# Patient Record
Sex: FEMALE | Race: WHITE | URBAN - METROPOLITAN AREA
[De-identification: names, ages, dates, MRNs, and addresses within clinical notes are randomized per-mention and may not be internally consistent; named-entity substitution may affect disease eponyms.]

---

## 2021-01-14 ENCOUNTER — NEW PATIENT (OUTPATIENT)
Dept: URBAN - METROPOLITAN AREA CLINIC 51 | Facility: CLINIC | Age: 76
End: 2021-01-14
Payer: MEDICARE

## 2021-01-14 DIAGNOSIS — H43.313 VITREOUS MEMBRANES AND STRANDS, BILATERAL: ICD-10-CM

## 2021-01-14 DIAGNOSIS — H53.022 REFRACTIVE AMBLYOPIA, LEFT EYE: ICD-10-CM

## 2021-01-14 PROCEDURE — 92004 COMPRE OPH EXAM NEW PT 1/>: CPT | Performed by: OPTOMETRIST

## 2021-01-14 PROCEDURE — 92134 CPTRZ OPH DX IMG PST SGM RTA: CPT | Performed by: OPTOMETRIST

## 2021-01-14 ASSESSMENT — INTRAOCULAR PRESSURE
OS: 14
OD: 15

## 2021-01-14 ASSESSMENT — VISUAL ACUITY
OS: 20/HM
OD: 20/20

## 2021-01-14 ASSESSMENT — KERATOMETRY: OD: 43.52

## 2021-01-21 ENCOUNTER — ADULT PHYSICAL (OUTPATIENT)
Dept: URBAN - METROPOLITAN AREA CLINIC 51 | Facility: CLINIC | Age: 76
End: 2021-01-21
Payer: MEDICARE

## 2021-01-21 DIAGNOSIS — Z01.818 ENCOUNTER FOR OTHER PREPROCEDURAL EXAMINATION: Primary | ICD-10-CM

## 2021-01-21 DIAGNOSIS — H25.813 COMBINED FORMS OF AGE-RELATED CATARACT, BILATERAL: Primary | ICD-10-CM

## 2021-01-21 PROCEDURE — 92025 CPTRIZED CORNEAL TOPOGRAPHY: CPT | Performed by: OPHTHALMOLOGY

## 2021-01-21 PROCEDURE — 99203 OFFICE O/P NEW LOW 30 MIN: CPT | Performed by: PHYSICIAN ASSISTANT

## 2021-01-27 ENCOUNTER — NEW PATIENT (OUTPATIENT)
Dept: URBAN - METROPOLITAN AREA CLINIC 44 | Facility: CLINIC | Age: 76
End: 2021-01-27
Payer: MEDICARE

## 2021-01-27 DIAGNOSIS — H25.11 AGE-RELATED NUCLEAR CATARACT, RIGHT EYE: ICD-10-CM

## 2021-01-27 DIAGNOSIS — H52.221 REGULAR ASTIGMATISM, RIGHT EYE: ICD-10-CM

## 2021-01-27 PROCEDURE — 99204 OFFICE O/P NEW MOD 45 MIN: CPT | Performed by: OPHTHALMOLOGY

## 2021-02-03 ENCOUNTER — SURGERY (OUTPATIENT)
Dept: URBAN - METROPOLITAN AREA SURGERY 19 | Facility: SURGERY | Age: 76
End: 2021-02-03
Payer: MEDICARE

## 2021-02-04 ENCOUNTER — POST OP (OUTPATIENT)
Dept: URBAN - METROPOLITAN AREA CLINIC 51 | Facility: CLINIC | Age: 76
End: 2021-02-04
Payer: MEDICARE

## 2021-02-04 DIAGNOSIS — Z96.1 PRESENCE OF INTRAOCULAR LENS: Primary | ICD-10-CM

## 2021-02-04 PROCEDURE — 99024 POSTOP FOLLOW-UP VISIT: CPT | Performed by: OPTOMETRIST

## 2021-02-04 ASSESSMENT — INTRAOCULAR PRESSURE: OD: 22

## 2021-04-07 ENCOUNTER — POST-OPERATIVE VISIT (OUTPATIENT)
Dept: URBAN - METROPOLITAN AREA CLINIC 44 | Facility: CLINIC | Age: 76
End: 2021-04-07
Payer: MEDICARE

## 2021-04-07 DIAGNOSIS — Z48.810 ENCOUNTER FOR SURGICAL AFTERCARE FOLLOWING SURGERY ON A SENSE ORGAN: Primary | ICD-10-CM

## 2021-04-07 PROCEDURE — 99024 POSTOP FOLLOW-UP VISIT: CPT | Performed by: OPTOMETRIST

## 2021-04-07 ASSESSMENT — INTRAOCULAR PRESSURE
OD: 17
OS: 17

## 2021-04-07 NOTE — IMPRESSION/PLAN
Impression: S/P Cataract Extraction by phacoemulsification with IOL placement; Astigmatic Keratotomy OD - 63 Days. Encounter for surgical aftercare following surgery on a sense organ  Z48.810. Plan: Patient noticing brightness and an after image OD x 10 days. Retina attached 360. Mild PCH OD not causing symptoms. Unclear what is causing symptoms, but assured eye is healing well. Will follow up in 1 month.

## 2023-10-10 ENCOUNTER — TELEPHONE (OUTPATIENT)
Age: 78
End: 2023-10-10

## 2023-10-10 NOTE — TELEPHONE ENCOUNTER
MESSAGE IS FOR NANCY: Patient calling in wanting to speak with Amandeep Kay because she got a text message asking for her to call the office to go over some billing questions. Patient was made aware Georginatanael Rahul is no longer in office today and that she would be back tomorrow.

## 2023-11-01 ENCOUNTER — APPOINTMENT (EMERGENCY)
Dept: RADIOLOGY | Facility: HOSPITAL | Age: 78
End: 2023-11-01
Payer: MEDICARE

## 2023-11-01 ENCOUNTER — HOSPITAL ENCOUNTER (EMERGENCY)
Facility: HOSPITAL | Age: 78
Discharge: HOME | End: 2023-11-01
Attending: SURGERY
Payer: MEDICARE

## 2023-11-01 ENCOUNTER — APPOINTMENT (EMERGENCY)
Dept: CT IMAGING | Facility: HOSPITAL | Age: 78
End: 2023-11-01
Payer: MEDICARE

## 2023-11-01 ENCOUNTER — OFFICE VISIT (OUTPATIENT)
Dept: URGENT CARE | Facility: MEDICAL CENTER | Age: 78
End: 2023-11-01
Payer: MEDICARE

## 2023-11-01 VITALS
WEIGHT: 125 LBS | OXYGEN SATURATION: 98 % | TEMPERATURE: 98 F | SYSTOLIC BLOOD PRESSURE: 116 MMHG | HEART RATE: 70 BPM | DIASTOLIC BLOOD PRESSURE: 72 MMHG | HEIGHT: 64 IN | RESPIRATION RATE: 20 BRPM | BODY MASS INDEX: 21.34 KG/M2

## 2023-11-01 VITALS
BODY MASS INDEX: 21.95 KG/M2 | OXYGEN SATURATION: 99 % | DIASTOLIC BLOOD PRESSURE: 70 MMHG | TEMPERATURE: 97.6 F | WEIGHT: 127.87 LBS | SYSTOLIC BLOOD PRESSURE: 158 MMHG | RESPIRATION RATE: 18 BRPM | HEART RATE: 63 BPM

## 2023-11-01 DIAGNOSIS — S82.045A CLOSED NONDISPLACED COMMINUTED FRACTURE OF LEFT PATELLA, INITIAL ENCOUNTER: Primary | ICD-10-CM

## 2023-11-01 DIAGNOSIS — S02.2XXA CLOSED FRACTURE OF NASAL BONE, INITIAL ENCOUNTER: ICD-10-CM

## 2023-11-01 DIAGNOSIS — S00.81XA ABRASION OF FACE, INITIAL ENCOUNTER: Primary | ICD-10-CM
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
## 2023-11-01 LAB
BASE EXCESS BLDA CALC-SCNC: 2 MMOL/L (ref -2–3)
CA-I BLD-SCNC: 1.17 MMOL/L (ref 1.12–1.32)
GLUCOSE SERPL-MCNC: 98 MG/DL (ref 65–140)
HCO3 BLDA-SCNC: 25.6 MMOL/L (ref 24–30)
HCT VFR BLD CALC: 35 % (ref 34.8–46.1)
HGB BLDA-MCNC: 11.9 G/DL (ref 11.5–15.4)
PCO2 BLD: 27 MMOL/L (ref 21–32)
PCO2 BLD: 37 MM HG (ref 42–50)
PH BLD: 7.45 [PH] (ref 7.3–7.4)
PO2 BLD: 15 MM HG (ref 35–45)
POTASSIUM BLD-SCNC: 4.4 MMOL/L (ref 3.5–5.3)
SAO2 % BLD FROM PO2: 22 % (ref 60–85)
SODIUM BLD-SCNC: 138 MMOL/L (ref 136–145)
SPECIMEN SOURCE: ABNORMAL

## 2023-11-01 PROCEDURE — 99284 EMERGENCY DEPT VISIT MOD MDM: CPT

## 2023-11-01 PROCEDURE — 76705 ECHO EXAM OF ABDOMEN: CPT | Performed by: SURGERY

## 2023-11-01 PROCEDURE — G0463 HOSPITAL OUTPT CLINIC VISIT: HCPCS | Performed by: NURSE PRACTITIONER

## 2023-11-01 PROCEDURE — 99213 OFFICE O/P EST LOW 20 MIN: CPT | Performed by: NURSE PRACTITIONER

## 2023-11-01 PROCEDURE — 70486 CT MAXILLOFACIAL W/O DYE: CPT

## 2023-11-01 PROCEDURE — 85014 HEMATOCRIT: CPT

## 2023-11-01 PROCEDURE — 93308 TTE F-UP OR LMTD: CPT | Performed by: SURGERY

## 2023-11-01 PROCEDURE — 84295 ASSAY OF SERUM SODIUM: CPT

## 2023-11-01 PROCEDURE — 99284 EMERGENCY DEPT VISIT MOD MDM: CPT | Performed by: STUDENT IN AN ORGANIZED HEALTH CARE EDUCATION/TRAINING PROGRAM

## 2023-11-01 PROCEDURE — 71045 X-RAY EXAM CHEST 1 VIEW: CPT

## 2023-11-01 PROCEDURE — 84132 ASSAY OF SERUM POTASSIUM: CPT

## 2023-11-01 PROCEDURE — 27520 TREAT KNEECAP FRACTURE: CPT | Performed by: STUDENT IN AN ORGANIZED HEALTH CARE EDUCATION/TRAINING PROGRAM

## 2023-11-01 PROCEDURE — 73560 X-RAY EXAM OF KNEE 1 OR 2: CPT

## 2023-11-01 PROCEDURE — 99284 EMERGENCY DEPT VISIT MOD MDM: CPT | Performed by: SURGERY

## 2023-11-01 PROCEDURE — 82803 BLOOD GASES ANY COMBINATION: CPT

## 2023-11-01 PROCEDURE — 82330 ASSAY OF CALCIUM: CPT

## 2023-11-01 PROCEDURE — 70450 CT HEAD/BRAIN W/O DYE: CPT

## 2023-11-01 PROCEDURE — 82947 ASSAY GLUCOSE BLOOD QUANT: CPT

## 2023-11-01 RX ORDER — THYROID 60 MG/1
60 TABLET ORAL DAILY
COMMUNITY

## 2023-11-01 RX ORDER — FEXOFENADINE HCL 180 MG/1
180 TABLET ORAL DAILY
COMMUNITY

## 2023-11-01 RX ORDER — CARVEDILOL 3.12 MG/1
3.12 TABLET ORAL 2 TIMES DAILY WITH MEALS
COMMUNITY

## 2023-11-01 RX ORDER — DILTIAZEM HYDROCHLORIDE 240 MG/1
240 CAPSULE, EXTENDED RELEASE ORAL DAILY
COMMUNITY

## 2023-11-01 NOTE — ASSESSMENT & PLAN NOTE
- Left patella fracture, present on admission.  - Status post fall on 11/1. - Appreciate Orthopedic surgery evaluation, recommendations and interventions as noted. - Maintain WBAT LLE in knee immobilizer  - Monitor left lower extremity neurovascular exam.  - Continue multimodal analgesic regimen.  - Pt ambulated in ED with a walker, which she has at home and her son plans on staying with her   - Outpatient follow up with Orthopedic surgery for re-evaluation.

## 2023-11-01 NOTE — PROGRESS NOTES
[facility]        NAME: Lesly Goldstein is a 68 y.o. female  : 1945    MRN: [id number]  DATE: 2023  TIME: 1:19 PM    Assessment and Plan   Abrasion of face, initial encounter [S00.81XA]  1. Abrasion of face, initial encounter  Transfer to other facility        Recommended that patient go to the ER for further evaluation due to fall with facial injury on Xarelto. Her son will take her to [facility]. Patient Instructions       Follow up with PCP in 3-5 days. Proceed to  ER if symptoms worsen. Chief Complaint     Chief Complaint   Patient presents with    Fall    Knee Pain    Facial Injury     Patient states today she fell while walking her dog this morning around 11 am; fell forward landing on face/nose,left knee; abrasions noted to face and left knee     Denies LOC but states she does take blood thinners daily       TDAP UTD          History of Present Illness       Patient is a 68year old female accompanied by son for evaluation of a trip and fall. She was walking her small dog this morning at 1030 [place] [facility] [facility] [place] She landed on her left knee and her face. Denies LOC. She lives at [facility] and was advised by the nurse to come to urgent care. She is on Xarelto from hip surgery 5 months ago. Denies headache or facial pain. No OTC medications prior to arrival.     Fall  Pertinent negatives include no fever, headaches or numbness. Knee Pain   Pertinent negatives include no numbness. Facial Injury   Pertinent negatives include no headaches, numbness or weakness. Review of Systems   Review of Systems   Constitutional:  Negative for activity change, chills and fever. HENT:  Positive for facial swelling. Eyes:  Positive for photophobia and visual disturbance. Respiratory:  Negative for cough. Musculoskeletal:  Positive for arthralgias, gait problem and joint swelling. Skin:  Positive for color change and wound.    Neurological: Negative for weakness, numbness and headaches. Current Medications       Current Outpatient Medications:     carvedilol (COREG) 3.125 mg tablet, Take 3.125 mg by mouth 2 (two) times a day with meals, Disp: , Rfl:     diltiazem (DILACOR XR) 240 MG 24 hr capsule, Take 240 mg by mouth daily, Disp: , Rfl:     fexofenadine (ALLEGRA) 180 MG tablet, Take 180 mg by mouth daily, Disp: , Rfl:     rivaroxaban (Xarelto) 20 mg tablet, Take 20 mg by mouth, Disp: , Rfl:     thyroid (ARMOUR) 60 MG tablet, Take 60 mg by mouth daily, Disp: , Rfl:     Current Allergies     Allergies as of 11/01/2023 - Reviewed 11/01/2023   Allergen Reaction Noted    Other Other (See Comments) 11/01/2023            The following portions of the patient's history were reviewed and updated as appropriate: allergies, current medications, past family history, past medical history, past social history, past surgical history and problem list.     Past Medical History:   Diagnosis Date    A-fib ([address])     History of loop recorder     Multiple falls     Pacemaker     Thyroid disease        Past Surgical History:   Procedure Laterality Date    APPENDECTOMY      BILATERAL HIP ARTHROSCOPY Right     CARDIAC LOOP RECORDER      CARDIAC PACEMAKER PLACEMENT         No family history on file. Medications have been verified. Objective   /72   Pulse 70   Temp 98 °F (36.7 °C) (Temporal)   Resp 20   Ht 5' 4" (1.626 m)   Wt 56.7 kg (125 lb)   SpO2 98%   BMI 21.46 kg/m²        Physical Exam     Physical Exam  Vitals reviewed. Constitutional:       General: She is awake. She is not in acute distress. Appearance: Normal appearance. She is normal weight. Cardiovascular:      Rate and Rhythm: Normal rate. Pulmonary:      Effort: Pulmonary effort is normal.   Musculoskeletal:      Left knee: Erythema, ecchymosis and bony tenderness present. Decreased range of motion.         Legs:    Skin:     General: Skin is warm and moist. Comments: Facial abrasions on the right side of the face, nose, and upper lip. Neurological:      General: No focal deficit present. Mental Status: She is alert and oriented to person, place, and time. Psychiatric:         Behavior: Behavior is cooperative.

## 2023-11-01 NOTE — DISCHARGE INSTR - AVS FIRST PAGE
Discharge Instructions - Orthopedics  Moe Shea 68 y.o. female MRN: [id number]  Unit/Bed#: ED-03    Weight Bearing Status:                                           Weight bearing as tolerated in knee immobilizer    Pain:  Continue analgesics as directed    Dressing Instructions:   Please keep clean, dry and intact until follow up     Appt Instructions: If you do not have your appointment, please call the clinic at [phone] in two weeks with Dr. Oli Gaston. Otherwise follow up as scheduled. Contact the office sooner if you experience any increased numbness/tingling in the extremities. Nasal bone fractures, Sinus Precautions:   - Maintain for 4 weeks  - No nose blowing and try to sneeze with mouth open. - Avoid putting pressure on sinus area  - Avoid strenuous activity/straining.  - Use over-the-counter Afrin twice daily 2 sprays/nostril 3 days maximum as needed, over-the-counter decongestant (e.g. Sudafed) or Antihistamine (e.g. Claritin-D) as needed, and saline nasal spray as needed.    - Apply ice 20 mins on/ off   - Follow up with OMFS for bilateral nasal bone fractures

## 2023-11-01 NOTE — CONSULTS
Orthopedics   Jessee Robertson 68 y.o. female MRN: [id number]  Unit/Bed#: ED-03      Chief Complaint:   Left knee pain     HPI:   68 y.o. female community ambulator, sometimes ambulates with a cane when outside status post mechanical fall while out walking her dog this morning and stepping up onto a curb complaining of left knee pain. She also fell onto her face, sustaining facial injures/fractures. She has been found to have a left sided patella fracture, for which orthopedics has been engaged. At time of consult she reports a recent left sided hip hemiarthroplasty several months ago s/p a fall in [place]. She otherwise notes that she has isolated left knee pain. She has no other complaints. No upper extremity symptoms, and no other lower extremity symptoms. No numbness/tingling. She has a small abrasion over the left knee. Review Of Systems:   Skin: as per HPI  Neuro: See HPI  Musculoskeletal: See HPI  14 point review of systems negative except as stated above     Past Medical History:   Past Medical History:   Diagnosis Date    A-fib ([address] )     History of loop recorder     Multiple falls     Pacemaker     Thyroid disease        Past Surgical History:   Past Surgical History:   Procedure Laterality Date    APPENDECTOMY      BILATERAL HIP ARTHROSCOPY Right     CARDIAC LOOP RECORDER      CARDIAC PACEMAKER PLACEMENT         Family History:  Family history reviewed and non-contributory  No family history on file. Social History:  Social History     Socioeconomic History    Marital status:       Spouse name: Not on file    Number of children: Not on file    Years of education: Not on file    Highest education level: Not on file   Occupational History    Not on file   Tobacco Use    Smoking status: Not on file    Smokeless tobacco: Not on file   Substance and Sexual Activity    Alcohol use: Not on file    Drug use: Not on file    Sexual activity: Not on file   Other Topics Concern    Not on file   Social History Narrative    Not on file     Social Determinants of Health     Financial Resource Strain: Not on file   Food Insecurity: Not on file   Transportation Needs: Not on file   Physical Activity: Not on file   Stress: Not on file   Social Connections: Not on file   Intimate Partner Violence: Not on file   Housing Stability: Not on file       Allergies: Allergies   Allergen Reactions    Other Other (See Comments)     Any "steroids" including prednisone and eye steroids; causes muscle weakness, joint pain            Labs:  0   Lab Value Date/Time    HCT 35 11/01/2023 1321    HGB 11.9 11/01/2023 1321       Meds:  No current facility-administered medications for this encounter. Current Outpatient Medications:     carvedilol (COREG) 3.125 mg tablet, Take 3.125 mg by mouth 2 (two) times a day with meals, Disp: , Rfl:     diltiazem (DILACOR XR) 240 MG 24 hr capsule, Take 240 mg by mouth daily, Disp: , Rfl:     fexofenadine (ALLEGRA) 180 MG tablet, Take 180 mg by mouth daily, Disp: , Rfl:     rivaroxaban (Xarelto) 20 mg tablet, Take 20 mg by mouth, Disp: , Rfl:     thyroid (ARMOUR) 60 MG tablet, Take 60 mg by mouth daily, Disp: , Rfl:     Blood Culture:   No results found for: "BLOODCX"    Wound Culture:   No results found for: "WOUNDCULT"    Ins and Outs:  No intake/output data recorded. Physical Exam:   /71   Pulse 60   Temp 97.6 °F (36.4 °C) (Oral)   Resp 16   Wt 58 kg (127 lb 13.9 oz)   SpO2 99%   BMI 21.95 kg/m²   Gen: No acute distress, resting comfortably in bed  HEENT: Eyes clear, moist mucus membranes, hearing intact  Respiratory: No audible wheezing or stridor  Cardiovascular: Well Perfused peripherally, 2+ distal pulse  Abdomen: nondistended, no peritoneal signs  Musculoskeletal: left lower extremity  Skin: small abrasion over the left knee. Tender to palpation over entire knee   No palpable gap. No effusion present. Able to perform straight leg raise without issue.    Stable to varus/valgus testing. Sensation intact L3-S1  Intact ankle dorsi/plantar flexion, EHL/FHL  2+ DP/PT pulse  Musculature is soft and compressible, no pain with passive stretch  Leg lengths equal   General MSK  No pain in the bilateral upper extremites over clavicles, shoulders, upper arms, elbows, forearms or wrists. ROM full. Sensation intact. No pain over hips, femurs, right knee, bilateral lower extremities, ankles or feet. ROM full in the right. Sensation intact in RLE. Radiology:   I personally reviewed the films. Imaging reviewed and discussed with Dr. Андрей Beckman. XRAYs left knee: non displaced patella fracture.    _*_*_*_*_*_*_*_*_*_*_*_*_*_*_*_*_*_*_*_*_*_*_*_*_*_*_*_*_*_*_*_*_*_*_*_*_*_*_*_*_*    Assessment:  68 y.o. female status post fall with left patella fracture. Plan:   WBAT to the left lower extremity in KI. No immediate orthopedic intervention indicated. Analgesics for pain per primary team.   DVT ppx per primary team.   Body mass index is 21.95 kg/m². Dispo: Ortho signing off  Case reviewed with Dr. Андрей Beckman. Should follow up in office in 2 weeks.      Kristine Bonilla PA-C

## 2023-11-01 NOTE — ASSESSMENT & PLAN NOTE
- Bilateral nondisplaced nasal bone fractures  - Abrasions to nasal bridge  - Follow up with OMFS  - Augmentin x 1 week  - Sinus precautions, no blowing nose

## 2023-11-01 NOTE — H&P
[address]  H&P  Name: Elsa Cast 68 y.o. female I MRN: [id number]  Unit/Bed#: ED-03 I Date of Admission: 11/1/2023   Date of Service: 11/1/2023 I Hospital Day: 0      Assessment/Plan   Nasal bones, closed fracture  Assessment & Plan  - Bilateral nondisplaced nasal bone fractures  - Abrasions to nasal bridge  - Follow up with OMFS  - Augmentin x 1 week  - Sinus precautions, no blowing nose    Fall  Assessment & Plan  - Mechanical fall while walking her dog  - Head strike on Xarelto, no LOC  - Injuries as listed    * Closed nondisplaced comminuted fracture of left patella  Assessment & Plan  - Left patella fracture, present on admission.  - Status post fall on 11/1. - Appreciate Orthopedic surgery evaluation, recommendations and interventions as noted. - Maintain WBAT LLE in knee immobilizer  - Monitor left lower extremity neurovascular exam.  - Continue multimodal analgesic regimen.  - Pt ambulated in ED with a walker, which she has at home and her son plans on staying with her   - Outpatient follow up with Orthopedic surgery for re-evaluation. Cervical Collar Clearance: On exam, the patient had no midline point tenderness or paresthesias/numbness/weakness in the extremities. The patient had full range of motion (was then able to flex, extend, and rotate head laterally) without pain. There were no distracting injuries and the patient was not intoxicated. The patient's cervical spine was cleared clinically. Daisha Ryder PA-C  11/1/2023 4:05 PM         Trauma Alert: Level B   Model of Arrival: Self    Trauma Team: Attending To and JENNIFER Ryder  Consultants:     Orthopedics: routine consult; Epic consult order placed; History of Present Illness     Chief Complaint: Fall  Mechanism:Fall     HPI:    Elsa Cast is a 68 y.o. female who presents after a fall. Patient reports she was walking her dog when she had a mechanical fall.  She landed on her left knee, then hit her face on the pavement. She denies loss of consciousness and reports she takes Xarelto daily. She reports she has severe left knee pain. Review of Systems   HENT:  Negative for facial swelling and nosebleeds. Eyes:  Negative for photophobia. Respiratory:  Negative for shortness of breath. Cardiovascular:  Negative for chest pain. Gastrointestinal:  Negative for abdominal pain and nausea. Musculoskeletal:  Positive for arthralgias and myalgias. Negative for back pain and neck pain. Skin:  Positive for wound. Neurological:  Negative for dizziness, syncope, weakness, light-headedness, numbness and headaches. Psychiatric/Behavioral:  Negative for confusion. All other systems reviewed and are negative. 12-point, complete review of systems was reviewed and negative except as stated above. Historical Information     Past Medical History:   Diagnosis Date    A-fib ([address])     History of loop recorder     Multiple falls     Pacemaker     Thyroid disease      Past Surgical History:   Procedure Laterality Date    APPENDECTOMY      BILATERAL HIP ARTHROSCOPY Right     CARDIAC LOOP RECORDER      CARDIAC PACEMAKER PLACEMENT               There is no immunization history on file for this patient. Last Tetanus: unknown  Family History: Non-contributory    1. Before the illness or injury that brought you to the Emergency, did you need someone to help you on a regular basis? 0=No   2. Since the illness or injury that brought you to the Emergency, have you needed more help than usual to take care of yourself? 1=Yes   3. Have you been hospitalized for one or more nights during the past 6 months (excluding a stay in the Emergency Department)? 0=No   4. In general, do you see well? 0=Yes   5. In general, do you have serious problems with your memory? 0=No   6. Do you take more than three different medications everyday?  1=Yes   TOTAL   2     Did you order a geriatric consult if the score was 2 or greater?: yes     Meds/Allergies   all current active meds have been reviewed  Allergies have not been reviewed; Allergies   Allergen Reactions    Other Other (See Comments)     Any "steroids" including prednisone and eye steroids; causes muscle weakness, joint pain        Objective   Initial Vitals:   Temperature: 97.6 °F (36.4 °C) (11/01/23 1315)  Pulse: 65 (11/01/23 1315)  Respirations: 18 (11/01/23 1315)  Blood Pressure: 153/94 (11/01/23 1315)    Primary Survey:   Airway:        Status: patent;        Pre-hospital Interventions: none        Hospital Interventions: none  Breathing:        Pre-hospital Interventions: none       Effort: normal       Right breath sounds: normal       Left breath sounds: normal  Circulation:        Rhythm: regular       Rate: regular   Right Pulses Left Pulses    R radial: 2+    R pedal: 2+     L radial: 2+    L pedal: 2+       Disability:        GCS: Eye: 4; Verbal: 5 Motor: 6 Total: 15       Right Pupil: round;  reactive         Left Pupil:  round;  reactive      R Motor Strength L Motor Strength    R : 5/5  R dorsiflex: 5/5  R plantarflex: 5/5 L : 5/5  L dorsiflex: 5/5  L plantarflex: 5/5        Sensory:  No sensory deficit  Exposure:       Completed: Yes      Secondary Survey:  Physical Exam  Vitals reviewed. Constitutional:       General: She is not in acute distress. Appearance: Normal appearance. HENT:      Head: Normocephalic. Comments: Nasal bridge and chin abrasions     Right Ear: External ear normal.      Left Ear: External ear normal.      Nose: Nose normal.      Mouth/Throat:      Mouth: Mucous membranes are moist.      Pharynx: Oropharynx is clear. Eyes:      Extraocular Movements: Extraocular movements intact. Conjunctiva/sclera: Conjunctivae normal.      Pupils: Pupils are equal, round, and reactive to light. Cardiovascular:      Rate and Rhythm: Normal rate. Rhythm irregular. Pulses: Normal pulses.       Heart sounds: Normal heart sounds. Pulmonary:      Effort: Pulmonary effort is normal. No respiratory distress. Breath sounds: Normal breath sounds. Chest:      Chest wall: No tenderness. Abdominal:      General: Abdomen is flat. Bowel sounds are normal. There is no distension. Palpations: Abdomen is soft. There is no mass. Tenderness: There is no abdominal tenderness. There is no guarding or rebound. Hernia: No hernia is present. Musculoskeletal:         General: Swelling and tenderness present. No deformity or signs of injury. Cervical back: Normal range of motion and neck supple. No rigidity or tenderness. Comments: Limited ROM of left knee due to pain and swelling and overlying ecchymosis   Skin:     General: Skin is warm and dry. Capillary Refill: Capillary refill takes less than 2 seconds. Findings: Bruising present. No lesion. Neurological:      General: No focal deficit present. Mental Status: She is alert and oriented to person, place, and time. Mental status is at baseline. Sensory: No sensory deficit. Motor: No weakness. Psychiatric:         Mood and Affect: Mood normal.         Behavior: Behavior normal.         Invasive Devices       Peripheral Intravenous Line  Duration             Peripheral IV 11/01/23 Left Antecubital <1 day                  Lab Results: I have personally reviewed all pertinent laboratory/test results from 11/01/23, including the preceding 24 hours. Recent Labs     11/01/23  1321   HGB 11.9   HCT 35   CO2 27   CAIONIZED 1.17       Imaging Results: I have personally reviewed pertinent images saved in PACS. CT scan findings (and other pertinent positive findings on images) were discussed with radiology.  My interpretation of the images/reports are as follows:  Chest Xray(s): negative for acute findings   FAST exam(s): negative for acute findings   CT Scan(s): positive for acute findings: nasal bone fx   Additional Xray(s): positive for acute findings: L patella fx     Other Studies:   XR Trauma multiple ([facility] trauma bay ONLY)   Final Result by Jessie Danile MD (11/01 1349)      No acute cardiopulmonary disease within limitations of supine imaging. Comminuted left patellar fracture. Workstation performed: [id number]         XR chest 1 view   Final Result by Jessie Daniel MD (11/01 1349)      No acute cardiopulmonary disease within limitations of supine imaging. Comminuted left patellar fracture. Workstation performed: [id number]         XR knee 1 or 2 vw left   Final Result by Jessie Daniel MD (11/01 1349)      No acute cardiopulmonary disease within limitations of supine imaging. Comminuted left patellar fracture. Workstation performed: [id number]         TRAUMA - CT head wo contrast   Final Result by Jessie Daniel MD (11/01 1344)      No intracranial hemorrhage or calvarial fracture. The facial bones are reported separately. Workstation performed: [id number]         TRAUMA - CT facial bones wo contrast   Final Result by Jessie Daniel MD (11/01 1348)      Acute nondisplaced nasal bone fractures. The trauma team was notified just before the time of this examination.                Workstation performed: [id number]               Code Status: No Order  Advance Directive and Living Will:      Power of :    POLST:    I have spent 30 minutes with Patient and family today in which greater than 50% of this time was spent in counseling/coordination of care regarding Diagnostic results, Prognosis, Risks and benefits of tx options, Instructions for management, Patient and family education, Importance of tx compliance, Risk factor reductions, Impressions, Counseling / Coordination of care, Documenting in the medical record, Reviewing / ordering tests, medicine, procedures  , Obtaining or reviewing history  , and Communicating with other healthcare professionals .

## 2023-11-01 NOTE — PROCEDURES
POC FAST US    Date/Time: 11/1/2023 4:05 PM    Performed by: Peter Griffin PA-C  Authorized by: Peter Griffin PA-C    Patient location:  Trauma  Procedure details:     Exam Type:  Diagnostic    Indications: blunt abdominal trauma and blunt chest trauma      Assess for:  Intra-abdominal fluid and pericardial effusion    Technique: FAST      Views obtained:  Heart - Pericardial sac, LUQ - Splenorenal space, Suprapubic - Pouch of Severiano and RUQ - Crane's Pouch    Image quality: diagnostic      Image availability:  Images available in PACS and video obtained  FAST Findings:     RUQ (Hepatorenal) free fluid: absent      LUQ (Splenorenal) free fluid: absent      Suprapubic free fluid: absent      Cardiac wall motion: identified      Pericardial effusion: absent    Interpretation:     Impressions: negative

## 2023-11-02 RX ORDER — AMOXICILLIN AND CLAVULANATE POTASSIUM 875; 125 MG/1; MG/1
1 TABLET, FILM COATED ORAL EVERY 12 HOURS SCHEDULED
Qty: 14 TABLET | Refills: 0 | Status: SHIPPED | OUTPATIENT
Start: 2023-11-02 | End: 2023-11-09

## 2023-11-02 RX ORDER — NAPROXEN 375 MG/1
375 TABLET, DELAYED RELEASE ORAL 2 TIMES DAILY WITH MEALS
Qty: 60 TABLET | Refills: 0 | Status: SHIPPED | OUTPATIENT
Start: 2023-11-02

## 2023-11-02 RX ORDER — METHOCARBAMOL 500 MG/1
500 TABLET, FILM COATED ORAL 4 TIMES DAILY
Qty: 65 TABLET | Refills: 0 | Status: SHIPPED | OUTPATIENT
Start: 2023-11-02

## 2023-11-02 RX ORDER — ACETAMINOPHEN 325 MG/1
650 TABLET ORAL EVERY 6 HOURS PRN
Refills: 0
Start: 2023-11-02

## 2023-11-02 NOTE — CASE MANAGEMENT
Case Management ED Progress Note    Patient name Manual Coon  Location ED-03/ED-03 MRN [id number]  : 1945 Date 2023        OBJECTIVE:    Chief Complaint: Closed nondisplaced comminuted fracture of left patella   Patient Class: Emergency  Preferred Pharmacy:   [id number] [address]  [address] [facility] [address]  Phone: [phone] Fax: [phone]    Primary Care Provider: No primary care provider on file. Primary Insurance: MEDICARE  Secondary Insurance: [place]    ED Progress Note:  CM responded to trauma alert. Patient was brought into ED by a family member, brought to trauma bay for eval. Patient alert and responsive to medical team. Trauma AP aware of above. No current identified CM needs. CM will follow and update screening, assessment, and discharge planning as appropriate.

## 2023-11-13 ENCOUNTER — APPOINTMENT (OUTPATIENT)
Dept: RADIOLOGY | Facility: AMBULARY SURGERY CENTER | Age: 78
End: 2023-11-13
Attending: STUDENT IN AN ORGANIZED HEALTH CARE EDUCATION/TRAINING PROGRAM
Payer: MEDICARE

## 2023-11-13 ENCOUNTER — OFFICE VISIT (OUTPATIENT)
Dept: OBGYN CLINIC | Facility: CLINIC | Age: 78
End: 2023-11-13
Payer: MEDICARE

## 2023-11-13 VITALS
HEIGHT: 64 IN | BODY MASS INDEX: 21.83 KG/M2 | SYSTOLIC BLOOD PRESSURE: 160 MMHG | WEIGHT: 127.87 LBS | HEART RATE: 73 BPM | DIASTOLIC BLOOD PRESSURE: 74 MMHG

## 2023-11-13 DIAGNOSIS — S82.045A CLOSED NONDISPLACED COMMINUTED FRACTURE OF LEFT PATELLA, INITIAL ENCOUNTER: Primary | ICD-10-CM

## 2023-11-13 DIAGNOSIS — S82.045A CLOSED NONDISPLACED COMMINUTED FRACTURE OF LEFT PATELLA, INITIAL ENCOUNTER: ICD-10-CM

## 2023-11-13 PROCEDURE — 73560 X-RAY EXAM OF KNEE 1 OR 2: CPT

## 2023-11-13 PROCEDURE — 99214 OFFICE O/P EST MOD 30 MIN: CPT | Performed by: STUDENT IN AN ORGANIZED HEALTH CARE EDUCATION/TRAINING PROGRAM

## 2023-11-13 PROCEDURE — 99204 OFFICE O/P NEW MOD 45 MIN: CPT | Performed by: STUDENT IN AN ORGANIZED HEALTH CARE EDUCATION/TRAINING PROGRAM

## 2023-11-13 NOTE — PROGRESS NOTES
Orthopaedics Office Visit -new patient Visit    ASSESSMENT/PLAN:    Assessment:   Left patella nondisplaced horizontal fracture sustained 11/1/2023    Plan:   Radiograph reviewed with patient displaying a horizontally oriented nondisplaced patella fracture which has not increased in displacement from her original emergency department views  Weight bear as tolerated with the hinged knee brace locked in extension. T-rom brace provided today and set to 0 to 50 degrees. Start physical therapy   Protocol for patella fracture provided to PT and patient  Patient will begin at 0 to 50 degrees range of motion arc. Patient should increase by 10 degrees each week with the goal to be at least at 90 degrees x 8 weeks from the date of injury. Patient can ambulate weightbearing as tolerated with the knee locked in extension. Can undergo passive range of motion with physical therapy within the allowed range of motion arc  Patient is on Xarelto at baseline for A-fib. Continue this for DVT prophylaxis and precautions  Continue with Tylenol as needed for pain control  Follow up in 6 weeks    To Do Next Visit:  Repeat x-rays of left knee    _____________________________________________________  CHIEF COMPLAINT:  Chief Complaint   Patient presents with    Left Knee - Fracture         SUBJECTIVE:  Shawna Grajeda is a 68 y.o. female who presents for initial evaluation of left knee s/p fall 11/1/2023 with history of left total hip arthroplasty 7 months ago in [place]. She states she has fallen severe times due to medication for treatment of Afib, Dilitiazem. Today she complains of decreasing left anterior knee pain. She has been complaint with left LE immobilizer brace. She has been weight bearing with use of SPC. Patient denies any numbness or paresthesias in the left lower extremity. Denies any previous knee injury.     PAST MEDICAL HISTORY:  Past Medical History:   Diagnosis Date    A-fib [facility])     History of loop recorder Multiple falls     Pacemaker     Thyroid disease        PAST SURGICAL HISTORY:  Past Surgical History:   Procedure Laterality Date    APPENDECTOMY      BILATERAL HIP ARTHROSCOPY Right     CARDIAC LOOP RECORDER      CARDIAC PACEMAKER PLACEMENT         FAMILY HISTORY:  History reviewed. No pertinent family history. SOCIAL HISTORY:       MEDICATIONS:    Current Outpatient Medications:     acetaminophen (TYLENOL) 325 mg tablet, Take 2 tablets (650 mg total) by mouth every 6 (six) hours as needed for mild pain, Disp: , Rfl: 0    carvedilol (COREG) 3.125 mg tablet, Take 3.125 mg by mouth 2 (two) times a day with meals, Disp: , Rfl:     diltiazem (DILACOR XR) 240 MG 24 hr capsule, Take 240 mg by mouth daily, Disp: , Rfl:     fexofenadine (ALLEGRA) 180 MG tablet, Take 180 mg by mouth daily, Disp: , Rfl:     methocarbamol (ROBAXIN) 500 mg tablet, Take 1 tablet (500 mg total) by mouth 4 (four) times a day, Disp: 65 tablet, Rfl: 0    naproxen (EC NAPROSYN) 375 MG TBEC, Take 1 tablet (375 mg total) by mouth 2 (two) times a day with meals, Disp: 60 tablet, Rfl: 0    rivaroxaban (Xarelto) 20 mg tablet, Take 20 mg by mouth, Disp: , Rfl:     thyroid (ARMOUR) 60 MG tablet, Take 60 mg by mouth daily, Disp: , Rfl:     ALLERGIES:  Allergies   Allergen Reactions    Other Other (See Comments)     Any "steroids" including prednisone and eye steroids; causes muscle weakness, joint pain        REVIEW OF SYSTEMS:  MSK: Left knee pain  Neuro: No numbness or paresthesia  Pertinent items are otherwise noted in HPI. A comprehensive review of systems was otherwise negative.     LABS:  HgA1c: No results found for: "HGBA1C"  BMP:   Lab Results   Component Value Date    GLUCOSE 98 11/01/2023    CO2 27 11/01/2023     CBC: No components found for: "CBC"    _____________________________________________________  PHYSICAL EXAMINATION:  Vital signs: /74   Pulse 73   Ht 5' 4" (1.626 m)   Wt 58 kg (127 lb 13.9 oz)   BMI 21.95 kg/m² General: No acute distress, awake and alert  Psychiatric: Mood and affect appear appropriate  HEENT: Trachea Midline, No torticollis, no apparent facial trauma  Cardiovascular: No audible murmurs; Extremities appear perfused  Pulmonary: No audible wheezing or stridor  Skin: No open lesions; see further details (if any) below    MUSCULOSKELETAL EXAMINATION:  Extremities:    Left knee: The left knee was exposed inspected. The patient was taken out of her left knee immobilizer. The patient has a small superficial abrasion over the anterior aspect of her knee which is not full-thickness and is just through the epidermis. The patient has some redness and some swelling surrounding the anterior aspect of her knee. She has tenderness to palpation over the anterior aspect of the patella with no palpable gap over the quadriceps tendon, patella, patellar tendon. The patient is able to achieve active straight leg raise without extensor lag. The patient's nontender over the medial lateral joint lines. Patient's sensation is intact to light touch in superficial peroneal, deep peroneal, sural, saphenous, plantar nerve distributions. Tibialis anterior, extensor hallux longus, gastrocnemius muscles are intact. Limb is well-perfused. Compartment soft compressible      _____________________________________________________  STUDIES REVIEWED:  I personally reviewed the images and interpretation is as follows:  Left knee x-ray: X-rays of the left knee demonstrate a horizontally oriented nondisplaced patella fracture with no increase in displacement from her emergency department views.   No other acute fractures dislocations noted    PROCEDURES PERFORMED:  Procedures    Misa Medici

## 2023-12-18 ENCOUNTER — OFFICE VISIT (OUTPATIENT)
Dept: OBGYN CLINIC | Facility: CLINIC | Age: 78
End: 2023-12-18
Payer: MEDICARE

## 2023-12-18 ENCOUNTER — APPOINTMENT (OUTPATIENT)
Dept: RADIOLOGY | Facility: AMBULARY SURGERY CENTER | Age: 78
End: 2023-12-18
Attending: STUDENT IN AN ORGANIZED HEALTH CARE EDUCATION/TRAINING PROGRAM
Payer: MEDICARE

## 2023-12-18 VITALS
DIASTOLIC BLOOD PRESSURE: 79 MMHG | HEART RATE: 89 BPM | WEIGHT: 127 LBS | HEIGHT: 64 IN | SYSTOLIC BLOOD PRESSURE: 152 MMHG | BODY MASS INDEX: 21.68 KG/M2

## 2023-12-18 DIAGNOSIS — S82.045D CLOSED NONDISPLACED COMMINUTED FRACTURE OF LEFT PATELLA WITH ROUTINE HEALING, SUBSEQUENT ENCOUNTER: Primary | ICD-10-CM

## 2023-12-18 DIAGNOSIS — S82.045A CLOSED NONDISPLACED COMMINUTED FRACTURE OF LEFT PATELLA, INITIAL ENCOUNTER: ICD-10-CM

## 2023-12-18 PROCEDURE — 99024 POSTOP FOLLOW-UP VISIT: CPT | Performed by: STUDENT IN AN ORGANIZED HEALTH CARE EDUCATION/TRAINING PROGRAM

## 2023-12-18 PROCEDURE — 99213 OFFICE O/P EST LOW 20 MIN: CPT | Performed by: STUDENT IN AN ORGANIZED HEALTH CARE EDUCATION/TRAINING PROGRAM

## 2023-12-18 PROCEDURE — 73560 X-RAY EXAM OF KNEE 1 OR 2: CPT

## 2023-12-18 NOTE — PROGRESS NOTES
Orthopaedics Office Visit -new patient Visit    ASSESSMENT/PLAN:    Assessment:   Left patella nondisplaced horizontal fracture sustained 11/1/2023    Plan:   Patient currently 6 week from injury date  Patient to continue physical therapy  Patient will transition from home physical therapy to outpatient physical therapy  Patient's current fully weightbearing as tolerated with the knee locked in extension.  Patient will continue with weightbearing locked in extension until 8 weeks out from the date of injury and then can progress to range of motion and ambulation with the knee brace unlocked.  She will continue ambulating with the knee brace unlocked and in place for 4 weeks after initiation and then can discontinue the brace.  Continue increasing range of motion settings by 10 degrees of flexion each week.  Patient currently set to 0 to 90 degrees   Patient uses Xarelto longstanding  Patient encouraged to continue to work with PCP for osteoporosis treatment  Follow up in 6 weeks with repeat x-rays       To Do Next Visit:  Repeat x-rays of left knee    _____________________________________________________  CHIEF COMPLAINT:  Chief Complaint   Patient presents with    Left Knee - Follow-up         SUBJECTIVE:  Natali Hernandez is a 78 y.o. female who presents for initial evaluation of left knee s/p fall 11/1/2023 with history of left total hip arthroplasty 7 months ago in [place].  She states she has fallen severe times due to medication for treatment of Afib, Dilitiazem.  Today she complains of decreasing left anterior knee pain.  She has been complaint with left LE immobilizer brace.  She has been weight bearing with use of SPC.  Patient denies any numbness or paresthesias in the left lower extremity.  Denies any previous knee injury.    Interval history 12/18/2023:  The patient presents about 6 weeks s/p left horizontal patella fracture sustained 11/1/2023.  She is doing well.  Today she has no left knee pain  "complaints.  She has been complaint with T-rom brace in locked position for ambulation yet unlocked for rest.  She does use SPC.  She continues physical therapy with visiting physical therapist with benefit.  She denies medications for this issue.  She denies numbness or tingling.      PAST MEDICAL HISTORY:  Past Medical History:   Diagnosis Date    A-fib ([facility])     History of loop recorder     Multiple falls     Pacemaker     Thyroid disease        PAST SURGICAL HISTORY:  Past Surgical History:   Procedure Laterality Date    APPENDECTOMY      BILATERAL HIP ARTHROSCOPY Right     CARDIAC LOOP RECORDER      CARDIAC PACEMAKER PLACEMENT         FAMILY HISTORY:  History reviewed. No pertinent family history.    SOCIAL HISTORY:  Social History     Tobacco Use    Smoking status: Unknown       MEDICATIONS:    Current Outpatient Medications:     acetaminophen (TYLENOL) 325 mg tablet, Take 2 tablets (650 mg total) by mouth every 6 (six) hours as needed for mild pain, Disp: , Rfl: 0    carvedilol (COREG) 3.125 mg tablet, Take 3.125 mg by mouth 2 (two) times a day with meals, Disp: , Rfl:     diltiazem (DILACOR XR) 240 MG 24 hr capsule, Take 240 mg by mouth daily, Disp: , Rfl:     fexofenadine (ALLEGRA) 180 MG tablet, Take 180 mg by mouth daily, Disp: , Rfl:     methocarbamol (ROBAXIN) 500 mg tablet, Take 1 tablet (500 mg total) by mouth 4 (four) times a day, Disp: 65 tablet, Rfl: 0    naproxen (EC NAPROSYN) 375 MG TBEC, Take 1 tablet (375 mg total) by mouth 2 (two) times a day with meals, Disp: 60 tablet, Rfl: 0    rivaroxaban (Xarelto) 20 mg tablet, Take 20 mg by mouth, Disp: , Rfl:     thyroid (ARMOUR) 60 MG tablet, Take 60 mg by mouth daily, Disp: , Rfl:     ALLERGIES:  Allergies   Allergen Reactions    Other Other (See Comments)     Any \"steroids\" including prednisone and eye steroids; causes muscle weakness, joint pain        REVIEW OF SYSTEMS:  MSK: Left knee pain  Neuro: No numbness or paresthesia  Pertinent items are " "otherwise noted in HPI.  A comprehensive review of systems was otherwise negative.    LABS:  HgA1c: No results found for: \"HGBA1C\"  BMP:   Lab Results   Component Value Date    GLUCOSE 98 11/01/2023    CO2 27 11/01/2023     CBC: No components found for: \"CBC\"    _____________________________________________________  PHYSICAL EXAMINATION:  Vital signs: /79 (BP Location: Left arm, Patient Position: Sitting, Cuff Size: Standard)   Pulse 89   Ht 5' 4\" (1.626 m)   Wt 57.6 kg (127 lb)   BMI 21.80 kg/m²   General: No acute distress, awake and alert  Psychiatric: Mood and affect appear appropriate  HEENT: Trachea Midline, No torticollis, no apparent facial trauma  Cardiovascular: No audible murmurs; Extremities appear perfused  Pulmonary: No audible wheezing or stridor  Skin: No open lesions; see further details (if any) below    MUSCULOSKELETAL EXAMINATION:  Extremities:    Left knee:  The left knee was exposed inspected.  The patient was taken out of her left knee immobilizer.  Skin is intact without erythema or induration.  She has mild tenderness to palpation over the anterior aspect of the patella with no palpable gap over the quadriceps tendon, patella, patellar tendon.  The patient is able to achieve active straight leg raise without extensor lag.  The patient's nontender over the medial lateral joint lines.  Patient is able to be flexed to 110 degrees in the office.  Denies pain with this range of motion.  Returning quadricep strength.  Patient's sensation is intact to light touch in superficial peroneal, deep peroneal, sural, saphenous, plantar nerve distributions.  Tibialis anterior, extensor hallux longus, gastrocnemius muscles are intact.  Limb is well-perfused.  Compartment soft compressible      _____________________________________________________  STUDIES REVIEWED:  I personally reviewed the images and interpretation is as follows:  Left knee x-ray: X-rays of the left knee demonstrate a " horizontally oriented nondisplaced patella fracture with no increase in displacement from last visit's views.  Fracture line still visualized.  No gapping at the fracture site from previous radiographs.    PROCEDURES PERFORMED:  Procedures    Ryan Prieto

## 2023-12-20 ENCOUNTER — EVALUATION (OUTPATIENT)
Dept: PHYSICAL THERAPY | Facility: MEDICAL CENTER | Age: 78
End: 2023-12-20
Payer: MEDICARE

## 2023-12-20 DIAGNOSIS — S82.045D CLOSED NONDISPLACED COMMINUTED FRACTURE OF LEFT PATELLA WITH ROUTINE HEALING, SUBSEQUENT ENCOUNTER: Primary | ICD-10-CM

## 2023-12-20 PROCEDURE — 97112 NEUROMUSCULAR REEDUCATION: CPT

## 2023-12-20 PROCEDURE — 97162 PT EVAL MOD COMPLEX 30 MIN: CPT

## 2023-12-20 NOTE — PROGRESS NOTES
PT Evaluation     Today's date: 2023  Patient name: Natali Hernandez  : 1945  MRN: [id number]  Referring provider: Juan José Landry DO  Dx:   Encounter Diagnosis     ICD-10-CM    1. Closed nondisplaced comminuted fracture of left patella with routine healing, subsequent encounter  S82.045D Ambulatory referral to Physical Therapy          Start Time: 1445  Stop Time: 1530  Total time in clinic (min): 45 minutes    Assessment  Assessment details: Patient is a 78 year old female reporting to therapy with the referring diagnosis of  Closed nondisplaced comminuted fracture of left patella with routine healing, subsequent encounter. Date of injury was 23. Patient is currently seven weeks post injury today and has been doing home health pt prior to her initial evaluation today. Upon exam, patient presents with deficits in knee rom, gait, and quad set muscle activation. Patient currently wear a trom brace locked in extension for ambulation. Due to patient's injury and restrictions patient is limited with adls. She will benefit from skilled physical therapy in order to maximize her function post injury. Patient was educated today on continued adherence with brace as well as her referring providers orders. Initiated home exercises which the patient tolerated well and without discomfort.             Impairments: abnormal gait, abnormal or restricted ROM, abnormal movement, activity intolerance, impaired balance, impaired physical strength and lacks appropriate home exercise program    Symptom irritability: lowUnderstanding of Dx/Px/POC: good   Prognosis: good    Goals  STG  -Patient will be IND with basic level HEP within 4 weeks in order to make improvements at home   - Patient will have wnl quad set within 4 weeks  - Patient will have improving knee rom per referring providers guidelines within 4 weeks   - patient gait will be progressed within referring providers guidelines within 4 weeks    LTG  -Patient  will be IND with HEP within 12 weeks in order to make improvements at home   - Patient will wfl gait mechanics within 12 weeks  - patient will have wfl knee rom within 10 weeks  - patient will return to adls within 12 weeks        Plan  Plan details: Skilled PT to improve ROM, strength, endurance, gait, and function.   See subjective for referring providers orders.  Patient would benefit from: PT eval and skilled physical therapy  Referral necessary: No  Planned modality interventions: cryotherapy and thermotherapy: hydrocollator packs  Planned therapy interventions: joint mobilization, IASTM, manual therapy, massage, muscle pump exercises, neuromuscular re-education, patient education, postural training, flexibility, functional ROM exercises, gait training, graded activity, strengthening, stretching, therapeutic activities, therapeutic exercise, therapeutic training, transfer training, graded exercise, graded motor, home exercise program, body mechanics training, abdominal trunk stabilization, ADL retraining, balance/weight bearing training and balance  Frequency: 2x week  Duration in weeks: 12  Plan of Care beginning date: 12/20/2023  Plan of Care expiration date: 3/13/2024  Treatment plan discussed with: patient        Subjective Evaluation    History of Present Illness  Mechanism of injury: Patient reports on 11/1 she was walking her dog when she had a fall and broke her knee cap. No surgery was performed. Patient is currently seven week post injury today. She reports she has not been having any pain. She presents with a locked knee brace today which she reports compliance with. Patient has also been using a spc out in the community but reports around the home she has not needed it. She is WBAT per doctors note. She reports she has been having home therapy for the past six weeks. She reports no numbness or tingling symptoms present. She reports her goals for therapy are to get out of her brace and walk without  "an AD.    PMH: Left hip replacement seven months ago, right derrell in     Referring providers orders  \"Evaluate and Treat     Status post left patella fracture sustained on 2023   Patient's current nightly weightbearing as tolerated with the knee locked in extension.  Patient will continue with weightbearing locked in extension until 8 weeks out from the date of injury and then can progress to range of motion and ambulation with the knee brace unlocked.  She will continue ambulating with the knee brace unlocked and in place for 4 weeks after initiation and then can discontinue the brace.  Continue increasing range of motion settings by 10 degrees of flexion each week.  Patient currently set to 0 to 90 degrees \"        Patient Goals  Patient goals for therapy: increased strength, independence with ADLs/IADLs, return to sport/leisure activities, increased motion, improved balance and decreased pain    Pain  Current pain ratin  At best pain ratin  At worst pain ratin  Location: stiffness in the morning ranked 6/10, improved with her exercises and motion  Quality: tight  Progression: improved    Treatments  No previous or current treatments        Objective     Tenderness     Additional Tenderness Details  No joint line or posterior knee ttp     Neurological Testing     Sensation     Knee   Left Knee   Intact: Light touch    Right Knee   Intact: light touch     Reflexes   Left   Achilles (S1): normal (2+)    Right   Achilles (S1): normal (2+)    Passive Range of Motion   Left Knee   Flexion: 90 degrees   Extension: 0 degrees     Strength/Myotome Testing     Left Hip   Planes of Motion   Flexion: 4  Extension: 4+    Right Hip   Planes of Motion   Flexion: 4  Extension: 4+    Left Knee   Quadriceps contraction: fair    Right Knee   Quadriceps contraction: good    Additional Strength Details  Defer knee mmt due to protocol  SLR- able to perform without lag    Ambulation     Observational Gait   Decreased " "walking speed and stride length.     Additional Observational Gait Details  WBAT with spc and trom locked in extension             Precautions:   Past Medical History:   Diagnosis Date    A-fib (HCC)     History of loop recorder     Multiple falls     Pacemaker     Thyroid disease      Eval/Re-Eval POC Expires Auth #/ Referral # Total Visits Start Date Expiration Date Extension Info Visits Limitation   12/20  3/13   no auth- mc and secondary           bomn and secondary bomn                                                1 2 3 4 5 6   12/20        7 8 9 10 11 12           13 14 15 16 17 18           19 20 21 22 23 24           25 26 27 28 29 30             Manuals 12/20             Knee prom                                                     Neuro Re-Ed             Quad set X10 2\"             SLR  x10            Standing hip abd 2x10             Weight shifting                                                                  Ther Ex             Heel slide prom  10x5\"            Sitting knee flexion              Calf raise             Seated hss                                                                  Ther Activity                                       Gait Training                                       Modalities                                            "

## 2023-12-27 ENCOUNTER — OFFICE VISIT (OUTPATIENT)
Dept: PHYSICAL THERAPY | Facility: MEDICAL CENTER | Age: 78
End: 2023-12-27
Payer: MEDICARE

## 2023-12-27 DIAGNOSIS — S82.045D CLOSED NONDISPLACED COMMINUTED FRACTURE OF LEFT PATELLA WITH ROUTINE HEALING, SUBSEQUENT ENCOUNTER: Primary | ICD-10-CM

## 2023-12-27 PROCEDURE — 97140 MANUAL THERAPY 1/> REGIONS: CPT

## 2023-12-27 PROCEDURE — 97112 NEUROMUSCULAR REEDUCATION: CPT

## 2023-12-27 PROCEDURE — 97110 THERAPEUTIC EXERCISES: CPT

## 2023-12-27 NOTE — PROGRESS NOTES
"Daily Note     Today's date: 2023  Patient name: Natali Hernandez  : 1945  MRN: [id number]  Referring provider: Juan José Landry DO  Dx:   Encounter Diagnosis     ICD-10-CM    1. Closed nondisplaced comminuted fracture of left patella with routine healing, subsequent encounter  S82.045D           Start Time: 1527  Stop Time: 1606  Total time in clinic (min): 39 minutes    Subjective: Patient reports her knee has been good. She reports no pain today. Achiness has been present in her hip but she states the last few days it has been better.       Objective: See treatment diary below      Assessment: Tolerated treatment well. Worked on knee rom today and increased knee flexion rom to recommended guidelines from referring provider. Patient tolerated this well and without any discomfort. Mild fatigue present on occasion resulting in brief recovery breaks. Patient reports she feels \"good\" following today's session.  Patient would benefit from continued PT      Plan: Continue per plan of care.      Precautions:   Past Medical History:   Diagnosis Date    A-fib ([facility])     History of loop recorder     Multiple falls     Pacemaker     Thyroid disease      Eval/Re-Eval POC Expires Auth #/ Referral # Total Visits Start Date Expiration Date Extension Info Visits Limitation   12/20  3/13   no auth- mc and secondary           bomn and secondary bomn                                                1 2 3 4 5 6          7 8 9 10 11 12           13 14 15 16 17 18           19 20 21 22 23 24           25 26 27 28 29 30             \"Evaluate and Treat     Status post left patella fracture sustained on 2023   Patient's current nightly weightbearing as tolerated with the knee locked in extension.  Patient will continue with weightbearing locked in extension until 8 weeks out from the date of injury and then can progress to range of motion and ambulation with the knee brace unlocked.  She will continue ambulating " "with the knee brace unlocked and in place for 4 weeks after initiation and then can discontinue the brace.  Continue increasing range of motion settings by 10 degrees of flexion each week.  Patient currently set to 0 to 90 degrees\"     Manuals 12/20 12/27           Knee prom   TE                                                  Neuro Re-Ed             Quad set X10 2\"  2x10 2\"           SLR  x10 2x10           Standing hip abd 2x10  2x10           Weight shifting                                                                  Ther Ex             Heel slide prom  10x5\" 2x10           Sitting knee flexion   x10           Calf raise  2x10           Seated hss   3x20\"                                                                Ther Activity                                       Gait Training                                       Modalities                                            "

## 2023-12-29 ENCOUNTER — OFFICE VISIT (OUTPATIENT)
Dept: PHYSICAL THERAPY | Facility: MEDICAL CENTER | Age: 78
End: 2023-12-29
Payer: MEDICARE

## 2023-12-29 DIAGNOSIS — S82.045D CLOSED NONDISPLACED COMMINUTED FRACTURE OF LEFT PATELLA WITH ROUTINE HEALING, SUBSEQUENT ENCOUNTER: Primary | ICD-10-CM

## 2023-12-29 PROCEDURE — 97116 GAIT TRAINING THERAPY: CPT

## 2023-12-29 PROCEDURE — 97140 MANUAL THERAPY 1/> REGIONS: CPT

## 2023-12-29 NOTE — PROGRESS NOTES
"Daily Note     Today's date: 2023  Patient name: Natali Hernandez  : 1945  MRN: [id number]  Referring provider: Juan José Landry DO  Dx:   Encounter Diagnosis     ICD-10-CM    1. Closed nondisplaced comminuted fracture of left patella with routine healing, subsequent encounter  S82.045D             Start Time: 0955  Stop Time: 1038  Total time in clinic (min): 43 minutes    Subjective: Patient reports her knee is \"not too bad\" . She reports no pain today.    Objective: See treatment diary below      Assessment: Tolerated treatment well. Patient is 8 weeks out from date of injury. Per referring providers orders, initiated unlocking  of brace for ambulation today. Patient tolerated this well with gait training with spc. No pain, unsteadiness, or instability  noted. We will continue unlocking of knee brace for ambulation gradually. Patient has good tolerance to following therapeutic exercises. Patient would benefit from continued PT      Plan: Continue per plan of care.      Precautions:   Past Medical History:   Diagnosis Date    A-fib (HCC)     History of loop recorder     Multiple falls     Pacemaker     Thyroid disease      Eval/Re-Eval POC Expires Auth #/ Referral # Total Visits Start Date Expiration Date Extension Info Visits Limitation   12/20  3/13   no auth- mc and secondary           bomn and secondary bomn                                                1 2 3 4 5 6         7 8 9 10 11 12           13 14 15 16 17 18           19 20 21 22 23 24           25 26 27 28 29 30             \"Evaluate and Treat     Status post left patella fracture sustained on 2023   Patient's current nightly weightbearing as tolerated with the knee locked in extension.  Patient will continue with weightbearing locked in extension until 8 weeks out from the date of injury and then can progress to range of motion and ambulation with the knee brace unlocked.  She will continue ambulating with the knee " "brace unlocked and in place for 4 weeks after initiation and then can discontinue the brace.  Continue increasing range of motion settings by 10 degrees of flexion each week.  Patient currently set to 0 to 90 degrees\"     PT 1:1 time 1132-2962  Manuals 12/20 12/27 12/29          Knee prom   TE TE                                                 Neuro Re-Ed             Quad set X10 2\"  2x10 2\" 2x10 2\"          SLR  x10 2x10 2x10          Standing hip abd 2x10  2x10 2x10          Weight shifting    20x lat                                                              Ther Ex             Heel slide prom  10x5\" 2x10 2x10          Sitting knee flexion   x10 2x10          Calf raise  2x10 2x10          Seated hss   3x20\"  3x20\"                                                               Ther Activity                                       Gait Training                Performed TE with spc 10'                        Modalities                                            "

## 2024-01-03 ENCOUNTER — OFFICE VISIT (OUTPATIENT)
Dept: PHYSICAL THERAPY | Facility: MEDICAL CENTER | Age: 79
End: 2024-01-03
Payer: COMMERCIAL

## 2024-01-03 DIAGNOSIS — S82.045D CLOSED NONDISPLACED COMMINUTED FRACTURE OF LEFT PATELLA WITH ROUTINE HEALING, SUBSEQUENT ENCOUNTER: Primary | ICD-10-CM

## 2024-01-03 PROCEDURE — 97140 MANUAL THERAPY 1/> REGIONS: CPT

## 2024-01-03 PROCEDURE — 97110 THERAPEUTIC EXERCISES: CPT

## 2024-01-03 PROCEDURE — 97112 NEUROMUSCULAR REEDUCATION: CPT

## 2024-01-03 NOTE — PROGRESS NOTES
"Daily Note     Today's date: 1/3/2024  Patient name: Natali Hernandez  : 1945  MRN: [id number]  Referring provider: Juan José Landry DO  Dx:   Encounter Diagnosis     ICD-10-CM    1. Closed nondisplaced comminuted fracture of left patella with routine healing, subsequent encounter  S82.045D               Start Time: 1230  Stop Time: 1310  Total time in clinic (min): 40 minutes    Subjective: Patient reports that her knee has been \"good\". She denies any pain. She has been feeling steady since initiating unlocking her brace, no losses of balance or falls.       Objective: See treatment diary below      Assessment: Tolerated treatment well. Continued with flexion rom progressions in accordance with referring providers orders. Educated patient on goal of progression to 110 degrees flexion on brace. Patient was educated in previous session on brace settings. Additionally, continued with gradual unlocking of brace for ambulation, patient currently set 0-60 for ambulation. Patient tolerated this well, no unsteadiness noted. Patient would benefit from continued PT      Plan: Continue per plan of care.      Precautions:   Past Medical History:   Diagnosis Date    A-fib (HCC)     History of loop recorder     Multiple falls     Pacemaker     Thyroid disease      Eval/Re-Eval POC Expires Auth #/ Referral # Total Visits Start Date Expiration Date Extension Info Visits Limitation   12/20  3/13   no auth- mc and secondary           bomn and secondary bomn                                                1 2 3 4 5 6   12/20 12/27 12/29 1/3     7 8 9 10 11 12           13 14 15 16 17 18           19 20 21 22 23 24           25 26 27 28 29 30             \"Evaluate and Treat     Status post left patella fracture sustained on 2023   Patient's current nightly weightbearing as tolerated with the knee locked in extension.  Patient will continue with weightbearing locked in extension until 8 weeks out from the date of injury and " "then can progress to range of motion and ambulation with the knee brace unlocked.  She will continue ambulating with the knee brace unlocked and in place for 4 weeks after initiation and then can discontinue the brace.  Continue increasing range of motion settings by 10 degrees of flexion each week.  Patient currently set to 0 to 90 degrees\"     Manuals 12/20  12/27 12/29 1/3         Knee prom   TE TE TE                                                 Neuro Re-Ed             Quad set X10 2\"  2x10 2\" 2x10 2\" 2x10 2\"          SLR  x10 2x10 2x10 2x10         Standing hip abd 2x10  2x10 2x10 2x10         Weight shifting    20x lat 20x lat                                                             Ther Ex             Heel slide prom  10x5\" 2x10 2x10 2x10         Sitting knee flexion   x10 2x10 2x10         Calf raise  2x10 2x10 2x10         Seated hss   3x20\"  3x20\"  3x20\"                                                              Ther Activity                                       Gait Training                Performed TE with spc 10'                        Modalities                                            "

## 2024-01-05 ENCOUNTER — OFFICE VISIT (OUTPATIENT)
Dept: PHYSICAL THERAPY | Facility: MEDICAL CENTER | Age: 79
End: 2024-01-05
Payer: COMMERCIAL

## 2024-01-05 DIAGNOSIS — S82.045D CLOSED NONDISPLACED COMMINUTED FRACTURE OF LEFT PATELLA WITH ROUTINE HEALING, SUBSEQUENT ENCOUNTER: Primary | ICD-10-CM

## 2024-01-05 PROCEDURE — 97140 MANUAL THERAPY 1/> REGIONS: CPT

## 2024-01-05 PROCEDURE — 97112 NEUROMUSCULAR REEDUCATION: CPT

## 2024-01-05 PROCEDURE — 97110 THERAPEUTIC EXERCISES: CPT

## 2024-01-05 NOTE — PROGRESS NOTES
"Daily Note     Today's date: 2024  Patient name: Natali Hernandez  : 1945  MRN: [id number]  Referring provider: Juan José Landry DO  Dx:   Encounter Diagnosis     ICD-10-CM    1. Closed nondisplaced comminuted fracture of left patella with routine healing, subsequent encounter  S82.045D               Start Time: 1010  Stop Time: 1051  Total time in clinic (min): 41 minutes    Subjective: Patient reports that she is doing \"good\" today. She has no complaints. She denies any pain.     Objective: See treatment diary below      Assessment: Tolerated treatment well. Patient has good performance with exercises and rom, no pain noted. Continued with exercise program charted below. Patient would benefit from continued PT in order to maximize function post injury.       Plan: Continue per plan of care.      Precautions:   Past Medical History:   Diagnosis Date    A-fib (HCC)     History of loop recorder     Multiple falls     Pacemaker     Thyroid disease      Eval/Re-Eval POC Expires Auth #/ Referral # Total Visits Start Date Expiration Date Extension Info Visits Limitation   12/20  3/13   no auth- mc and secondary           bomn and secondary bomn                                                1 2 3 4 5 6   12/20 12/27 12/29 1/3 1/5    7 8 9 10 11 12           13 14 15 16 17 18           19 20 21 22 23 24           25 26 27 28 29 30             \"Evaluate and Treat     Status post left patella fracture sustained on 2023   Patient's current nightly weightbearing as tolerated with the knee locked in extension.  Patient will continue with weightbearing locked in extension until 8 weeks out from the date of injury and then can progress to range of motion and ambulation with the knee brace unlocked.  She will continue ambulating with the knee brace unlocked and in place for 4 weeks after initiation and then can discontinue the brace.  Continue increasing range of motion settings by 10 degrees of flexion each week.  " "Patient currently set to 0 to 90 degrees\"     Manuals 12/20  12/27 12/29 1/3 1/5        Knee prom   TE TE TE  TE                                               Neuro Re-Ed             Quad set X10 2\"  2x10 2\" 2x10 2\" 2x10 2\"  2x10 2\"        SLR  x10 2x10 2x10 2x10 2x10        Standing hip abd 2x10  2x10 2x10 2x10 2x10        Weight shifting    20x lat 20x lat 20x lat         Standing hip ext     2x10                                               Ther Ex             Heel slide prom  10x5\" 2x10 2x10 2x10 2x10        Sitting knee flexion   x10 2x10 2x10 2x10        Calf raise  2x10 2x10 2x10 2x10        Seated hss   3x20\"  3x20\"  3x20\"  3x20\"                                                             Ther Activity                                       Gait Training                Performed TE with spc 10'                        Modalities                                            "

## 2024-01-08 ENCOUNTER — OFFICE VISIT (OUTPATIENT)
Dept: PHYSICAL THERAPY | Facility: MEDICAL CENTER | Age: 79
End: 2024-01-08
Payer: COMMERCIAL

## 2024-01-08 DIAGNOSIS — S82.045D CLOSED NONDISPLACED COMMINUTED FRACTURE OF LEFT PATELLA WITH ROUTINE HEALING, SUBSEQUENT ENCOUNTER: Primary | ICD-10-CM

## 2024-01-08 PROCEDURE — 97140 MANUAL THERAPY 1/> REGIONS: CPT

## 2024-01-08 PROCEDURE — 97110 THERAPEUTIC EXERCISES: CPT

## 2024-01-08 PROCEDURE — 97112 NEUROMUSCULAR REEDUCATION: CPT

## 2024-01-08 NOTE — PROGRESS NOTES
"Daily Note     Today's date: 2024  Patient name: Natali Hernandez  : 1945  MRN: [id number]  Referring provider: Juan José Landry DO  Dx:   Encounter Diagnosis     ICD-10-CM    1. Closed nondisplaced comminuted fracture of left patella with routine healing, subsequent encounter  S82.045D                 Start Time: 1223  Stop Time: 1308  Total time in clinic (min): 45 minutes    Subjective: Patient reports her \"exercises are going good\". She offers no reports of pain or complaints. She reports she has been walking well. She has not needed her cane in the house, no unsteadiness noted. Patient educated on safety with this.         Objective: See treatment diary below      Assessment: Tolerated treatment well. Patient has good performance with exercises today. Patient was able to achieve passive knee flexion motion in brace to 110 degrees today without pain or discomfort. Patient would benefit from continued PT in order to maximize function post injury.       Plan: Continue per plan of care.      Precautions:   Past Medical History:   Diagnosis Date    A-fib (HCC)     History of loop recorder     Multiple falls     Pacemaker     Thyroid disease      Eval/Re-Eval POC Expires Auth #/ Referral # Total Visits Start Date Expiration Date Extension Info Visits Limitation   12/20  3/13   no auth- mc and secondary           bomn and secondary bomn                                                1 2 3 4 5 6   12/20 12/27 12/29 1/3 1/5 1/8   7 8 9 10 11 12           13 14 15 16 17 18           19 20 21 22 23 24           25 26 27 28 29 30             \"Evaluate and Treat     Status post left patella fracture sustained on 2023   Patient's current nightly weightbearing as tolerated with the knee locked in extension.  Patient will continue with weightbearing locked in extension until 8 weeks out from the date of injury and then can progress to range of motion and ambulation with the knee brace unlocked.  She will " "continue ambulating with the knee brace unlocked and in place for 4 weeks after initiation and then can discontinue the brace.  Continue increasing range of motion settings by 10 degrees of flexion each week.  Patient currently set to 0 to 90 degrees\"     Manuals 12/20  12/27 12/29 1/3 1/5 1/8       Knee prom   TE TE TE  TE TE                                              Neuro Re-Ed             Quad set X10 2\"  2x10 2\" 2x10 2\" 2x10 2\"  2x10 2\" 2x10 2\"       SLR  x10 2x10 2x10 2x10 2x10 2x10       Standing hip abd 2x10  2x10 2x10 2x10 2x10 2x10       Weight shifting    20x lat 20x lat 20x lat  20x lat        Standing hip ext     2x10 2x10                                              Ther Ex             Heel slide prom  10x5\" 2x10 2x10 2x10 2x10 2x10       Sitting knee flexion   x10 2x10 2x10 2x10 2x10       Calf raise  2x10 2x10 2x10 2x10 2x10       Seated hss   3x20\"  3x20\"  3x20\"  3x20\"  3x20\"                                                           Ther Activity                                       Gait Training                Performed TE with spc 10'                        Modalities                                            "

## 2024-01-10 ENCOUNTER — OFFICE VISIT (OUTPATIENT)
Dept: PHYSICAL THERAPY | Facility: MEDICAL CENTER | Age: 79
End: 2024-01-10
Payer: COMMERCIAL

## 2024-01-10 DIAGNOSIS — S82.045D CLOSED NONDISPLACED COMMINUTED FRACTURE OF LEFT PATELLA WITH ROUTINE HEALING, SUBSEQUENT ENCOUNTER: Primary | ICD-10-CM

## 2024-01-10 PROCEDURE — 97140 MANUAL THERAPY 1/> REGIONS: CPT

## 2024-01-10 PROCEDURE — 97112 NEUROMUSCULAR REEDUCATION: CPT

## 2024-01-10 PROCEDURE — 97110 THERAPEUTIC EXERCISES: CPT

## 2024-01-10 NOTE — PROGRESS NOTES
"Daily Note     Today's date: 1/10/2024  Patient name: Natali Hernandez  : 1945  MRN: [id number]  Referring provider: Juan José Landry DO  Dx:   Encounter Diagnosis     ICD-10-CM    1. Closed nondisplaced comminuted fracture of left patella with routine healing, subsequent encounter  S82.045D                   Start Time: 1226  Stop Time: 1310  Total time in clinic (min): 44 minutes    Subjective: Patient reports her knee is \"good\". She denies any pain or complaints. She reports her walking has been \"good\".      Objective: See treatment diary below      Assessment: Tolerated treatment well. Knee flexion rom settings progressed 10 degrees today as we continue with progressions as laid out by referring provider. Patient tolerated this well. Additionally continued with progressions of unlocked brace with ambulation. No pain or symptom irritation noted with therapy or exercises today.  Patient would benefit from continued PT in order to maximize function post injury.       Plan: Continue per plan of care.      Precautions:   Past Medical History:   Diagnosis Date    A-fib (HCC)     History of loop recorder     Multiple falls     Pacemaker     Thyroid disease      Eval/Re-Eval POC Expires Auth #/ Referral # Total Visits Start Date Expiration Date Extension Info Visits Limitation   12/20  3/13   no auth- mc and secondary           bomn and secondary bomn                                                1 2 3 4 5 6   12/20 12/27 12/29 1/3 1/5 1/8   7 8 9 10 11 12   1/10        13 14 15 16 17 18           19 20 21 22 23 24           25 26 27 28 29 30             \"Evaluate and Treat     Status post left patella fracture sustained on 2023   Patient's current nightly weightbearing as tolerated with the knee locked in extension.  Patient will continue with weightbearing locked in extension until 8 weeks out from the date of injury and then can progress to range of motion and ambulation with the knee brace unlocked.  She " "will continue ambulating with the knee brace unlocked and in place for 4 weeks after initiation and then can discontinue the brace.  Continue increasing range of motion settings by 10 degrees of flexion each week.  Patient currently set to 0 to 90 degrees\"     Manuals 12/20  12/27 12/29 1/3 1/5 1/8 1/10      Knee prom   TE TE TE  TE TE TE                                             Neuro Re-Ed             Quad set X10 2\"  2x10 2\" 2x10 2\" 2x10 2\"  2x10 2\" 2x10 2\" 2x10 2\"       SLR  x10 2x10 2x10 2x10 2x10 2x10 2x10      Standing hip abd 2x10  2x10 2x10 2x10 2x10 2x10 2x10       Weight shifting    20x lat 20x lat 20x lat  20x lat        Standing hip ext     2x10 2x10 2x10      Hamstring curls        2x10                                Ther Ex             Heel slide prom  10x5\" 2x10 2x10 2x10 2x10 2x10 2x10      Sitting knee flexion   x10 2x10 2x10 2x10 2x10 2x10      Calf raise  2x10 2x10 2x10 2x10 2x10 2x10      Seated hss   3x20\"  3x20\"  3x20\"  3x20\"  3x20\" 3x20\"                                                          Ther Activity                                       Gait Training                Performed TE with spc 10'                        Modalities                                            "

## 2024-01-15 ENCOUNTER — OFFICE VISIT (OUTPATIENT)
Dept: PHYSICAL THERAPY | Facility: MEDICAL CENTER | Age: 79
End: 2024-01-15
Payer: COMMERCIAL

## 2024-01-15 DIAGNOSIS — S82.045D CLOSED NONDISPLACED COMMINUTED FRACTURE OF LEFT PATELLA WITH ROUTINE HEALING, SUBSEQUENT ENCOUNTER: Primary | ICD-10-CM

## 2024-01-15 PROCEDURE — 97112 NEUROMUSCULAR REEDUCATION: CPT

## 2024-01-15 PROCEDURE — 97140 MANUAL THERAPY 1/> REGIONS: CPT

## 2024-01-15 PROCEDURE — 97110 THERAPEUTIC EXERCISES: CPT

## 2024-01-15 NOTE — PROGRESS NOTES
"Daily Note     Today's date: 1/15/2024  Patient name: Natali Hernandez  : 1945  MRN: [id number]  Referring provider: Juan José Landry DO  Dx:   Encounter Diagnosis     ICD-10-CM    1. Closed nondisplaced comminuted fracture of left patella with routine healing, subsequent encounter  S82.045D                     Start Time: 1143  Stop Time: 1225  Total time in clinic (min): 42 minutes    Subjective: Patient reports her knee feels \"good\". She denies any pain or complaints.       Objective: See treatment diary below      Assessment: Tolerated treatment well. Implemented alternating mini marching exercise today working on stability. Patient has good performance,  light UE used. Overall, patient continues to have good tolerance to exercises. Patient would benefit from continued PT in order to maximize function post injury.       Plan: Continue per plan of care.  Progress note next session      Precautions:   Past Medical History:   Diagnosis Date    A-fib (HCC)     History of loop recorder     Multiple falls     Pacemaker     Thyroid disease      Eval/Re-Eval POC Expires Auth #/ Referral # Total Visits Start Date Expiration Date Extension Info Visits Limitation   12/20  3/13   no auth- mc and secondary           bomn and secondary bomn                                                1 2 3 4 5 6   12/20 12/27 12/29 1/3 1/5 1/8   7 8 9 10 11 12   1/10 1/15       13 14 15 16 17 18           19 20 21 22 23 24           25 26 27 28 29 30             \"Evaluate and Treat     Status post left patella fracture sustained on 2023   Patient's current nightly weightbearing as tolerated with the knee locked in extension.  Patient will continue with weightbearing locked in extension until 8 weeks out from the date of injury and then can progress to range of motion and ambulation with the knee brace unlocked.  She will continue ambulating with the knee brace unlocked and in place for 4 weeks after initiation and then can " "discontinue the brace.  Continue increasing range of motion settings by 10 degrees of flexion each week.  Patient currently set to 0 to 90 degrees\"     Manuals 12/20  12/27 12/29 1/3 1/5 1/8 1/10 1/15     Knee prom   TE TE TE  TE TE TE TE                                            Neuro Re-Ed             Quad set X10 2\"  2x10 2\" 2x10 2\" 2x10 2\"  2x10 2\" 2x10 2\" 2x10 2\"  2x10 2\"      SLR  x10 2x10 2x10 2x10 2x10 2x10 2x10 2x10      Standing hip abd 2x10  2x10 2x10 2x10 2x10 2x10 2x10  2x10      Weight shifting    20x lat 20x lat 20x lat  20x lat   Alternating mini march  20x     Standing hip ext     2x10 2x10 2x10      Hamstring curls        2x10 2x10                               Ther Ex             Heel slide prom  10x5\" 2x10 2x10 2x10 2x10 2x10 2x10 2x10     Sitting knee flexion   x10 2x10 2x10 2x10 2x10 2x10 2x10     Calf raise  2x10 2x10 2x10 2x10 2x10 2x10 2x10      Seated hss   3x20\"  3x20\"  3x20\"  3x20\"  3x20\" 3x20\" 3x20\"                                                          Ther Activity                                       Gait Training                Performed TE with spc 10'                        Modalities                                            "

## 2024-01-17 ENCOUNTER — OFFICE VISIT (OUTPATIENT)
Dept: PHYSICAL THERAPY | Facility: MEDICAL CENTER | Age: 79
End: 2024-01-17
Payer: COMMERCIAL

## 2024-01-17 DIAGNOSIS — S82.045D CLOSED NONDISPLACED COMMINUTED FRACTURE OF LEFT PATELLA WITH ROUTINE HEALING, SUBSEQUENT ENCOUNTER: Primary | ICD-10-CM

## 2024-01-17 PROCEDURE — 97110 THERAPEUTIC EXERCISES: CPT

## 2024-01-17 PROCEDURE — 97530 THERAPEUTIC ACTIVITIES: CPT

## 2024-01-17 PROCEDURE — 97112 NEUROMUSCULAR REEDUCATION: CPT

## 2024-01-17 NOTE — PROGRESS NOTES
PT Re-Evaluation     Today's date: 2024  Patient name: Natali Hernandez  : 1945  MRN: [id number]  Referring provider: Juan José Landry DO  Dx:   Encounter Diagnosis     ICD-10-CM    1. Closed nondisplaced comminuted fracture of left patella with routine healing, subsequent encounter  S82.045D             Start Time: 1221  Stop Time: 1300  Total time in clinic (min): 39 minutes    Assessment  Assessment details: IE:  Patient is a 78 year old female reporting to therapy with the referring diagnosis of  Closed nondisplaced comminuted fracture of left patella with routine healing, subsequent encounter. Date of injury was 23. Patient is currently seven weeks post injury today and has been doing home health pt prior to her initial evaluation today. Upon exam, patient presents with deficits in knee rom, gait, and quad set muscle activation. Patient currently wear a trom brace locked in extension for ambulation. Due to patient's injury and restrictions patient is limited with adls. She will benefit from skilled physical therapy in order to maximize her function post injury. Patient was educated today on continued adherence with brace as well as her referring providers orders. Initiated home exercises which the patient tolerated well and without discomfort.     2024  Patient has completed 9 PT sessions to this date.  The patient has made improvements in knee ROM, however still has some deficits in strength demonstrated by some quadriceps lag during SLR. Patient was educated about knee progress thus far and how continued ROM and strengthening will further improve remaining limitations. Patient was educated about safety with exercises and educated on performing correctly and safely.  All other questions and concerns were addressed.  I believe this patient will continue to benefit from skilled PT to address remaining deficits in knee ROM and strength and improve activity tolerance and quality of  life.          Impairments: abnormal gait, abnormal or restricted ROM, abnormal movement, activity intolerance, impaired balance, impaired physical strength and lacks appropriate home exercise program    Symptom irritability: lowUnderstanding of Dx/Px/POC: good   Prognosis: good    Goals  STG  -Patient will be IND with basic level HEP within 4 weeks in order to make improvements at home MET  - Patient will have wnl quad set within 4 weeks MET  - Patient will have improving knee rom per referring providers guidelines within 4 weeks MET   - patient gait will be progressed within referring providers guidelines within 4 weeks MET    LTG  -Patient will be IND with HEP within 12 weeks in order to make improvements at home MET  - Patient will wfl gait mechanics within 12 weeks PROGRESSING  - patient will have wfl knee rom within 10 weeks PROGRESSING  - patient will return to adls within 12 weeks MET        Plan  Plan details: Skilled PT to improve ROM, strength, endurance, gait, and function.   See subjective for referring providers orders.  Patient would benefit from: PT eval and skilled physical therapy  Referral necessary: No  Planned modality interventions: cryotherapy and thermotherapy: hydrocollator packs  Planned therapy interventions: joint mobilization, IASTM, manual therapy, massage, muscle pump exercises, neuromuscular re-education, patient education, postural training, flexibility, functional ROM exercises, gait training, graded activity, strengthening, stretching, therapeutic activities, therapeutic exercise, therapeutic training, transfer training, graded exercise, graded motor, home exercise program, body mechanics training, abdominal trunk stabilization, ADL retraining, balance/weight bearing training and balance  Frequency: 2x week  Duration in weeks: 12  Plan of Care beginning date: 12/20/2023  Plan of Care expiration date: 3/13/2024  Treatment plan discussed with: patient        Subjective  "Evaluation    History of Present Illness  Mechanism of injury: IE:  Patient reports on  she was walking her dog when she had a fall and broke her knee cap. No surgery was performed. Patient is currently seven week post injury today. She reports she has not been having any pain. She presents with a locked knee brace today which she reports compliance with. Patient has also been using a spc out in the community but reports around the home she has not needed it. She is WBAT per doctors note. She reports she has been having home therapy for the past six weeks. She reports no numbness or tingling symptoms present. She reports her goals for therapy are to get out of her brace and walk without an AD.    PMH: Left hip replacement seven months ago, right derrell in 2015    Referring providers orders  \"Evaluate and Treat     Status post left patella fracture sustained on 2023   Patient's current nightly weightbearing as tolerated with the knee locked in extension.  Patient will continue with weightbearing locked in extension until 8 weeks out from the date of injury and then can progress to range of motion and ambulation with the knee brace unlocked.  She will continue ambulating with the knee brace unlocked and in place for 4 weeks after initiation and then can discontinue the brace.  Continue increasing range of motion settings by 10 degrees of flexion each week.  Patient currently set to 0 to 90 degrees \"    2024  Patient stated that she is not having any pain coming in. She stated that it sometimes feels stiff in the morning but other than that it feels  great. She stated that she feels as though she is 95% recovered.   Patient Goals  Patient goals for therapy: increased strength, independence with ADLs/IADLs, return to sport/leisure activities, increased motion, improved balance and decreased pain    Pain  Current pain ratin  At best pain ratin  At worst pain ratin  Progression: " "improved    Treatments  No previous or current treatments        Objective     Tenderness     Additional Tenderness Details  No joint line or posterior knee ttp     Neurological Testing     Sensation     Knee   Left Knee   Intact: Light touch    Right Knee   Intact: light touch     Reflexes   Left   Achilles (S1): normal (2+)    Right   Achilles (S1): normal (2+)    Active Range of Motion   Left Knee   Flexion: 105 degrees     Passive Range of Motion   Left Knee   Flexion: 120 degrees   Extension: 0 degrees     Strength/Myotome Testing     Left Hip   Planes of Motion   Flexion: 4  Extension: 4+    Right Hip   Planes of Motion   Flexion: 4  Extension: 4+    Left Knee   Quadriceps contraction: fair    Right Knee   Quadriceps contraction: good    Additional Strength Details  Defer knee mmt due to protocol  SLR- able to perform without lag    Ambulation     Observational Gait   Decreased walking speed and stride length.     Additional Observational Gait Details  WBAT with spc and trom locked in extension             Precautions:   Past Medical History:   Diagnosis Date    A-fib (HCC)     History of loop recorder     Multiple falls     Pacemaker     Thyroid disease      Eval/Re-Eval POC Expires Auth #/ Referral # Total Visits Start Date Expiration Date Extension Info Visits Limitation   12/20  3/13   no auth- mc and secondary           bomn and secondary bomn                                                1 2 3 4 5 6   12/20 12/27 12/29 1/3 1/5 1/8   7 8 9 10 11 12   1/10 1/15 1/17      13 14 15 16 17 18           19 20 21 22 23 24           25 26 27 28 29 30               Manuals 12/20  12/27 12/29 1/3 1/5 1/8 1/10 1/15 1/17    Knee prom   TE TE TE  TE TE TE TE                                            Neuro Re-Ed             Quad set X10 2\"  2x10 2\" 2x10 2\" 2x10 2\"  2x10 2\" 2x10 2\" 2x10 2\"  2x10 2\"  2x10 3\"    SLR  x10 2x10 2x10 2x10 2x10 2x10 2x10 2x10  2x10    Standing hip abd 2x10  2x10 2x10 2x10 2x10 2x10 2x10  " "2x10  2x10 ea    Weight shifting    20x lat 20x lat 20x lat  20x lat   Alternating mini march  20x Alternating mini march  20x    Standing hip ext     2x10 2x10 2x10  2x10 ea    Hamstring curls        2x10 2x10 2x10                              Ther Ex             Heel slide prom  10x5\" 2x10 2x10 2x10 2x10 2x10 2x10 2x10 2x10    Sitting knee flexion   x10 2x10 2x10 2x10 2x10 2x10 2x10 2x10    Calf raise  2x10 2x10 2x10 2x10 2x10 2x10 2x10  2x10    Seated hss   3x20\"  3x20\"  3x20\"  3x20\"  3x20\" 3x20\" 3x20\"  3x20\"                                                        Ther Activity             Reassessment of Function         10'                 Gait Training                Performed TE with spc 10'                        Modalities                                           "

## 2024-01-22 ENCOUNTER — OFFICE VISIT (OUTPATIENT)
Dept: PHYSICAL THERAPY | Facility: MEDICAL CENTER | Age: 79
End: 2024-01-22
Payer: COMMERCIAL

## 2024-01-22 DIAGNOSIS — S82.045D CLOSED NONDISPLACED COMMINUTED FRACTURE OF LEFT PATELLA WITH ROUTINE HEALING, SUBSEQUENT ENCOUNTER: Primary | ICD-10-CM

## 2024-01-22 PROCEDURE — 97112 NEUROMUSCULAR REEDUCATION: CPT

## 2024-01-22 PROCEDURE — 97140 MANUAL THERAPY 1/> REGIONS: CPT

## 2024-01-22 PROCEDURE — 97110 THERAPEUTIC EXERCISES: CPT

## 2024-01-22 NOTE — PROGRESS NOTES
"Daily Note     Today's date: 2024  Patient name: Natali Hernandez  : 1945  MRN: [id number]  Referring provider: Juan José Landry DO  Dx:   Encounter Diagnosis     ICD-10-CM    1. Closed nondisplaced comminuted fracture of left patella with routine healing, subsequent encounter  S82.045D                       Start Time: 1012  Stop Time: 1055  Total time in clinic (min): 43 minutes    Subjective: Patient reports her knee is good. She denies any pain, issues, or complaints. Patient remains compliant with knee brace.  She reports her walking has been \"going fine\".       Objective: See treatment diary below      Assessment: patient continues to tolerate exercises and rom well with no pain or discomfort present with them. Continue ambulation with knee brace unlocked per referring provider order.  She continues to tolerate this and gradual progressions well. Patient would benefit from continued PT in order to maximize function post injury.       Plan: Continue per plan of care.       Precautions:   Past Medical History:   Diagnosis Date    A-fib (HCC)     History of loop recorder     Multiple falls     Pacemaker     Thyroid disease          Eval/Re-Eval POC Expires Auth #/ Referral # Total Visits Start Date Expiration Date Extension Info Visits Limitation   12/20  3/13   no auth- mc and secondary           bomn and secondary bomn                                                1 2 3 4 5 6   12/20 12/27 12/29 1/3 1/5 1/8   7 8 9 10 11 12   1/10 1/15 1/17 1/22     13 14 15 16 17 18           19 20 21 22 23 24           25 26 27 28 29 30             \"Evaluate and Treat     Status post left patella fracture sustained on 2023   Patient's current nightly weightbearing as tolerated with the knee locked in extension.  Patient will continue with weightbearing locked in extension until 8 weeks out from the date of injury and then can progress to range of motion and ambulation with the knee brace unlocked.  She " "will continue ambulating with the knee brace unlocked and in place for 4 weeks after initiation and then can discontinue the brace.  Continue increasing range of motion settings by 10 degrees of flexion each week.  Patient currently set to 0 to 90 degrees\"     Manuals 1/22            Knee prom  TE                                                   Neuro Re-Ed             Quad set 2x10 3\"             SLR  2x10             Standing hip abd 2x10 ea.            Weight shifting  Alternating mini march  20x            Standing hip ext             Hamstring curls  2x10                                       Ther Ex             Heel slide prom  2x10            Sitting knee flexion  2x10            Calf raise 2x10            Seated hss  3x20\"                                                                 Ther Activity             Reassessment of Function                          Gait Training                                       Modalities                                            "

## 2024-01-24 ENCOUNTER — OFFICE VISIT (OUTPATIENT)
Dept: PHYSICAL THERAPY | Facility: MEDICAL CENTER | Age: 79
End: 2024-01-24
Payer: COMMERCIAL

## 2024-01-24 DIAGNOSIS — S82.045D CLOSED NONDISPLACED COMMINUTED FRACTURE OF LEFT PATELLA WITH ROUTINE HEALING, SUBSEQUENT ENCOUNTER: Primary | ICD-10-CM

## 2024-01-24 PROCEDURE — 97140 MANUAL THERAPY 1/> REGIONS: CPT

## 2024-01-24 PROCEDURE — 97112 NEUROMUSCULAR REEDUCATION: CPT

## 2024-01-24 PROCEDURE — 97110 THERAPEUTIC EXERCISES: CPT

## 2024-01-24 NOTE — PROGRESS NOTES
"Daily Note     Today's date: 2024  Patient name: Natali Hernandez  : 1945  MRN: [id number]  Referring provider: Juan José Landry DO  Dx:   Encounter Diagnosis     ICD-10-CM    1. Closed nondisplaced comminuted fracture of left patella with routine healing, subsequent encounter  S82.045D               Start Time: 1015  Stop Time: 1055  Total time in clinic (min): 40 minutes    Subjective: Patient reports \"it (her knee) feels good\". She offers no pain or complaints. Patient has her follow up with her referring provider next Monday.      Objective: See treatment diary below      Assessment: Patient tolerates therapy, exercises, and rom well today. Continued with exercise program as charted below. Fatigue noted near the end of the session without any pain increases. Patient would benefit from continued PT in order to maximize function post injury.   Anticipate DC knee brace next session in accordance with referring providers orders as patient will be over four week heide since initiating unlocking of brace for ambulation. Patient has shown good steadiness and no instances of instability.     Plan: Continue per plan of care.       Precautions:   Past Medical History:   Diagnosis Date    A-fib (HCC)     History of loop recorder     Multiple falls     Pacemaker     Thyroid disease          Eval/Re-Eval POC Expires Auth #/ Referral # Total Visits Start Date Expiration Date Extension Info Visits Limitation   12/20  3/13   no auth- mc and secondary           bomn and secondary bomn                                                1 2 3 4 5 6   12/20 12/27 12/29 1/3 1/5 1/8   7 8 9 10 11 12   1/10 1/15 1/17 1/22 1/24    13 14 15 16 17 18           19 20 21 22 23 24           25 26 27 28 29 30             \"Evaluate and Treat     Status post left patella fracture sustained on 2023   Patient's current nightly weightbearing as tolerated with the knee locked in extension.  Patient will continue with weightbearing " "locked in extension until 8 weeks out from the date of injury and then can progress to range of motion and ambulation with the knee brace unlocked.  She will continue ambulating with the knee brace unlocked and in place for 4 weeks after initiation and then can discontinue the brace.  Continue increasing range of motion settings by 10 degrees of flexion each week.  Patient currently set to 0 to 90 degrees\"     Manuals 1/22 1/24           Knee prom  TE TE                                                  Neuro Re-Ed             Quad set 2x10 3\"  2x10 3\"            SLR  2x10  2x10           Standing hip abd 2x10 ea. 2x10 ea.           Weight shifting  Alternating mini march  20x Alternating mini march  20x           Standing hip ext             Hamstring curls  2x10  2x10                                     Ther Ex             Heel slide prom  2x10 2x10           Sitting knee flexion  2x10 2x10           Calf raise 2x10 2x10           Seated hss  3x20\"  3x20\"                                                                Ther Activity             Reassessment of Function                          Gait Training                                       Modalities                                            "

## 2024-01-29 ENCOUNTER — OFFICE VISIT (OUTPATIENT)
Dept: OBGYN CLINIC | Facility: CLINIC | Age: 79
End: 2024-01-29

## 2024-01-29 ENCOUNTER — APPOINTMENT (OUTPATIENT)
Dept: RADIOLOGY | Facility: AMBULARY SURGERY CENTER | Age: 79
End: 2024-01-29
Attending: STUDENT IN AN ORGANIZED HEALTH CARE EDUCATION/TRAINING PROGRAM
Payer: COMMERCIAL

## 2024-01-29 VITALS — BODY MASS INDEX: 21.68 KG/M2 | WEIGHT: 127 LBS | HEIGHT: 64 IN

## 2024-01-29 DIAGNOSIS — S82.045A CLOSED NONDISPLACED COMMINUTED FRACTURE OF LEFT PATELLA, INITIAL ENCOUNTER: Primary | ICD-10-CM

## 2024-01-29 DIAGNOSIS — S82.045A CLOSED NONDISPLACED COMMINUTED FRACTURE OF LEFT PATELLA, INITIAL ENCOUNTER: ICD-10-CM

## 2024-01-29 PROCEDURE — 99024 POSTOP FOLLOW-UP VISIT: CPT | Performed by: STUDENT IN AN ORGANIZED HEALTH CARE EDUCATION/TRAINING PROGRAM

## 2024-01-29 PROCEDURE — 73560 X-RAY EXAM OF KNEE 1 OR 2: CPT

## 2024-01-29 NOTE — PROGRESS NOTES
Orthopaedics Office Visit -new patient Visit    ASSESSMENT/PLAN:    Assessment:   Left patella nondisplaced horizontal fracture sustained 11/1/2023    Plan:   Patient currently 12 weeks from injury date.  X-rays today demonstrate healing of the fracture site.  The fracture line is still visible but there is bridging callus anteriorly and posteriorly on the patella.  Does not move since the date of injury.  Has really stored all range of motion and 5 out of 5 muscle strength of the lower extremity  May discontinue use of TROM brace at this time  Patient may continue physical therapy for gait training, lower extremity strengthening as desired or until discharged from their care  Patient uses Xarelto longstanding  Patient encouraged to continue to work with PCP for osteoporosis treatment  Follow up on an as-needed basis       To Do Next Visit:  Repeat x-rays of left knee    _____________________________________________________  CHIEF COMPLAINT:  Chief Complaint   Patient presents with    Left Knee - Follow-up         SUBJECTIVE:  Natali Hernandez is a 78 y.o. female who presents for initial evaluation of left knee s/p fall 11/1/2023 with history of left total hip arthroplasty 7 months ago in [place].  She states she has fallen severe times due to medication for treatment of Afib, Dilitiazem.  Today she complains of decreasing left anterior knee pain.  She has been complaint with left LE immobilizer brace.  She has been weight bearing with use of SPC.  Patient denies any numbness or paresthesias in the left lower extremity.  Denies any previous knee injury.    Interval history 12/18/2023:  The patient presents about 6 weeks s/p left horizontal patella fracture sustained 11/1/2023.  She is doing well.  Today she has no left knee pain complaints.  She has been complaint with T-rom brace in locked position for ambulation yet unlocked for rest.  She does use SPC.  She continues physical therapy with visiting physical therapist  with benefit.  She denies medications for this issue.  She denies numbness or tingling.      Interval history 1/29/2024  Natali presents 12 weeks status post left horizontal patella fracture sustained 11/1/2023. She is doing well overall and presents with no complaints today. She is still completing Physical Therapy at this time. She has continued to wear her TROM brace full time, even for sleep, from 0-120 degrees. She does not take any medication for pain. She uses a cane for long distance ambulation.    PAST MEDICAL HISTORY:  Past Medical History:   Diagnosis Date    A-fib (HCC)     History of loop recorder     Multiple falls     Pacemaker     Thyroid disease        PAST SURGICAL HISTORY:  Past Surgical History:   Procedure Laterality Date    APPENDECTOMY      BILATERAL HIP ARTHROSCOPY Right     CARDIAC LOOP RECORDER      CARDIAC PACEMAKER PLACEMENT         FAMILY HISTORY:  History reviewed. No pertinent family history.    SOCIAL HISTORY:  Social History     Tobacco Use    Smoking status: Unknown       MEDICATIONS:    Current Outpatient Medications:     acetaminophen (TYLENOL) 325 mg tablet, Take 2 tablets (650 mg total) by mouth every 6 (six) hours as needed for mild pain, Disp: , Rfl: 0    carvedilol (COREG) 3.125 mg tablet, Take 3.125 mg by mouth 2 (two) times a day with meals, Disp: , Rfl:     diltiazem (DILACOR XR) 240 MG 24 hr capsule, Take 240 mg by mouth daily, Disp: , Rfl:     fexofenadine (ALLEGRA) 180 MG tablet, Take 180 mg by mouth daily, Disp: , Rfl:     methocarbamol (ROBAXIN) 500 mg tablet, Take 1 tablet (500 mg total) by mouth 4 (four) times a day, Disp: 65 tablet, Rfl: 0    naproxen (EC NAPROSYN) 375 MG TBEC, Take 1 tablet (375 mg total) by mouth 2 (two) times a day with meals, Disp: 60 tablet, Rfl: 0    rivaroxaban (Xarelto) 20 mg tablet, Take 20 mg by mouth, Disp: , Rfl:     thyroid (ARMOUR) 60 MG tablet, Take 60 mg by mouth daily, Disp: , Rfl:     ALLERGIES:  Allergies   Allergen Reactions     "Other Other (See Comments)     Any \"steroids\" including prednisone and eye steroids; causes muscle weakness, joint pain        REVIEW OF SYSTEMS:  MSK: Left knee pain  Neuro: No numbness or paresthesia  Pertinent items are otherwise noted in HPI.  A comprehensive review of systems was otherwise negative.    LABS:  HgA1c: No results found for: \"HGBA1C\"  BMP:   Lab Results   Component Value Date    GLUCOSE 98 11/01/2023    CO2 27 11/01/2023     CBC: No components found for: \"CBC\"    _____________________________________________________  PHYSICAL EXAMINATION:  Vital signs: Ht 5' 4\" (1.626 m)   Wt 57.6 kg (127 lb)   BMI 21.80 kg/m²   General: No acute distress, awake and alert  Psychiatric: Mood and affect appear appropriate  HEENT: Trachea Midline, No torticollis, no apparent facial trauma  Cardiovascular: No audible murmurs; Extremities appear perfused  Pulmonary: No audible wheezing or stridor  Skin: No open lesions; see further details (if any) below    MUSCULOSKELETAL EXAMINATION:  Extremities:    Left knee:  The left knee was exposed inspected.  Skin is intact without erythema or induration.  She has no tenderness to palpation over the anterior aspect of the patella with no palpable gap over the quadriceps tendon, patella, patellar tendon.  The patient is able to achieve active straight leg raise without extensor lag.  5 out of 5 strength against resistance.  The patient's nontender over the medial lateral joint lines.  Patient is able to be flexed to 120 degrees in the office.  Denies pain with this range of motion.  Returning quadricep strength.  Patient's sensation is intact to light touch in superficial peroneal, deep peroneal, sural, saphenous, plantar nerve distributions.  Tibialis anterior, extensor hallux longus, gastrocnemius muscles are intact.  Limb is well-perfused.  Compartment soft compressible      _____________________________________________________  STUDIES REVIEWED:  I personally reviewed the " images and interpretation is as follows:  Left knee x-ray: X-rays of the left knee demonstrate a horizontally oriented nondisplaced patella fracture with no increase in displacement from last visit's views.  Fracture line still visualized.  No gapping at the fracture site from previous radiographs.  Some interval fracture healing noted anteriorly and posteriorly on the patella    PROCEDURES PERFORMED:  Procedures    Scribe Attestation      I,:  Urmila Guevara am acting as a scribe while in the presence of the attending physician.:       I,:  Juan José Landry DO personally performed the services described in this documentation    as scribed in my presence.:

## 2024-01-31 ENCOUNTER — APPOINTMENT (OUTPATIENT)
Dept: PHYSICAL THERAPY | Facility: MEDICAL CENTER | Age: 79
End: 2024-01-31
Payer: COMMERCIAL

## 2024-02-02 ENCOUNTER — OFFICE VISIT (OUTPATIENT)
Dept: PHYSICAL THERAPY | Facility: MEDICAL CENTER | Age: 79
End: 2024-02-02
Payer: COMMERCIAL

## 2024-02-02 DIAGNOSIS — S82.045D CLOSED NONDISPLACED COMMINUTED FRACTURE OF LEFT PATELLA WITH ROUTINE HEALING, SUBSEQUENT ENCOUNTER: Primary | ICD-10-CM

## 2024-02-02 PROCEDURE — 97110 THERAPEUTIC EXERCISES: CPT

## 2024-02-02 PROCEDURE — 97112 NEUROMUSCULAR REEDUCATION: CPT

## 2024-02-02 NOTE — PROGRESS NOTES
"Daily Note     Today's date: 2024  Patient name: Natali Hernandez  : 1945  MRN: [id number]  Referring provider: Juan José Landry DO  Dx:   Encounter Diagnosis     ICD-10-CM    1. Closed nondisplaced comminuted fracture of left patella with routine healing, subsequent encounter  S82.045D                 Start Time: 1015  Stop Time: 1054  Total time in clinic (min): 39 minutes    Subjective: Patient reports no pain today. She saw her doctor the other day for a follow up which she reports went well. She reports her brace was discharged. No reports no issues or complaints out of the brace. She reports she wants to work on strengthening.     Objective: See treatment diary below      Assessment:  Patient tolerated therapy and exercises well today. Progressed patient to light LE strengthening exercises. Patient tolerated progressions of program well. Fatigue noted on occasion but no pain. Patient would benefit from continued PT in order to maximize function post injury.       Plan: Continue per plan of care.       Precautions:   Past Medical History:   Diagnosis Date    A-fib (HCC)     History of loop recorder     Multiple falls     Pacemaker     Thyroid disease          Eval/Re-Eval POC Expires Auth #/ Referral # Total Visits Start Date Expiration Date Extension Info Visits Limitation   12/20  3/13   no auth- mc and secondary           bomn and secondary bomn                                                1 2 3 4 5 6   12/20 12/27 12/29 1/3 1/5 1/8   7 8 9 10 11 12   1/10 1/15 1/17 1/22 1/24 2/   13 14 15 16 17 18           19 20 21 22 23 24           25 26 27 28 29 30               Manuals           Knee prom  TE TE Dc Prom wnl                                                 Neuro Re-Ed             Quad set 2x10 3\"  2x10 3\"            SLR  2x10  2x10 2x10 1#          Standing hip abd 2x10 ea. 2x10 ea. 2x10 1#          Weight shifting  Alternating mini march  20x Alternating mini march  20x     " "      Hamstring curls  2x10  2x10 2x10 1#          minisquat   1x10                       Ther Ex             Heel slide 2x10 2x10 2x10           Sitting knee flexion  2x10 2x10 2x10          Calf raise 2x10 2x10 2x10          Seated hss  3x20\"  3x20\"  3x20\"          Step up    Fwd 2x5 1 UE 6\" step          Lateral band walk   Rtb 5 cycles at table                                    Ther Activity                                       Gait Training                                       Modalities                                            "

## 2024-02-07 ENCOUNTER — OFFICE VISIT (OUTPATIENT)
Dept: PHYSICAL THERAPY | Facility: MEDICAL CENTER | Age: 79
End: 2024-02-07
Payer: COMMERCIAL

## 2024-02-07 DIAGNOSIS — S82.045D CLOSED NONDISPLACED COMMINUTED FRACTURE OF LEFT PATELLA WITH ROUTINE HEALING, SUBSEQUENT ENCOUNTER: Primary | ICD-10-CM

## 2024-02-07 PROCEDURE — 97110 THERAPEUTIC EXERCISES: CPT

## 2024-02-07 PROCEDURE — 97112 NEUROMUSCULAR REEDUCATION: CPT

## 2024-02-07 NOTE — PROGRESS NOTES
"Daily Note     Today's date: 2024  Patient name: Natali Hernandez  : 1945  MRN: [id number]  Referring provider: Juan José Landry DO  Dx:   Encounter Diagnosis     ICD-10-CM    1. Closed nondisplaced comminuted fracture of left patella with routine healing, subsequent encounter  S82.045D                   Start Time: 1014  Stop Time: 1055  Total time in clinic (min): 41 minutes    Subjective: Patient reports she felt \"pretty good\" after last session. She reports feeling an occasional ache in her knee. She reports no pain present at the start of the session.       Objective: See treatment diary below      Assessment:  Patient tolerated therapy and exercises well today. Added static balance into patient program for proprioception and stability. Mild sway present but no UE support needed. Patient has good tolerance and performance with exercises overall. Patient would benefit from continued PT in order to maximize function post injury. Fatigue noted post session.      Plan: Continue per plan of care.       Precautions:   Past Medical History:   Diagnosis Date    A-fib (HCC)     History of loop recorder     Multiple falls     Pacemaker     Thyroid disease          Eval/Re-Eval POC Expires Auth #/ Referral # Total Visits Start Date Expiration Date Extension Info Visits Limitation   12/20  3/13   no auth- mc and secondary           bomn and secondary bomn                                                1 2 3 4 5 6   12/20 12/27 12/29 1/3 1/5 1/8   7 8 9 10 11 12   1/10 1/15 1/17 1/22 1/24 2/2   13 14 15 16 17 18           19 20 21 22 23 24           25 26 27 28 29 30               Manuals          Knee prom  TE TE Dc Prom wnl                                                 Neuro Re-Ed             Quad set 2x10 3\"  2x10 3\"            SLR  2x10  2x10 2x10 1# 2x10 1#         Standing hip abd 2x10 ea. 2x10 ea. 2x10 1# 2x10 1#         Weight shifting  Alternating mini x Alternating mini " "march 20x           Hamstring curls  2x10  2x10 2x10 1# 2x10 1#         minisquat   1x10 2x10         Balance    FT firm 3x30\"          Ther Ex             Heel slide 2x10 2x10 2x10  2x10         Sitting knee flexion  2x10 2x10 2x10 2x10          Calf raise 2x10 2x10 2x10 2x10         Seated hss  3x20\"  3x20\"  3x20\" 3x20\"         Step up    Fwd 2x5 1 UE 6\" step Fwd 2x5 1 UE 6\" step         Lateral band walk   Rtb 5 cycles at table Rtb 5 cycles at table                                   Ther Activity                                       Gait Training                                       Modalities                                            "

## 2024-02-09 ENCOUNTER — OFFICE VISIT (OUTPATIENT)
Dept: PHYSICAL THERAPY | Facility: MEDICAL CENTER | Age: 79
End: 2024-02-09
Payer: COMMERCIAL

## 2024-02-09 DIAGNOSIS — S82.045D CLOSED NONDISPLACED COMMINUTED FRACTURE OF LEFT PATELLA WITH ROUTINE HEALING, SUBSEQUENT ENCOUNTER: Primary | ICD-10-CM

## 2024-02-09 PROCEDURE — 97112 NEUROMUSCULAR REEDUCATION: CPT

## 2024-02-09 PROCEDURE — 97110 THERAPEUTIC EXERCISES: CPT

## 2024-02-09 NOTE — PROGRESS NOTES
"Daily Note     Today's date: 2024  Patient name: Natali Hernandez  : 1945  MRN: [id number]  Referring provider: Juan José Landry DO  Dx:   Encounter Diagnosis     ICD-10-CM    1. Closed nondisplaced comminuted fracture of left patella with routine healing, subsequent encounter  S82.045D                     Start Time: 1100  Stop Time: 1130  Total time in clinic (min): 30 minutes    Subjective: Patient reports her knee is \"good\". She reports no pain present. She reports soreness in her hips following last session but it was fine by the next day.       Objective: See treatment diary below      Assessment:  Patient tolerated therapy and exercises well today. Progressed reps of step up exercises. Cues provided for form of minisquats. Patient has good tolerance to exercises. Patient would benefit from continued PT in order to maximize function post injury.       Plan: Continue per plan of care.       Precautions:   Past Medical History:   Diagnosis Date    A-fib (HCC)     History of loop recorder     Multiple falls     Pacemaker     Thyroid disease          Eval/Re-Eval POC Expires Auth #/ Referral # Total Visits Start Date Expiration Date Extension Info Visits Limitation   12/20  3/13   no auth- mc and secondary           bomn and secondary bomn                                                1 2 3 4 5 6   12/20 12/27 12/29 1/3 1/5 1/8   7 8 9 10 11 12   1/10 1/15 1/17 1/22 1/24 2/2   13 14 15 16 17 18          19 20 21 22 23 24           25 26 27 28 29 30               Manuals  2        Knee prom  TE TE Dc Prom wnl                                                 Neuro Re-Ed             Quad set 2x10 3\"  2x10 3\"            SLR  2x10  2x10 2x10 1# 2x10 1# 2x10 1#        Standing hip abd 2x10 ea. 2x10 ea. 2x10 1# 2x10 1# 2x10 1#        Weight shifting  Alternating mini march  20x Alternating mini march  20x           Hamstring curls  2x10  2x10 2x10 1# 2x10 1# 2x10 1#      " "  minisquat   1x10 2x10 2x10        Balance    FT firm 3x30\"  FT firm 3x30\"         Ther Ex             Heel slide 2x10 2x10 2x10  2x10 2x10        Sitting knee flexion  2x10 2x10 2x10 2x10          Calf raise 2x10 2x10 2x10 2x10 2x10        Seated hss  3x20\"  3x20\"  3x20\" 3x20\"         Step up    Fwd 2x5 1 UE 6\" step Fwd 2x5 1 UE 6\" step Fwd 3x5 1 UE 6\" step        Lateral band walk   Rtb 5 cycles at table Rtb 5 cycles at table Rtb 5 cycles at table                                  Ther Activity                                       Gait Training                                       Modalities                                            "

## 2024-02-14 ENCOUNTER — OFFICE VISIT (OUTPATIENT)
Dept: PHYSICAL THERAPY | Facility: MEDICAL CENTER | Age: 79
End: 2024-02-14
Payer: COMMERCIAL

## 2024-02-14 DIAGNOSIS — S82.045D CLOSED NONDISPLACED COMMINUTED FRACTURE OF LEFT PATELLA WITH ROUTINE HEALING, SUBSEQUENT ENCOUNTER: Primary | ICD-10-CM

## 2024-02-14 PROCEDURE — 97112 NEUROMUSCULAR REEDUCATION: CPT

## 2024-02-14 PROCEDURE — 97110 THERAPEUTIC EXERCISES: CPT

## 2024-02-14 NOTE — PROGRESS NOTES
"Daily Note     Today's date: 2024  Patient name: Natali Hernandez  : 1945  MRN: [id number]  Referring provider: Juan José Landry DO  Dx:   Encounter Diagnosis     ICD-10-CM    1. Closed nondisplaced comminuted fracture of left patella with routine healing, subsequent encounter  S82.045D             Start Time: 1057  Stop Time: 1135  Total time in clinic (min): 38 minutes    Subjective: Patient reports  she is doing \"good\" and \"better\". She denies any pain or complaints with her knee.       Objective: See treatment diary below      Assessment:  Patient tolerated therapy and exercises well today.  Patient would benefit from continued PT in order to maximize function post injury. Continued strengthening. Added STS activity today.  Fatigue is noted post session.       Plan: Continue per plan of care.       Precautions:   Past Medical History:   Diagnosis Date    A-fib (HCC)     History of loop recorder     Multiple falls     Pacemaker     Thyroid disease          Eval/Re-Eval POC Expires Auth #/ Referral # Total Visits Start Date Expiration Date Extension Info Visits Limitation   12/20  3/13   no auth- mc and secondary           bomn and secondary bomn                                                1 2 3 4 5 6   12/20 12/27 12/29 1/3 1/5 1/8   7 8 9 10 11 12   1/10 1/15 1/17 1/22 1/24 2/2   13 14 15 16 17 18         19 20 21 22 23 24           25 26 27 28 29 30               Manuals        Knee prom  TE TE Dc Prom wnl                                                 Neuro Re-Ed             Quad set 2x10 3\"  2x10 3\"            SLR  2x10  2x10 2x10 1# 2x10 1# 2x10 1# 2x10 1#       Standing hip abd 2x10 ea. 2x10 ea. 2x10 1# 2x10 1# 2x10 1# 2x10 1#       Weight shifting  Alternating mini march  20x Alternating mini march  20x           Hamstring curls  2x10  2x10 2x10 1# 2x10 1# 2x10 1# 2x10 1#       minisquat   1x10 2x10 2x10 2x10       Balance    FT firm 3x30\"  FT firm " "3x30\"  FT firm 3x30\"        STS      2x10 foam pad on chair        Ther Ex             Heel slide 2x10 2x10 2x10  2x10 2x10 2x10       Sitting knee flexion  2x10 2x10 2x10 2x10          Calf raise 2x10 2x10 2x10 2x10 2x10 2x10       Seated hss  3x20\"  3x20\"  3x20\" 3x20\"         Step up    Fwd 2x5 1 UE 6\" step Fwd 2x5 1 UE 6\" step Fwd 3x5 1 UE 6\" step Fwd 4x5 1 UE 6\" step       Lateral band walk   Rtb 5 cycles at table Rtb 5 cycles at table Rtb 5 cycles at table Rtb 5 cycles at table                                 Ther Activity                                       Gait Training                                       Modalities                                            "

## 2024-02-16 ENCOUNTER — APPOINTMENT (OUTPATIENT)
Dept: PHYSICAL THERAPY | Facility: MEDICAL CENTER | Age: 79
End: 2024-02-16
Payer: COMMERCIAL

## 2024-02-21 ENCOUNTER — OFFICE VISIT (OUTPATIENT)
Dept: PHYSICAL THERAPY | Facility: MEDICAL CENTER | Age: 79
End: 2024-02-21
Payer: COMMERCIAL

## 2024-02-21 DIAGNOSIS — S82.045D CLOSED NONDISPLACED COMMINUTED FRACTURE OF LEFT PATELLA WITH ROUTINE HEALING, SUBSEQUENT ENCOUNTER: Primary | ICD-10-CM

## 2024-02-21 PROCEDURE — 97110 THERAPEUTIC EXERCISES: CPT

## 2024-02-21 PROCEDURE — 97112 NEUROMUSCULAR REEDUCATION: CPT

## 2024-02-21 NOTE — PROGRESS NOTES
PT ReEvaluation and PT Discharge    Today's date: 2024  Patient name: Natali Hernandez  : 1945  MRN: [id number]  Referring provider: Juan José Landry DO  Dx:   Encounter Diagnosis     ICD-10-CM    1. Closed nondisplaced comminuted fracture of left patella with routine healing, subsequent encounter  S82.045D               Start Time: 1015  Stop Time: 1055  Total time in clinic (min): 40 minutes    Assessment  Assessment details: ReEval  Patient is a 78 year old female reporting to therapy with the referring diagnosis of  Closed nondisplaced comminuted fracture of left patella with routine healing, subsequent encounter. Date of injury was 23. Patient has completed sixteen visits to date since initial evaluation. Patient has progressed with therapy and notes no pain or limitations at this time. Knee Rom is WNL. Patient is reporting compliance with exercises and displays good form with them today. Patient reports she us back to completing all her adls. She has met short and long term therapy goals. Patient to be discharged to ind hep at this time. She is in agreement with this plan. Updated hep handout provided and reviewed with her.            Impairments: abnormal gait, abnormal or restricted ROM, abnormal movement, activity intolerance, impaired balance, impaired physical strength and lacks appropriate home exercise program    Symptom irritability: lowUnderstanding of Dx/Px/POC: good   Prognosis: good    Goals  STG  -Patient will be IND with basic level HEP within 4 weeks in order to make improvements at home MET  - Patient will have wnl quad set within 4 weeks MET  - Patient will have improving knee rom per referring providers guidelines within 4 weeks MET   - patient gait will be progressed within referring providers guidelines within 4 weeks MET    LTG  -Patient will be IND with HEP within 12 weeks in order to make improvements at home MET  - Patient will wfl gait mechanics within 12 weeks-met  -  "patient will have wfl knee rom within 10 weeks -met  - patient will return to adls within 12 weeks MET        Plan  Plan details: Discharge to [facility].     Patient would benefit from: PT eval and skilled physical therapy  Referral necessary: No  Planned modality interventions: cryotherapy and thermotherapy: hydrocollator packs  Planned therapy interventions: joint mobilization, IASTM, manual therapy, massage, muscle pump exercises, neuromuscular re-education, patient education, postural training, flexibility, functional ROM exercises, gait training, graded activity, strengthening, stretching, therapeutic activities, therapeutic exercise, therapeutic training, transfer training, graded exercise, graded motor, home exercise program, body mechanics training, abdominal trunk stabilization, ADL retraining, balance/weight bearing training and balance  Frequency: 2x week  Duration in weeks: 12  Plan of Care beginning date: 2023  Plan of Care expiration date: 3/13/2024  Treatment plan discussed with: patient        Subjective Evaluation    History of Present Illness  Mechanism of injury: Reevaluation     Patient reports \"were doing great\". She reports progress since starting therapy. She denies any recent pain. She reports compliance with her exercises and notes good tolerance to them. Patient noting no limitations with adls at this time. She reports she is comfortable with continuing exercises at home.   Patient Goals  Patient goals for therapy: increased strength, independence with ADLs/IADLs, return to sport/leisure activities, increased motion, improved balance and decreased pain    Pain  Current pain ratin  At best pain ratin  At worst pain ratin  Progression: improved    Treatments  No previous or current treatments        Objective     Tenderness     Additional Tenderness Details  No ttp present     Active Range of Motion   Left Knee   Flexion: 120 degrees     Passive Range of Motion   Left Knee " "  Flexion: 122 degrees   Extension: 0 degrees     Strength/Myotome Testing     Left Hip   Planes of Motion   Flexion: 5  Abduction: 4+    Right Hip   Planes of Motion   Flexion: 5  Abduction: 4+    Left Knee   Flexion: 5  Extension: 5  Quadriceps contraction: good    Right Knee   Quadriceps contraction: good    Additional Strength Details  5/5 strength with SLR               Precautions:   Past Medical History:   Diagnosis Date    A-fib (HCC)     History of loop recorder     Multiple falls     Pacemaker     Thyroid disease        Eval/Re-Eval POC Expires Auth #/ Referral # Total Visits Start Date Expiration Date Extension Info Visits Limitation   12/20  3/13   no auth- mc and secondary           bomn and secondary bomn    1/17                2/21 3/13                           1 2 3 4 5 6   12/20 12/27 12/29 1/3 1/5 1/8   7 8 9 10 11 12   1/10 1/15 1/17 1/22 1/24 2/2   13 14 15 16 17 18   2/7 2/9 2/14 2/21     19 20 21 22 23 24           25 26 27 28 29 30               Manuals 1/22 1/24 2/2 2/7 2/9 2/14 2/21      Knee prom  TE TE Dc Prom wnl                                                 Neuro Re-Ed             Quad set 2x10 3\"  2x10 3\"            SLR  2x10  2x10 2x10 1# 2x10 1# 2x10 1# 2x10 1# 2x10 1#      Standing hip abd 2x10 ea. 2x10 ea. 2x10 1# 2x10 1# 2x10 1# 2x10 1# 2x10 1#      Weight shifting  Alternating mini march  20x Alternating mini march  20x           Hamstring curls  2x10  2x10 2x10 1# 2x10 1# 2x10 1# 2x10 1# 2x10 1#      minisquat   1x10 2x10 2x10 2x10 2x10      Balance    FT firm 3x30\"  FT firm 3x30\"  FT firm 3x30\"  FT firm 3x30\"       STS      2x10 foam pad on chair  2x10       Ther Ex             Heel slide 2x10 2x10 2x10  2x10 2x10 2x10 2x10      Sitting knee flexion  2x10 2x10 2x10 2x10          Calf raise 2x10 2x10 2x10 2x10 2x10 2x10 2x10      Seated hss  3x20\"  3x20\"  3x20\" 3x20\"         Step up    Fwd 2x5 1 UE 6\" step Fwd 2x5 1 UE 6\" step Fwd 3x5 1 UE 6\" step Fwd 4x5 1 UE 6\" step Fwd 2x10 " "6\" step      Lateral band walk   Rtb 5 cycles at table Rtb 5 cycles at table Rtb 5 cycles at table Rtb 5 cycles at table Rtb 5 cycles at table      RE       TE                   Ther Activity                                       Gait Training                                       Modalities                                           "

## 2024-03-08 ENCOUNTER — OFFICE VISIT (OUTPATIENT)
Dept: CARDIOLOGY CLINIC | Facility: MEDICAL CENTER | Age: 79
End: 2024-03-08
Payer: COMMERCIAL

## 2024-03-08 VITALS
HEIGHT: 64 IN | OXYGEN SATURATION: 99 % | WEIGHT: 134 LBS | DIASTOLIC BLOOD PRESSURE: 70 MMHG | BODY MASS INDEX: 22.88 KG/M2 | SYSTOLIC BLOOD PRESSURE: 142 MMHG | HEART RATE: 82 BPM

## 2024-03-08 DIAGNOSIS — R42 DIZZINESS: ICD-10-CM

## 2024-03-08 DIAGNOSIS — E03.9 HYPOTHYROIDISM, UNSPECIFIED TYPE: ICD-10-CM

## 2024-03-08 DIAGNOSIS — Z95.0 CARDIAC PACEMAKER IN SITU: ICD-10-CM

## 2024-03-08 DIAGNOSIS — I48.0 PAROXYSMAL ATRIAL FIBRILLATION (HCC): Primary | ICD-10-CM

## 2024-03-08 PROCEDURE — 99204 OFFICE O/P NEW MOD 45 MIN: CPT | Performed by: INTERNAL MEDICINE

## 2024-03-08 PROCEDURE — 93000 ELECTROCARDIOGRAM COMPLETE: CPT | Performed by: INTERNAL MEDICINE

## 2024-03-08 NOTE — PROGRESS NOTES
[facility] [place]   [address]  [address]  [facility] [address]                                            Cardiology Office Consult  Natali Hernandez, 78 y.o. female  YOB: 1945  MRN: [id number] Encounter: [id number]      PCP - Waqar Hart MD  Referring Provider - Self, Referral    Chief Complaint   Patient presents with   • New Patient Visit     Patient needs Cardiologist. Has A-Fib and Pacemaker   • Atrial Fibrillation       Assessment  Cardiac dual chamber pacemaker in-situ ([employer])  Implanted: 4/10/23 in [place]  4/14/23: tiny apical left penumothorax  Paroxysmal Atrial Fibrillation  Dizziness  Hypertension  Hypothyroidism  On armour thyroid    Plan  Cardiac dual chamber pacemaker in-situ ([employer]tronic)  Implanted 4/2023 in [place] for Afib/SSS  No apparent issues with pacemaker thereafter, but she has had 2 falls in the interim  Will get established with device clinic for periodic device checks    Paroxysmal Atrial Fibrillation, dizziness  Currently in sinus rhythm  Continue carvedilol 3.125 mg twice daily, Cardizem XR to 40 mg daily  She feels she has dizziness since starting on these medications, but both her blood pressure and heart rate are normal and she even has a pacemaker in place so I am not so sure that the medications are causing it  We will get device check and assess, if no significant A-fib then can consider discontinuing carvedilol  Monitor blood pressure  Continue Xarelto 20 mg daily  Obtain basic lab work including CMP, CBC, TSH  Check echocardiogram    Hypertension  Blood pressure on higher side, 142/70 but acceptable  Continue Cardizem XR to 40 mg daily, carvedilol 3.125 mg.    Results for orders placed or performed in visit on 03/08/24   POCT ECG    Impression    Normal sinus rhythm  Normal ECG       Orders Placed This Encounter   Procedures   • Comprehensive metabolic panel   • Lipid Panel with Direct LDL reflex   • CBC and differential   • TSH, 3rd  generation with Free T4 reflex   • POCT ECG   • Echo complete w/ contrast if indicated       Return in about 4 weeks (around 4/5/2024), or if symptoms worsen or fail to improve.      History of Present Illness   78 y.o. female comes in as a new patient for consultation and establishment of care.  She recently moved to this area from [place] and is now in the process of establishing cardiac care here.    She notes to me that she has had ongoing issues with atrial fibrillation since 2022 when she was in [place].    12/2022: cardiac monitor implanted to further evaluate this.    4/10/2023: pacemaker implant .  Mid-4/2023: Within a week after the pacemaker implant, apparently she fell and broke her left hip --> surgical left hip hemiarthroplasty ([place]).  She continued to otherwise do well from the A-fib standpoint but continued to have dizziness ongoing for several months.  She herself felt that it was related to the medication since it started around that time.    11/2023: Fall while walking her dog outside, despite having a pacemaker in place    Over the past 3 to 4 months she has continued to have constant ongoing dizziness, which is mostly unchanged. She states it is worse when she is outside walking.  She is unable to tell me any other clear triggers of her aggravating factors for this.  It is unchanged with change in position or from sit to stand, and in fact was reportedly present throughout the visit.  She denies any vertigo like sensation.    Historical Information   Past Medical History:   Diagnosis Date   • A-fib ([facility])    • History of loop recorder    • Multiple falls    • Pacemaker    • Thyroid disease      Past Surgical History:   Procedure Laterality Date   • APPENDECTOMY     • BILATERAL HIP ARTHROSCOPY Right    • CARDIAC LOOP RECORDER     • CARDIAC PACEMAKER PLACEMENT       History reviewed. No pertinent family history.  Current Outpatient Medications   Medication Instructions   • acetaminophen  "(TYLENOL) 650 mg, Oral, Every 6 hours PRN   • carvedilol (COREG) 3.125 mg, Oral, 2 times daily with meals   • diltiazem (DILACOR XR) 240 mg, Oral, Daily   • fexofenadine (ALLEGRA) 180 mg, Oral, Daily   • methocarbamol (ROBAXIN) 500 mg, Oral, 4 times daily   • naproxen (EC NAPROSYN) 375 mg, Oral, 2 times daily with meals   • rivaroxaban (XARELTO) 20 mg, Oral   • thyroid (ARMOUR) 60 mg, Oral, Daily      Allergies   Allergen Reactions   • Other Other (See Comments)     Any \"steroids\" including prednisone and eye steroids; causes muscle weakness, joint pain      Social History     Socioeconomic History   • Marital status:      Spouse name: None   • Number of children: None   • Years of education: None   • Highest education level: None   Occupational History   • None   Tobacco Use   • Smoking status: Unknown   • Smokeless tobacco: None   Substance and Sexual Activity   • Alcohol use: None   • Drug use: None   • Sexual activity: None   Other Topics Concern   • None   Social History Narrative   • None     Social Determinants of Health     Financial Resource Strain: Not on file   Food Insecurity: Not on file   Transportation Needs: Not on file   Physical Activity: Not on file   Stress: Not on file   Social Connections: Not on file   Intimate Partner Violence: Not on file   Housing Stability: Not on file        Review of Systems   All other systems reviewed and are negative.        Vitals:  Vitals:    03/08/24 1324   BP: 142/70   BP Location: Left arm   Patient Position: Sitting   Cuff Size: Adult   Pulse: 82   SpO2: 99%   Weight: 60.8 kg (134 lb)   Height: 5' 4\" (1.626 m)     BMI - Body mass index is 23 kg/m².  Wt Readings from Last 7 Encounters:   03/08/24 60.8 kg (134 lb)   01/29/24 57.6 kg (127 lb)   12/18/23 57.6 kg (127 lb)   11/13/23 58 kg (127 lb 13.9 oz)   11/01/23 58 kg (127 lb 13.9 oz)   11/01/23 56.7 kg (125 lb)       Physical Exam  Vitals and nursing note reviewed.   Constitutional:       General: She is " "not in acute distress.     Appearance: Normal appearance. She is well-developed. She is not ill-appearing.   HENT:      Head: Normocephalic and atraumatic.      Nose: No congestion.   Eyes:      General: No scleral icterus.     Conjunctiva/sclera: Conjunctivae normal.   Neck:      Vascular: No carotid bruit or JVD.   Cardiovascular:      Rate and Rhythm: Normal rate and regular rhythm.      Pulses: Normal pulses.      Heart sounds: Normal heart sounds. No murmur heard.     No friction rub. No gallop.   Pulmonary:      Effort: Pulmonary effort is normal. No respiratory distress.      Breath sounds: Normal breath sounds. No rales.   Abdominal:      General: There is no distension.      Palpations: Abdomen is soft.      Tenderness: There is no abdominal tenderness.   Musculoskeletal:         General: No swelling or tenderness.      Cervical back: Neck supple.      Right lower leg: No edema.      Left lower leg: No edema.   Skin:     General: Skin is warm.   Neurological:      General: No focal deficit present.      Mental Status: She is alert and oriented to person, place, and time. Mental status is at baseline.   Psychiatric:         Mood and Affect: Mood normal.         Behavior: Behavior normal.         Thought Content: Thought content normal.           Labs:  CBC:   Lab Results   Component Value Date    WBC 4.32 03/09/2024    RBC 3.53 (L) 03/09/2024    HGB 10.7 (L) 03/09/2024    HCT 34.0 (L) 03/09/2024    MCV 96 03/09/2024     03/09/2024    RDW 14.0 03/09/2024       CMP:   Lab Results   Component Value Date    K 4.8 03/09/2024     03/09/2024    CO2 26 03/09/2024    BUN 16 03/09/2024    CREATININE 0.99 03/09/2024    EGFR 54 03/09/2024    GLUCOSE 98 11/01/2023    CALCIUM 9.3 03/09/2024    AST 21 03/09/2024    ALT 13 03/09/2024    ALKPHOS 81 03/09/2024       Magnesium:  No results found for: \"MG\"    Lipid Profile:   Lab Results   Component Value Date    HDL 68 03/09/2024    TRIG 70 03/09/2024    LDLCALC " "156 (H) 03/09/2024       Thyroid Studies:   Lab Results   Component Value Date    QWF7KAODAEIS 1.691 03/09/2024       A1c:  No components found for: \"HGA1C\"    INR:  No results found for: \"INR\"5    Cardiac testing:   No results found for this or any previous visit.    No results found for this or any previous visit.    No results found for this or any previous visit.    No results found for this or any previous visit.      XR knee 1 or 2 vw left  Narrative: LEFT KNEE    INDICATION:   Nondisplaced comminuted fracture of left patella, initial encounter for closed fracture.     COMPARISON:  Comparison made with previous examination(s) dated (DX) 18-Dec-2023,(DX) 13-Nov-2023.    VIEWS:  XR KNEE 1 OR 2 VW LEFT     FINDINGS:  There is a nondisplaced transverse fracture of the upper third of the patella, unchanged in appearance and alignment from 12/18/2023. No significant callus formation is noted currently. Alignment is satisfactory however. Fracture lines are relatively   distinct.    There is no joint effusion.    No significant degenerative changes.    No lytic or blastic osseous lesion.    Soft tissues are unremarkable.  Impression: No significant change in appearance of the nondisplaced transverse fracture of the upper third of the patella. Fracture line is still relatively distinct. Alignment is satisfactory    Electronically signed: 01/29/2024 01:47 PM Vikash Bruner MD         "

## 2024-03-09 ENCOUNTER — APPOINTMENT (OUTPATIENT)
Dept: LAB | Facility: MEDICAL CENTER | Age: 79
End: 2024-03-09
Payer: COMMERCIAL

## 2024-03-09 DIAGNOSIS — R42 DIZZINESS: ICD-10-CM

## 2024-03-09 DIAGNOSIS — I48.0 PAROXYSMAL ATRIAL FIBRILLATION (HCC): ICD-10-CM

## 2024-03-09 DIAGNOSIS — E03.9 HYPOTHYROIDISM, UNSPECIFIED TYPE: ICD-10-CM

## 2024-03-09 LAB
ALBUMIN SERPL BCP-MCNC: 4.2 G/DL (ref 3.5–5)
ALP SERPL-CCNC: 81 U/L (ref 34–104)
ALT SERPL W P-5'-P-CCNC: 13 U/L (ref 7–52)
ANION GAP SERPL CALCULATED.3IONS-SCNC: 9 MMOL/L
AST SERPL W P-5'-P-CCNC: 21 U/L (ref 13–39)
BASOPHILS # BLD AUTO: 0.04 THOUSANDS/ÂΜL (ref 0–0.1)
BASOPHILS NFR BLD AUTO: 1 % (ref 0–1)
BILIRUB SERPL-MCNC: 0.48 MG/DL (ref 0.2–1)
BUN SERPL-MCNC: 16 MG/DL (ref 5–25)
CALCIUM SERPL-MCNC: 9.3 MG/DL (ref 8.4–10.2)
CHLORIDE SERPL-SCNC: 103 MMOL/L (ref 96–108)
CHOLEST SERPL-MCNC: 238 MG/DL
CO2 SERPL-SCNC: 26 MMOL/L (ref 21–32)
CREAT SERPL-MCNC: 0.99 MG/DL (ref 0.6–1.3)
EOSINOPHIL # BLD AUTO: 0.05 THOUSAND/ÂΜL (ref 0–0.61)
EOSINOPHIL NFR BLD AUTO: 1 % (ref 0–6)
ERYTHROCYTE [DISTWIDTH] IN BLOOD BY AUTOMATED COUNT: 14 % (ref 11.6–15.1)
GFR SERPL CREATININE-BSD FRML MDRD: 54 ML/MIN/1.73SQ M
GLUCOSE P FAST SERPL-MCNC: 84 MG/DL (ref 65–99)
HCT VFR BLD AUTO: 34 % (ref 34.8–46.1)
HDLC SERPL-MCNC: 68 MG/DL
HGB BLD-MCNC: 10.7 G/DL (ref 11.5–15.4)
IMM GRANULOCYTES # BLD AUTO: 0.02 THOUSAND/UL (ref 0–0.2)
IMM GRANULOCYTES NFR BLD AUTO: 1 % (ref 0–2)
LDLC SERPL CALC-MCNC: 156 MG/DL (ref 0–100)
LYMPHOCYTES # BLD AUTO: 0.67 THOUSANDS/ÂΜL (ref 0.6–4.47)
LYMPHOCYTES NFR BLD AUTO: 16 % (ref 14–44)
MCH RBC QN AUTO: 30.3 PG (ref 26.8–34.3)
MCHC RBC AUTO-ENTMCNC: 31.5 G/DL (ref 31.4–37.4)
MCV RBC AUTO: 96 FL (ref 82–98)
MONOCYTES # BLD AUTO: 0.59 THOUSAND/ÂΜL (ref 0.17–1.22)
MONOCYTES NFR BLD AUTO: 14 % (ref 4–12)
NEUTROPHILS # BLD AUTO: 2.95 THOUSANDS/ÂΜL (ref 1.85–7.62)
NEUTS SEG NFR BLD AUTO: 67 % (ref 43–75)
NRBC BLD AUTO-RTO: 0 /100 WBCS
PLATELET # BLD AUTO: 186 THOUSANDS/UL (ref 149–390)
PMV BLD AUTO: 11 FL (ref 8.9–12.7)
POTASSIUM SERPL-SCNC: 4.8 MMOL/L (ref 3.5–5.3)
PROT SERPL-MCNC: 8 G/DL (ref 6.4–8.4)
RBC # BLD AUTO: 3.53 MILLION/UL (ref 3.81–5.12)
SODIUM SERPL-SCNC: 138 MMOL/L (ref 135–147)
TRIGL SERPL-MCNC: 70 MG/DL
TSH SERPL DL<=0.05 MIU/L-ACNC: 1.69 UIU/ML (ref 0.45–4.5)
WBC # BLD AUTO: 4.32 THOUSAND/UL (ref 4.31–10.16)

## 2024-03-09 PROCEDURE — 84443 ASSAY THYROID STIM HORMONE: CPT

## 2024-03-09 PROCEDURE — 80061 LIPID PANEL: CPT

## 2024-03-09 PROCEDURE — 36415 COLL VENOUS BLD VENIPUNCTURE: CPT

## 2024-03-09 PROCEDURE — 85025 COMPLETE CBC W/AUTO DIFF WBC: CPT

## 2024-03-09 PROCEDURE — 80053 COMPREHEN METABOLIC PANEL: CPT

## 2024-03-21 ENCOUNTER — HOSPITAL ENCOUNTER (OUTPATIENT)
Dept: NON INVASIVE DIAGNOSTICS | Facility: MEDICAL CENTER | Age: 79
Discharge: HOME | End: 2024-03-21
Payer: COMMERCIAL

## 2024-03-21 VITALS
BODY MASS INDEX: 22.88 KG/M2 | DIASTOLIC BLOOD PRESSURE: 70 MMHG | HEART RATE: 82 BPM | SYSTOLIC BLOOD PRESSURE: 142 MMHG | WEIGHT: 134 LBS | HEIGHT: 64 IN

## 2024-03-21 DIAGNOSIS — E03.9 HYPOTHYROIDISM, UNSPECIFIED TYPE: ICD-10-CM

## 2024-03-21 DIAGNOSIS — I48.0 PAROXYSMAL ATRIAL FIBRILLATION (HCC): ICD-10-CM

## 2024-03-21 DIAGNOSIS — R42 DIZZINESS: ICD-10-CM

## 2024-03-21 LAB
AORTIC ROOT: 2.8 CM
AORTIC VALVE MEAN VELOCITY: 14 M/S
APICAL FOUR CHAMBER EJECTION FRACTION: 65 %
ASCENDING AORTA: 2.8 CM
AV AREA BY CONTINUOUS VTI: 0.9 CM2
AV AREA PEAK VELOCITY: 0.7 CM2
AV LVOT MEAN GRADIENT: 1 MMHG
AV LVOT PEAK GRADIENT: 1 MMHG
AV MEAN GRADIENT: 9 MMHG
AV PEAK GRADIENT: 19 MMHG
AV REGURGITATION PRESSURE HALF TIME: 457 MS
AV VALVE AREA: 0.87 CM2
AV VELOCITY RATIO: 0.28
BSA FOR ECHO PROCEDURE: 1.65 M2
DOP CALC AO PEAK VEL: 2.17 M/S
DOP CALC AO VTI: 51.05 CM
DOP CALC LVOT AREA: 2.54 CM2
DOP CALC LVOT CARDIAC INDEX: 1.75 L/MIN/M2
DOP CALC LVOT CARDIAC OUTPUT: 2.89 L/MIN
DOP CALC LVOT DIAMETER: 1.8 CM
DOP CALC LVOT PEAK VEL VTI: 17.56 CM
DOP CALC LVOT PEAK VEL: 0.6 M/S
DOP CALC LVOT STROKE INDEX: 26.7 ML/M2
DOP CALC LVOT STROKE VOLUME: 44.66
E WAVE DECELERATION TIME: 242 MS
E/A RATIO: 1.31
FRACTIONAL SHORTENING: 34 (ref 28–44)
INTERVENTRICULAR SEPTUM IN DIASTOLE (PARASTERNAL SHORT AXIS VIEW): 0.9 CM
INTERVENTRICULAR SEPTUM: 0.9 CM (ref 0.6–1.1)
LAAS-AP2: 16.8 CM2
LAAS-AP4: 21.5 CM2
LEFT ATRIUM AREA SYSTOLE SINGLE PLANE A4C: 18.7 CM2
LEFT ATRIUM SIZE: 3.9 CM
LEFT ATRIUM VOLUME (MOD BIPLANE): 59 ML
LEFT ATRIUM VOLUME INDEX (MOD BIPLANE): 35.8 ML/M2
LEFT INTERNAL DIMENSION IN SYSTOLE: 2.3 CM (ref 2.1–4)
LEFT VENTRICULAR INTERNAL DIMENSION IN DIASTOLE: 3.5 CM (ref 3.5–6)
LEFT VENTRICULAR POSTERIOR WALL IN END DIASTOLE: 0.9 CM
LEFT VENTRICULAR STROKE VOLUME: 34 ML
LVSV (TEICH): 34 ML
MITRAL REGURGITATION PEAK VELOCITY: 4.7 M/S
MITRAL VALVE MEAN INFLOW VELOCITY: 3.98 M/S
MITRAL VALVE REGURGITANT PEAK GRADIENT: 88 MMHG
MV E'TISSUE VEL-SEP: 8 CM/S
MV PEAK A VEL: 0.85 M/S
MV PEAK E VEL: 111 CM/S
MV STENOSIS PRESSURE HALF TIME: 70 MS
MV VALVE AREA P 1/2 METHOD: 3.14
PA SYSTOLIC PRESSURE: 37 MMHG
RIGHT ATRIAL 2D VOLUME: 41 ML
RIGHT ATRIUM AREA SYSTOLE A4C: 16.4 CM2
RIGHT VENTRICLE ID DIMENSION: 4 CM
SL CV AV DECELERATION TIME RETROGRADE: 1575 MS
SL CV AV PEAK GRADIENT RETROGRADE: 74 MMHG
SL CV DOP CALC MV VTI RETROGRADE: 172.5 CM
SL CV LEFT ATRIUM LENGTH A2C: 5 CM
SL CV LV EF: 60
SL CV MV MEAN GRADIENT RETROGRADE: 67 MMHG
SL CV PED ECHO LEFT VENTRICLE DIASTOLIC VOLUME (MOD BIPLANE) 2D: 52 ML
SL CV PED ECHO LEFT VENTRICLE SYSTOLIC VOLUME (MOD BIPLANE) 2D: 18 ML
TR MAX PG: 38 MMHG
TR PEAK VELOCITY: 3.1 M/S
TRICUSPID ANNULAR PLANE SYSTOLIC EXCURSION: 2.9 CM
TRICUSPID VALVE PEAK REGURGITATION VELOCITY: 3.06 M/S

## 2024-03-21 PROCEDURE — 93306 TTE W/DOPPLER COMPLETE: CPT | Performed by: INTERNAL MEDICINE

## 2024-03-21 PROCEDURE — 93306 TTE W/DOPPLER COMPLETE: CPT

## 2024-03-22 ENCOUNTER — OFFICE VISIT (OUTPATIENT)
Dept: FAMILY MEDICINE CLINIC | Facility: CLINIC | Age: 79
End: 2024-03-22
Payer: COMMERCIAL

## 2024-03-22 VITALS
RESPIRATION RATE: 16 BRPM | HEIGHT: 64 IN | DIASTOLIC BLOOD PRESSURE: 58 MMHG | OXYGEN SATURATION: 99 % | WEIGHT: 132 LBS | TEMPERATURE: 98.2 F | HEART RATE: 76 BPM | SYSTOLIC BLOOD PRESSURE: 136 MMHG | BODY MASS INDEX: 22.53 KG/M2

## 2024-03-22 DIAGNOSIS — I48.0 PAROXYSMAL A-FIB (HCC): ICD-10-CM

## 2024-03-22 DIAGNOSIS — R53.83 OTHER FATIGUE: ICD-10-CM

## 2024-03-22 DIAGNOSIS — M81.6 LOCALIZED OSTEOPOROSIS (LEQUESNE): ICD-10-CM

## 2024-03-22 DIAGNOSIS — S82.045A CLOSED NONDISPLACED COMMINUTED FRACTURE OF LEFT PATELLA, INITIAL ENCOUNTER: Primary | ICD-10-CM

## 2024-03-22 DIAGNOSIS — Z13.220 SCREENING CHOLESTEROL LEVEL: ICD-10-CM

## 2024-03-22 DIAGNOSIS — E03.4 HYPOTHYROIDISM DUE TO ACQUIRED ATROPHY OF THYROID: ICD-10-CM

## 2024-03-22 PROCEDURE — G2211 COMPLEX E/M VISIT ADD ON: HCPCS | Performed by: FAMILY MEDICINE

## 2024-03-22 PROCEDURE — 99214 OFFICE O/P EST MOD 30 MIN: CPT | Performed by: FAMILY MEDICINE

## 2024-03-22 RX ORDER — THYROID 60 MG/1
60 TABLET ORAL DAILY
Qty: 90 TABLET | Refills: 1 | Status: SHIPPED | OUTPATIENT
Start: 2024-03-22

## 2024-03-22 NOTE — PROGRESS NOTES
Name: Natali Hernandez      : 1945      MRN: [id number]  Encounter Provider: Waqar Hart MD  Encounter Date: 3/22/2024   Encounter department: [facility]    Assessment & Plan     1. Closed nondisplaced comminuted fracture of left patella, initial encounter  -     DXA bone density spine hip and pelvis; Future; Expected date: 2024    2. Paroxysmal A-fib (HCC)    3. Hypothyroidism due to acquired atrophy of thyroid  -     TSH, 3rd generation with Free T4 reflex; Future  -     thyroid (ARMOUR) 60 MG tablet; Take 1 tablet (60 mg total) by mouth daily    4. Other fatigue  -     Comprehensive metabolic panel; Future  -     CBC and differential; Future  -     Magnesium; Future  -     Uric acid; Future  -     UA/M w/rflx Culture, Comp    5. Screening cholesterol level  -     Lipid Panel with Direct LDL reflex; Future    6. Localized osteoporosis ([place])  -     DXA bone density spine hip and pelvis; Future; Expected date: 2024           Subjective     This 70-year-old female here today for checkup on multimedical problems patient with paroxysmal atrial fibrillation history of hypothyroidism patient also has a pacemaker and also has been seen by cardiology recently.  Patient was he had an echo which looks pretty unremarkable she does have some mild aortic stenosis that is so mild she is a good ejection fraction of 60%.  Her blood pressure is well-controlled.  Patient also recently with a fall she has been complaining about having some weakness and her blood pressure has been stable.  She has been talking her cardiology about possibly decreasing her Cardizem.  She may benefit from changing her Coreg to something like once a day Toprol at a low dose we will see.  She sees her cardiologist in about a month.  I will see about refilling her thyroid supplementation she takes [place] and I will see about checking her lab work that she is due for.      Review of Systems    Past  Medical History:   Diagnosis Date   • A-fib (HCC)    • History of loop recorder    • Multiple falls    • Pacemaker    • Thyroid disease      Past Surgical History:   Procedure Laterality Date   • APPENDECTOMY     • BILATERAL HIP ARTHROSCOPY Right    • CARDIAC LOOP RECORDER     • CARDIAC PACEMAKER PLACEMENT       History reviewed. No pertinent family history.  Social History     Socioeconomic History   • Marital status:      Spouse name: None   • Number of children: None   • Years of education: None   • Highest education level: None   Occupational History   • None   Tobacco Use   • Smoking status: Unknown   • Smokeless tobacco: None   Substance and Sexual Activity   • Alcohol use: None   • Drug use: None   • Sexual activity: None   Other Topics Concern   • None   Social History Narrative   • None     Social Determinants of Health     Financial Resource Strain: Not on file   Food Insecurity: Not on file   Transportation Needs: Not on file   Physical Activity: Not on file   Stress: Not on file   Social Connections: Not on file   Intimate Partner Violence: Not on file   Housing Stability: Not on file     Current Outpatient Medications on File Prior to Visit   Medication Sig   • acetaminophen (TYLENOL) 325 mg tablet Take 2 tablets (650 mg total) by mouth every 6 (six) hours as needed for mild pain   • carvedilol (COREG) 3.125 mg tablet Take 3.125 mg by mouth 2 (two) times a day with meals   • diltiazem (DILACOR XR) 240 MG 24 hr capsule Take 240 mg by mouth daily   • fexofenadine (ALLEGRA) 180 MG tablet Take 180 mg by mouth daily   • rivaroxaban (Xarelto) 20 mg tablet Take 20 mg by mouth   • [DISCONTINUED] thyroid (ARMOUR) 60 MG tablet Take 60 mg by mouth daily   • methocarbamol (ROBAXIN) 500 mg tablet Take 1 tablet (500 mg total) by mouth 4 (four) times a day (Patient not taking: Reported on 3/22/2024)   • naproxen (EC NAPROSYN) 375 MG TBEC Take 1 tablet (375 mg total) by mouth 2 (two) times a day with meals  "(Patient not taking: Reported on 3/22/2024)     Allergies   Allergen Reactions   • Other Other (See Comments)     Any \"steroids\" including prednisone and eye steroids; causes muscle weakness, joint pain        There is no immunization history on file for this patient.    Objective     /58 (BP Location: Left arm, Patient Position: Sitting, Cuff Size: Standard)   Pulse 76   Temp 98.2 °F (36.8 °C) (Temporal)   Resp 16   Ht 5' 4\" (1.626 m)   Wt 59.9 kg (132 lb)   SpO2 99%   BMI 22.66 kg/m²     Physical Exam  Vitals and nursing note reviewed. Exam conducted with a chaperone present.   Constitutional:       Appearance: She is well-developed.   HENT:      Head: Normocephalic and atraumatic.      Nose: Nose normal.      Mouth/Throat:      Mouth: Mucous membranes are moist.   Eyes:      General: No scleral icterus.     Conjunctiva/sclera: Conjunctivae normal.      Pupils: Pupils are equal, round, and reactive to light.   Neck:      Thyroid: No thyromegaly.   Cardiovascular:      Rate and Rhythm: Normal rate and regular rhythm.   Pulmonary:      Effort: Pulmonary effort is normal.      Breath sounds: Normal breath sounds. No wheezing.   Abdominal:      General: Bowel sounds are normal. There is no distension.      Palpations: Abdomen is soft.      Tenderness: There is no abdominal tenderness. There is no guarding or rebound.   Musculoskeletal:         General: No tenderness or deformity. Normal range of motion.      Cervical back: Normal range of motion and neck supple.   Skin:     General: Skin is warm and dry.      Findings: No erythema or rash.   Neurological:      Mental Status: She is alert and oriented to person, place, and time.      Sensory: No sensory deficit.   Psychiatric:         Mood and Affect: Mood normal.         Behavior: Behavior normal.         Thought Content: Thought content normal.         Judgment: Judgment normal.       Waqar Hart MD    "

## 2024-03-26 ENCOUNTER — APPOINTMENT (OUTPATIENT)
Dept: LAB | Facility: MEDICAL CENTER | Age: 79
End: 2024-03-26
Payer: COMMERCIAL

## 2024-03-26 DIAGNOSIS — Z13.220 SCREENING CHOLESTEROL LEVEL: ICD-10-CM

## 2024-03-26 DIAGNOSIS — R53.83 OTHER FATIGUE: ICD-10-CM

## 2024-03-26 DIAGNOSIS — E03.4 HYPOTHYROIDISM DUE TO ACQUIRED ATROPHY OF THYROID: ICD-10-CM

## 2024-03-26 LAB
ALBUMIN SERPL BCP-MCNC: 4.2 G/DL (ref 3.5–5)
ALP SERPL-CCNC: 80 U/L (ref 34–104)
ALT SERPL W P-5'-P-CCNC: 13 U/L (ref 7–52)
ANION GAP SERPL CALCULATED.3IONS-SCNC: 7 MMOL/L (ref 4–13)
AST SERPL W P-5'-P-CCNC: 18 U/L (ref 13–39)
BACTERIA UR QL AUTO: ABNORMAL /HPF
BASOPHILS # BLD AUTO: 0.04 THOUSANDS/ÂΜL (ref 0–0.1)
BASOPHILS NFR BLD AUTO: 1 % (ref 0–1)
BILIRUB SERPL-MCNC: 0.46 MG/DL (ref 0.2–1)
BILIRUB UR QL STRIP: NEGATIVE
BUN SERPL-MCNC: 18 MG/DL (ref 5–25)
CALCIUM SERPL-MCNC: 9.3 MG/DL (ref 8.4–10.2)
CHLORIDE SERPL-SCNC: 106 MMOL/L (ref 96–108)
CHOLEST SERPL-MCNC: 219 MG/DL
CLARITY UR: CLEAR
CO2 SERPL-SCNC: 25 MMOL/L (ref 21–32)
COLOR UR: YELLOW
CREAT SERPL-MCNC: 1.1 MG/DL (ref 0.6–1.3)
EOSINOPHIL # BLD AUTO: 0.03 THOUSAND/ÂΜL (ref 0–0.61)
EOSINOPHIL NFR BLD AUTO: 1 % (ref 0–6)
ERYTHROCYTE [DISTWIDTH] IN BLOOD BY AUTOMATED COUNT: 14.2 % (ref 11.6–15.1)
GFR SERPL CREATININE-BSD FRML MDRD: 48 ML/MIN/1.73SQ M
GLUCOSE P FAST SERPL-MCNC: 86 MG/DL (ref 65–99)
GLUCOSE UR STRIP-MCNC: NEGATIVE MG/DL
HCT VFR BLD AUTO: 37.5 % (ref 34.8–46.1)
HDLC SERPL-MCNC: 69 MG/DL
HGB BLD-MCNC: 11.8 G/DL (ref 11.5–15.4)
HGB UR QL STRIP.AUTO: ABNORMAL
IMM GRANULOCYTES # BLD AUTO: 0.01 THOUSAND/UL (ref 0–0.2)
IMM GRANULOCYTES NFR BLD AUTO: 0 % (ref 0–2)
KETONES UR STRIP-MCNC: ABNORMAL MG/DL
LDLC SERPL CALC-MCNC: 125 MG/DL (ref 0–100)
LEUKOCYTE ESTERASE UR QL STRIP: ABNORMAL
LYMPHOCYTES # BLD AUTO: 0.71 THOUSANDS/ÂΜL (ref 0.6–4.47)
LYMPHOCYTES NFR BLD AUTO: 17 % (ref 14–44)
MAGNESIUM SERPL-MCNC: 2.4 MG/DL (ref 1.9–2.7)
MCH RBC QN AUTO: 30.3 PG (ref 26.8–34.3)
MCHC RBC AUTO-ENTMCNC: 31.5 G/DL (ref 31.4–37.4)
MCV RBC AUTO: 96 FL (ref 82–98)
MONOCYTES # BLD AUTO: 0.33 THOUSAND/ÂΜL (ref 0.17–1.22)
MONOCYTES NFR BLD AUTO: 8 % (ref 4–12)
NEUTROPHILS # BLD AUTO: 3.04 THOUSANDS/ÂΜL (ref 1.85–7.62)
NEUTS SEG NFR BLD AUTO: 73 % (ref 43–75)
NITRITE UR QL STRIP: NEGATIVE
NON-SQ EPI CELLS URNS QL MICRO: ABNORMAL /HPF
NRBC BLD AUTO-RTO: 0 /100 WBCS
PH UR STRIP.AUTO: 6 [PH]
PLATELET # BLD AUTO: 208 THOUSANDS/UL (ref 149–390)
PMV BLD AUTO: 11.1 FL (ref 8.9–12.7)
POTASSIUM SERPL-SCNC: 4.8 MMOL/L (ref 3.5–5.3)
PROT SERPL-MCNC: 8.1 G/DL (ref 6.4–8.4)
PROT UR STRIP-MCNC: ABNORMAL MG/DL
RBC # BLD AUTO: 3.9 MILLION/UL (ref 3.81–5.12)
RBC #/AREA URNS AUTO: ABNORMAL /HPF
SODIUM SERPL-SCNC: 138 MMOL/L (ref 135–147)
SP GR UR STRIP.AUTO: 1.02 (ref 1–1.03)
TRIGL SERPL-MCNC: 123 MG/DL
TSH SERPL DL<=0.05 MIU/L-ACNC: 2.16 UIU/ML (ref 0.45–4.5)
URATE SERPL-MCNC: 3.7 MG/DL (ref 2–7.5)
UROBILINOGEN UR STRIP-ACNC: 2 MG/DL
WBC # BLD AUTO: 4.16 THOUSAND/UL (ref 4.31–10.16)
WBC #/AREA URNS AUTO: ABNORMAL /HPF

## 2024-03-26 PROCEDURE — 36415 COLL VENOUS BLD VENIPUNCTURE: CPT

## 2024-03-26 PROCEDURE — 84550 ASSAY OF BLOOD/URIC ACID: CPT

## 2024-03-26 PROCEDURE — 85025 COMPLETE CBC W/AUTO DIFF WBC: CPT

## 2024-03-26 PROCEDURE — 81001 URINALYSIS AUTO W/SCOPE: CPT

## 2024-03-26 PROCEDURE — 80061 LIPID PANEL: CPT

## 2024-03-26 PROCEDURE — 80053 COMPREHEN METABOLIC PANEL: CPT

## 2024-03-26 PROCEDURE — 87086 URINE CULTURE/COLONY COUNT: CPT

## 2024-03-26 PROCEDURE — 87077 CULTURE AEROBIC IDENTIFY: CPT

## 2024-03-26 PROCEDURE — 84443 ASSAY THYROID STIM HORMONE: CPT

## 2024-03-26 PROCEDURE — 87186 SC STD MICRODIL/AGAR DIL: CPT

## 2024-03-26 PROCEDURE — 83735 ASSAY OF MAGNESIUM: CPT

## 2024-03-28 LAB
BACTERIA UR CULT: ABNORMAL
BACTERIA UR CULT: ABNORMAL

## 2024-04-24 ENCOUNTER — HOSPITAL ENCOUNTER (OUTPATIENT)
Dept: RADIOLOGY | Facility: MEDICAL CENTER | Age: 79
Discharge: HOME | End: 2024-04-24
Payer: COMMERCIAL

## 2024-04-24 DIAGNOSIS — M81.6 LOCALIZED OSTEOPOROSIS (LEQUESNE): ICD-10-CM

## 2024-04-24 DIAGNOSIS — S82.045A CLOSED NONDISPLACED COMMINUTED FRACTURE OF LEFT PATELLA, INITIAL ENCOUNTER: ICD-10-CM

## 2024-04-24 PROCEDURE — 77080 DXA BONE DENSITY AXIAL: CPT

## 2024-04-25 ENCOUNTER — OFFICE VISIT (OUTPATIENT)
Dept: CARDIOLOGY CLINIC | Facility: MEDICAL CENTER | Age: 79
End: 2024-04-25
Payer: COMMERCIAL

## 2024-04-25 VITALS
SYSTOLIC BLOOD PRESSURE: 122 MMHG | BODY MASS INDEX: 23.22 KG/M2 | WEIGHT: 136 LBS | DIASTOLIC BLOOD PRESSURE: 80 MMHG | OXYGEN SATURATION: 96 % | HEART RATE: 99 BPM | HEIGHT: 64 IN

## 2024-04-25 DIAGNOSIS — Z95.0 CARDIAC PACEMAKER IN SITU: ICD-10-CM

## 2024-04-25 DIAGNOSIS — E78.5 HYPERLIPIDEMIA, UNSPECIFIED HYPERLIPIDEMIA TYPE: ICD-10-CM

## 2024-04-25 DIAGNOSIS — I48.0 PAROXYSMAL ATRIAL FIBRILLATION (HCC): Primary | ICD-10-CM

## 2024-04-25 DIAGNOSIS — R42 DIZZINESS: ICD-10-CM

## 2024-04-25 DIAGNOSIS — I35.0 NONRHEUMATIC AORTIC VALVE STENOSIS: ICD-10-CM

## 2024-04-25 DIAGNOSIS — I34.0 NONRHEUMATIC MITRAL VALVE REGURGITATION: ICD-10-CM

## 2024-04-25 DIAGNOSIS — N18.30 STAGE 3 CHRONIC KIDNEY DISEASE, UNSPECIFIED WHETHER STAGE 3A OR 3B CKD (HCC): ICD-10-CM

## 2024-04-25 PROCEDURE — 93000 ELECTROCARDIOGRAM COMPLETE: CPT | Performed by: INTERNAL MEDICINE

## 2024-04-25 PROCEDURE — 99214 OFFICE O/P EST MOD 30 MIN: CPT | Performed by: INTERNAL MEDICINE

## 2024-04-25 RX ORDER — ROSUVASTATIN CALCIUM 5 MG/1
5 TABLET, COATED ORAL DAILY
Qty: 30 TABLET | Refills: 2 | Status: SHIPPED | OUTPATIENT
Start: 2024-04-25

## 2024-04-25 RX ORDER — DILTIAZEM HYDROCHLORIDE 240 MG/1
240 CAPSULE, EXTENDED RELEASE ORAL DAILY
Qty: 90 CAPSULE | Refills: 3 | Status: SHIPPED | OUTPATIENT
Start: 2024-04-25

## 2024-04-25 RX ORDER — FLECAINIDE ACETATE 50 MG/1
50 TABLET ORAL 2 TIMES DAILY
Qty: 60 TABLET | Refills: 3 | Status: SHIPPED | OUTPATIENT
Start: 2024-04-25

## 2024-04-25 NOTE — PROGRESS NOTES
[facility] [place]   [address]  [address]  [facility] [address]                                            Cardiology Office Follow up  Natali Hernandez, 78 y.o. female  YOB: 1945  MRN: [id number] Encounter: [id number]      PCP - Waqar Hart MD  Referring Provider - No ref. provider found    Chief Complaint   Patient presents with   • Follow-up     1 month f/up       Assessment  Cardiac dual chamber pacemaker in-situ ([employer])  Implanted: 4/10/23 in [place]  4/14/23: tiny apical left penumothorax  Paroxysmal Atrial Fibrillation  3/2024 TTE -LVEF 60%, mild AS, mild AI, mild-moderate MR, moderate TR, mild pulmonary hypertension  Dizziness  Hypertension  Hypothyroidism  On armour thyroid    Plan  Cardiac dual chamber pacemaker in-situ ([employer]tronic)  Implanted 4/2023 in [place] for Afib/SSS  No apparent issues with pacemaker thereafter, but she has had 2 falls in the interim  Still in the process of getting established with device clinic, has an appointment for device check next week --> follow up on same to see if she is still in Afib after initiating flecainide, and if there were any prior events that could have caused her fall    Paroxysmal Atrial Fibrillation, dizziness  Overview  3/8/24 - initial OV with me  Currently in sinus rhythm, on carvedilol 3.125 mg twice daily, Cardizem  mg daily  She feels she has dizziness since starting on these medications, but both her blood pressure and heart rate are normal and she even has a pacemaker in place so I am not so sure that the medications are causing it  We will get device check and assess, if no significant A-fib then can consider discontinuing carvedilol  4/25/24 - device check still pending.  Still has on and off dizziness.  Today she is actually in A-fib with heart rate in 100-110  Impression  Suspect intermittent atrial fibrillation episodes may be causing her symptoms of dizziness and fatigue , and she will benefit from  rhythm control attempt  Plan  Continue Cardizem  mg daily, carvedilol 3.125 mg bid  She is concerned about uptitrating carvedilol as she feels lot of her symptoms started after adding meds  Start flecainide 50 mg bid (she seems to have been on it at some point in the past, but eventually this was either discontinued or follow-up for the list, as I was unable to find any clear reason for discontinuation and she herself does not know why it was stopped)  Follow-up device check next week.  If still in A-fib, plan to uptitrate flecainide to 100 mg twice daily  Continue Xarelto 20 mg daily    Aortic stenosis, mitral regurgitation  TTE from March 2024 - mild aortic stenosis and mild to moderate MR/TR, mild PHTN  No gross volume overload  No major shortness of breath, but she has dizziness and fatigue - possibly related to poorly tolerated Afib  Monitor on current therapy for now    Hypertension  Blood pressure on higher side, 142/70 but acceptable  Continue Cardizem XR to 240 mg daily, carvedilol 3.125 mg.    Hyperlipidemia        Results for orders placed or performed in visit on 04/25/24   POCT ECG    Impression    Atrial fibrillation with rapid ventricular response ( bpm)  Low voltage QRS         Orders Placed This Encounter   Procedures   • POCT ECG         Return in about 6 weeks (around 6/6/2024), or if symptoms worsen or fail to improve.      History of Present Illness   78 y.o. female comes in as a new patient for consultation and establishment of care.  She recently moved to this area from [place] and is now in the process of establishing cardiac care here.    She notes to me that she has had ongoing issues with atrial fibrillation since 2022 when she was in [place].    12/2022: cardiac monitor implanted to further evaluate this.    4/10/2023: pacemaker implant .  Mid-4/2023: Within a week after the pacemaker implant, apparently she fell and broke her left hip --> surgical left hip hemiarthroplasty  ([place]).  She continued to otherwise do well from the A-fib standpoint but continued to have dizziness ongoing for several months.  She herself felt that it was related to the medication since it started around that time.    11/2023: Fall while walking her dog outside, despite having a pacemaker in place    Over the past 3 to 4 months she has continued to have constant ongoing dizziness, which is mostly unchanged. She states it is worse when she is outside walking.  She is unable to tell me any other clear triggers of her aggravating factors for this.  It is unchanged with change in position or from sit to stand, and in fact was reportedly present throughout the visit.  She denies any vertigo like sensation.    Interval history - 4/25/2024  She returns for follow-up after 6 weeks.  She completed the echocardiogram and has been taking the medications as prescribed but continues to report on and off symptoms of dizziness.  Today she is back in A-fib on clinical exam, which was subsequently confirmed on ECG as well.       Historical Information   Past Medical History:   Diagnosis Date   • A-fib ([facility])    • History of loop recorder    • Multiple falls    • Pacemaker    • Thyroid disease      Past Surgical History:   Procedure Laterality Date   • APPENDECTOMY     • BILATERAL HIP ARTHROSCOPY Right    • CARDIAC LOOP RECORDER     • CARDIAC PACEMAKER PLACEMENT       History reviewed. No pertinent family history.  Current Outpatient Medications   Medication Instructions   • acetaminophen (TYLENOL) 650 mg, Oral, Every 6 hours PRN   • carvedilol (COREG) 3.125 mg, Oral, 2 times daily with meals   • diltiazem (DILACOR XR) 240 mg, Oral, Daily   • fexofenadine (ALLEGRA) 180 mg, Oral, Daily   • flecainide (TAMBOCOR) 50 mg, Oral, 2 times daily   • methocarbamol (ROBAXIN) 500 mg, Oral, 4 times daily   • rivaroxaban (XARELTO) 20 mg, Oral, Daily with breakfast   • rosuvastatin (CRESTOR) 5 mg, Oral, Daily   • thyroid (ARMOUR) 60 mg,  "Oral, Daily      Allergies   Allergen Reactions   • Other Other (See Comments)     Any \"steroids\" including prednisone and eye steroids; causes muscle weakness, joint pain      Social History     Socioeconomic History   • Marital status:      Spouse name: None   • Number of children: None   • Years of education: None   • Highest education level: None   Occupational History   • None   Tobacco Use   • Smoking status: Unknown   • Smokeless tobacco: None   Substance and Sexual Activity   • Alcohol use: None   • Drug use: None   • Sexual activity: None   Other Topics Concern   • None   Social History Narrative   • None     Social Determinants of Health     Financial Resource Strain: Not on file   Food Insecurity: Not on file   Transportation Needs: Not on file   Physical Activity: Not on file   Stress: Not on file   Social Connections: Not on file   Intimate Partner Violence: Not on file   Housing Stability: Not on file        Review of Systems   All other systems reviewed and are negative.        Vitals:  Vitals:    04/25/24 1425   BP: 122/80   BP Location: Left arm   Patient Position: Sitting   Cuff Size: Adult   Pulse: 99   SpO2: 96%   Weight: 61.7 kg (136 lb)   Height: 5' 4\" (1.626 m)     BMI - Body mass index is 23.34 kg/m².  Wt Readings from Last 7 Encounters:   04/25/24 61.7 kg (136 lb)   03/22/24 59.9 kg (132 lb)   03/21/24 60.8 kg (134 lb)   03/08/24 60.8 kg (134 lb)   01/29/24 57.6 kg (127 lb)   12/18/23 57.6 kg (127 lb)   11/13/23 58 kg (127 lb 13.9 oz)       Physical Exam  Vitals and nursing note reviewed.   Constitutional:       General: She is not in acute distress.     Appearance: Normal appearance. She is well-developed. She is not ill-appearing.   HENT:      Head: Normocephalic and atraumatic.      Nose: No congestion.   Eyes:      General: No scleral icterus.     Conjunctiva/sclera: Conjunctivae normal.   Neck:      Vascular: No carotid bruit or JVD.   Cardiovascular:      Rate and Rhythm: Normal " "rate and regular rhythm.      Pulses: Normal pulses.      Heart sounds: Normal heart sounds. No murmur heard.     No friction rub. No gallop.   Pulmonary:      Effort: Pulmonary effort is normal. No respiratory distress.      Breath sounds: Normal breath sounds. No rales.   Abdominal:      General: There is no distension.      Palpations: Abdomen is soft.      Tenderness: There is no abdominal tenderness.   Musculoskeletal:         General: No swelling or tenderness.      Cervical back: Neck supple.      Right lower leg: No edema.      Left lower leg: No edema.   Skin:     General: Skin is warm.   Neurological:      General: No focal deficit present.      Mental Status: She is alert and oriented to person, place, and time. Mental status is at baseline.   Psychiatric:         Mood and Affect: Mood normal.         Behavior: Behavior normal.         Thought Content: Thought content normal.           Labs:  CBC:   Lab Results   Component Value Date    WBC 4.16 (L) 03/26/2024    RBC 3.90 03/26/2024    HGB 11.8 03/26/2024    HCT 37.5 03/26/2024    MCV 96 03/26/2024     03/26/2024    RDW 14.2 03/26/2024       CMP:   Lab Results   Component Value Date    K 4.8 03/26/2024     03/26/2024    CO2 25 03/26/2024    BUN 18 03/26/2024    CREATININE 1.10 03/26/2024    EGFR 48 03/26/2024    GLUCOSE 98 11/01/2023    CALCIUM 9.3 03/26/2024    AST 18 03/26/2024    ALT 13 03/26/2024    ALKPHOS 80 03/26/2024       Magnesium:  Lab Results   Component Value Date    MG 2.4 03/26/2024       Lipid Profile:   Lab Results   Component Value Date    HDL 69 03/26/2024    TRIG 123 03/26/2024    LDLCALC 125 (H) 03/26/2024       Thyroid Studies:   Lab Results   Component Value Date    JDQ4TSCWDGVW 2.158 03/26/2024       A1c:  No components found for: \"HGA1C\"    INR:  No results found for: \"INR\"5    Cardiac testing:   No results found for this or any previous visit.    No results found for this or any previous visit.    No results found " for this or any previous visit.    No results found for this or any previous visit.      Echo complete w/ contrast if indicated  •  Left Ventricle: Left ventricular cavity size is normal. Wall thickness   is normal. The left ventricular ejection fraction is 60%. Systolic   function is normal. Wall motion is normal.  •  Left Atrium: The atrium is mildly dilated.  •  Aortic Valve: There is mild regurgitation. There is mild stenosis. Peak   gradient 20 mmHg, mean gradeint 10 mmHg.  •  Mitral Valve: There is mild to moderate regurgitation.  •  Tricuspid Valve: There is moderate regurgitation.  •  Pulmonary Artery: The estimated pulmonary artery systolic pressure is   37.0 mmHg.

## 2024-04-30 ENCOUNTER — IN-CLINIC DEVICE VISIT (OUTPATIENT)
Dept: CARDIOLOGY CLINIC | Facility: MEDICAL CENTER | Age: 79
End: 2024-04-30
Payer: COMMERCIAL

## 2024-04-30 DIAGNOSIS — Z95.0 PRESENCE OF CARDIAC PACEMAKER: Primary | ICD-10-CM

## 2024-04-30 PROCEDURE — 93280 PM DEVICE PROGR EVAL DUAL: CPT | Performed by: INTERNAL MEDICINE

## 2024-04-30 NOTE — PROGRESS NOTES
Results for orders placed or performed in visit on 04/30/24   Cardiac EP device report    Narrative    MDT DC PM/ACTIVE SYSTEM IS MRI CONDITIONAL  DEVICE INTERROGATED IN THE [place] OFFICE: BATTERY VOLTAGE ADEQUATE (12.7 YRS).  AP 78.2%  1.5%. ALL LEAD PARAMETERS WITHIN NORMAL LIMITS. SINCE 7/3/2023, 10 DEVICE CLASSIFIED VT EPISODES. 118 SVT-AF, & 93 AT AF EPISODES W/LONGEST >24 HRS; AVG VENT -200 BPM, LONGEST EPISODE >24 HRS, & AT AF BURDEN 13.4%.   PT TAKES XARELTO, FLECAINIDE, DILTIAZEM, CARVEDILOL.  EF 60% (ECHO 3/2024). TASK TO DR GUTIERREZ.  O PROGRAMMING CHANGES MADE TO DEVICE PARAMETERS. NORMAL DEVICE FUNCTION. PAS/RG

## 2024-05-01 PROBLEM — M81.0 AGE-RELATED OSTEOPOROSIS WITHOUT CURRENT PATHOLOGICAL FRACTURE: Status: ACTIVE | Noted: 2024-05-01

## 2024-05-01 PROBLEM — E78.00 PURE HYPERCHOLESTEROLEMIA: Status: ACTIVE | Noted: 2024-05-01

## 2024-05-02 ENCOUNTER — TELEPHONE (OUTPATIENT)
Dept: FAMILY MEDICINE CLINIC | Facility: CLINIC | Age: 79
End: 2024-05-02

## 2024-05-02 ENCOUNTER — OFFICE VISIT (OUTPATIENT)
Dept: FAMILY MEDICINE CLINIC | Facility: CLINIC | Age: 79
End: 2024-05-02
Payer: COMMERCIAL

## 2024-05-02 VITALS
DIASTOLIC BLOOD PRESSURE: 70 MMHG | WEIGHT: 137 LBS | HEART RATE: 72 BPM | HEIGHT: 64 IN | SYSTOLIC BLOOD PRESSURE: 124 MMHG | TEMPERATURE: 97.3 F | BODY MASS INDEX: 23.39 KG/M2 | OXYGEN SATURATION: 98 %

## 2024-05-02 DIAGNOSIS — E78.00 PURE HYPERCHOLESTEROLEMIA: ICD-10-CM

## 2024-05-02 DIAGNOSIS — N18.30 STAGE 3 CHRONIC KIDNEY DISEASE, UNSPECIFIED WHETHER STAGE 3A OR 3B CKD (HCC): Primary | ICD-10-CM

## 2024-05-02 DIAGNOSIS — M81.0 AGE-RELATED OSTEOPOROSIS WITHOUT CURRENT PATHOLOGICAL FRACTURE: ICD-10-CM

## 2024-05-02 PROCEDURE — 1159F MED LIST DOCD IN RCRD: CPT | Performed by: FAMILY MEDICINE

## 2024-05-02 PROCEDURE — 99214 OFFICE O/P EST MOD 30 MIN: CPT | Performed by: FAMILY MEDICINE

## 2024-05-02 PROCEDURE — 1160F RVW MEDS BY RX/DR IN RCRD: CPT | Performed by: FAMILY MEDICINE

## 2024-05-02 PROCEDURE — G2211 COMPLEX E/M VISIT ADD ON: HCPCS | Performed by: FAMILY MEDICINE

## 2024-05-02 NOTE — ASSESSMENT & PLAN NOTE
Lab Results   Component Value Date    EGFR 48 03/26/2024    EGFR 54 03/09/2024    CREATININE 1.10 03/26/2024    CREATININE 0.99 03/09/2024   Pt to avoid NSAIDs and any IV dyes. Patient to follow up eoither with nephrology or  with us for  further  monitoring of  renal function.

## 2024-05-02 NOTE — ASSESSMENT & PLAN NOTE
Discussed with patient low fat diet , exercise and weight loss  to attempt to lower cholesterol prior to any medical therapy. Patient to return to office to recheck progress on these measures to further assess treatment. All patient questions answered today and patient received a low fat diet handout today.

## 2024-05-02 NOTE — PROGRESS NOTES
Name: Natali Hernandez      : 1945      MRN: [id number]  Encounter Provider: Waqar Hart MD  Encounter Date: 2024   Encounter department: [facility]    Assessment & Plan     1. Stage 3 chronic kidney disease, unspecified whether stage 3a or 3b CKD (HCC)  Assessment & Plan:  Lab Results   Component Value Date    EGFR 48 2024    EGFR 54 2024    CREATININE 1.10 2024    CREATININE 0.99 2024   Pt to avoid NSAIDs and any IV dyes. Patient to follow up eoither with nephrology or  with us for  further  monitoring of  renal function.       2. Pure hypercholesterolemia  Assessment & Plan:  Discussed with patient low fat diet , exercise and weight loss  to attempt to lower cholesterol prior to any medical therapy. Patient to return to office to recheck progress on these measures to further assess treatment. All patient questions answered today and patient received a low fat diet handout today.       3. Age-related osteoporosis without current pathological fracture  Assessment & Plan:  Patient to continue with weight bearing exercises as much as possible and oral intake of 1200 mg of calcium with 800 IU of Vit D to maximize bone protection. Pt  to continue  with DEXA scan every 2 years to reassess. All qustions regarding this problem answered today to patient satisfaction.     Orders:  -     Comprehensive metabolic panel; Future  -     Vitamin D 25 hydroxy; Future  -     romosozumab-aqqg (EVENITY) subcutaneous injection; Inject 2.34 mL (210 mg total) under the skin every 30 (thirty) days        Falls Plan of Care: balance, strength, and gait training instructions were provided. Home safety education provided.     Urinary Incontinence Plan of Care: counseling topics discussed: practice Kegel (pelvic floor strengthening) exercises, use restroom every 2 hours, limit alcohol, caffeine, spicy foods, and acidic foods, limiting fluid intake 3-4 hours before bed, weight  loss, preventing constipation and limiting fluid intake to 60 oz. per day.       Subjective     70-year-old female here today for checkup on multimedical problems patient with history of chronic stage IIIa chronic kidney disease also history of arrhythmia with pacemaker patient also with osteoporosis with multiple fractures in the past she has had bilateral hip fractures as well as patellar fracture and nasal fracture.  Also with hypertension arthritis history of A-fib and history of stroke.  This patient has not had any coronary disease or heart attack.  Discussed with patient at length her length of fractures and her osteoporosis.  This patient will be a great candidate for Evenity and I talked her about that and she is very interested in that we will see about getting her preauthorized for that this patient is currently not taking any calcium or vitamin D because she was told in the past by her doctors years ago that she should not take that due to her kidney function her kidney function is stage III AA there is no reason why she could not take this her calcium level is normal I will monitor her calcium and her vitamin D levels when she starts taking the medicine and we will make sure that she is doing okay with that.  Patient has had multiple fractures over the years and were going to try and prevent any more.  Patient was taken Fosamax over 10 years ago and had stopped that.      Review of Systems   Constitutional:  Negative for activity change, appetite change, fatigue and fever.   HENT:  Negative for congestion, ear pain, postnasal drip, rhinorrhea, sinus pressure, sinus pain, sneezing and sore throat.    Eyes:  Negative for pain and redness.   Respiratory:  Negative for apnea, cough, chest tightness, shortness of breath and wheezing.    Cardiovascular:  Negative for chest pain, palpitations and leg swelling.   Gastrointestinal:  Negative for abdominal pain, constipation, diarrhea, nausea and vomiting.    Endocrine: Negative for cold intolerance and heat intolerance.   Genitourinary:  Negative for difficulty urinating, dysuria, frequency, hematuria and urgency.   Musculoskeletal:  Negative for arthralgias, back pain, gait problem and myalgias.   Skin:  Negative for rash.   Neurological:  Negative for dizziness, speech difficulty, weakness, numbness and headaches.   Hematological:  Does not bruise/bleed easily.   Psychiatric/Behavioral:  Negative for agitation, confusion and hallucinations.        Past Medical History:   Diagnosis Date   • A-fib (HCC)    • Allergic    • Arthritis    • Coronary artery disease    • Disease of thyroid gland    • Diverticulitis of colon    • History of loop recorder    • Hypertension    • Multiple falls    • Pacemaker    • Stroke ([facility])    • Substance abuse ([facility])    • Thyroid disease      Past Surgical History:   Procedure Laterality Date   • APPENDECTOMY     • BILATERAL HIP ARTHROSCOPY Right    • CARDIAC LOOP RECORDER     • CARDIAC PACEMAKER PLACEMENT     • EYE SURGERY     • FRACTURE SURGERY     • HYSTERECTOMY     • JOINT REPLACEMENT       History reviewed. No pertinent family history.  Social History     Socioeconomic History   • Marital status:      Spouse name: None   • Number of children: None   • Years of education: None   • Highest education level: None   Occupational History   • None   Tobacco Use   • Smoking status: Former     Current packs/day: 1.00     Average packs/day: 1 pack/day for 15.0 years (15.0 ttl pk-yrs)     Types: Cigarettes     Passive exposure: Never   • Smokeless tobacco: Never   Vaping Use   • Vaping status: Never Used   Substance and Sexual Activity   • Alcohol use: Not Currently   • Drug use: Never   • Sexual activity: Not Currently   Other Topics Concern   • None   Social History Narrative   • None     Social Determinants of Health     Financial Resource Strain: Not on file   Food Insecurity: Not on file   Transportation Needs: Not on file   Physical  "Activity: Not on file   Stress: Not on file   Social Connections: Not on file   Intimate Partner Violence: Not on file   Housing Stability: Not on file     Current Outpatient Medications on File Prior to Visit   Medication Sig   • acetaminophen (TYLENOL) 325 mg tablet Take 2 tablets (650 mg total) by mouth every 6 (six) hours as needed for mild pain   • carvedilol (COREG) 3.125 mg tablet Take 3.125 mg by mouth 2 (two) times a day with meals   • diltiazem (DILACOR XR) 240 MG 24 hr capsule Take 1 capsule (240 mg total) by mouth daily   • fexofenadine (ALLEGRA) 180 MG tablet Take 180 mg by mouth daily   • flecainide (TAMBOCOR) 50 mg tablet Take 1 tablet (50 mg total) by mouth 2 (two) times a day   • methocarbamol (ROBAXIN) 500 mg tablet Take 1 tablet (500 mg total) by mouth 4 (four) times a day (Patient not taking: Reported on 3/22/2024)   • rivaroxaban (Xarelto) 20 mg tablet Take 1 tablet (20 mg total) by mouth daily with breakfast   • rosuvastatin (CRESTOR) 5 mg tablet Take 1 tablet (5 mg total) by mouth daily   • thyroid (ARMOUR) 60 MG tablet Take 1 tablet (60 mg total) by mouth daily     Allergies   Allergen Reactions   • Other Other (See Comments)     Any \"steroids\" including prednisone and eye steroids; causes muscle weakness, joint pain        There is no immunization history on file for this patient.    Objective     /70 (BP Location: Left arm, Patient Position: Sitting, Cuff Size: Standard)   Pulse 72   Temp (!) 97.3 °F (36.3 °C)   Ht 5' 4\" (1.626 m)   Wt 62.1 kg (137 lb)   SpO2 98%   BMI 23.52 kg/m²     Physical Exam  Vitals and nursing note reviewed.   Constitutional:       Appearance: She is well-developed.   HENT:      Head: Normocephalic and atraumatic.      Nose: Nose normal.      Mouth/Throat:      Mouth: Mucous membranes are moist.   Eyes:      General: No scleral icterus.     Conjunctiva/sclera: Conjunctivae normal.      Pupils: Pupils are equal, round, and reactive to light.   Neck:      " Thyroid: No thyromegaly.   Cardiovascular:      Rate and Rhythm: Normal rate and regular rhythm.   Pulmonary:      Effort: Pulmonary effort is normal.      Breath sounds: Normal breath sounds. No wheezing.   Abdominal:      General: Bowel sounds are normal. There is no distension.      Palpations: Abdomen is soft.      Tenderness: There is no abdominal tenderness. There is no guarding or rebound.   Musculoskeletal:         General: No tenderness or deformity. Normal range of motion.      Cervical back: Normal range of motion and neck supple.   Skin:     General: Skin is warm and dry.      Findings: No erythema or rash.   Neurological:      Mental Status: She is alert and oriented to person, place, and time.      Sensory: No sensory deficit.   Psychiatric:         Mood and Affect: Mood normal.         Behavior: Behavior normal.         Thought Content: Thought content normal.         Judgment: Judgment normal.       Waqar Hart MD

## 2024-05-06 ENCOUNTER — APPOINTMENT (OUTPATIENT)
Dept: LAB | Facility: MEDICAL CENTER | Age: 79
End: 2024-05-06
Payer: COMMERCIAL

## 2024-05-06 DIAGNOSIS — M81.0 AGE-RELATED OSTEOPOROSIS WITHOUT CURRENT PATHOLOGICAL FRACTURE: ICD-10-CM

## 2024-05-06 LAB
25(OH)D3 SERPL-MCNC: 52.3 NG/ML (ref 30–100)
ALBUMIN SERPL BCP-MCNC: 4.5 G/DL (ref 3.5–5)
ALP SERPL-CCNC: 75 U/L (ref 34–104)
ALT SERPL W P-5'-P-CCNC: 14 U/L (ref 7–52)
ANION GAP SERPL CALCULATED.3IONS-SCNC: 9 MMOL/L (ref 4–13)
AST SERPL W P-5'-P-CCNC: 22 U/L (ref 13–39)
BILIRUB SERPL-MCNC: 0.41 MG/DL (ref 0.2–1)
BUN SERPL-MCNC: 18 MG/DL (ref 5–25)
CALCIUM SERPL-MCNC: 9.6 MG/DL (ref 8.4–10.2)
CHLORIDE SERPL-SCNC: 102 MMOL/L (ref 96–108)
CO2 SERPL-SCNC: 28 MMOL/L (ref 21–32)
CREAT SERPL-MCNC: 1.07 MG/DL (ref 0.6–1.3)
GFR SERPL CREATININE-BSD FRML MDRD: 49 ML/MIN/1.73SQ M
GLUCOSE P FAST SERPL-MCNC: 89 MG/DL (ref 65–99)
POTASSIUM SERPL-SCNC: 4.8 MMOL/L (ref 3.5–5.3)
PROT SERPL-MCNC: 8.3 G/DL (ref 6.4–8.4)
SODIUM SERPL-SCNC: 139 MMOL/L (ref 135–147)

## 2024-05-06 PROCEDURE — 82306 VITAMIN D 25 HYDROXY: CPT

## 2024-05-06 PROCEDURE — 36415 COLL VENOUS BLD VENIPUNCTURE: CPT

## 2024-05-06 PROCEDURE — 80053 COMPREHEN METABOLIC PANEL: CPT

## 2024-05-07 DIAGNOSIS — M81.0 AGE-RELATED OSTEOPOROSIS WITHOUT CURRENT PATHOLOGICAL FRACTURE: Primary | ICD-10-CM

## 2024-05-07 NOTE — TELEPHONE ENCOUNTER
Patient called and stated she received the approval for Evenity from [facility]. However, [facility] stated they have no record of receiving the order or the approval.  Please advise patient if it can be sent [facility] as she is scheduled with the office on 5/9.  Thank you!

## 2024-05-08 ENCOUNTER — RA CDI HCC (OUTPATIENT)
Dept: OTHER | Facility: HOSPITAL | Age: 79
End: 2024-05-08

## 2024-05-18 DIAGNOSIS — I48.0 PAROXYSMAL ATRIAL FIBRILLATION (HCC): ICD-10-CM

## 2024-05-18 DIAGNOSIS — E78.5 HYPERLIPIDEMIA, UNSPECIFIED HYPERLIPIDEMIA TYPE: ICD-10-CM

## 2024-05-19 RX ORDER — ROSUVASTATIN CALCIUM 5 MG/1
5 TABLET, COATED ORAL DAILY
Qty: 90 TABLET | Refills: 1 | Status: SHIPPED | OUTPATIENT
Start: 2024-05-19

## 2024-05-20 RX ORDER — FLECAINIDE ACETATE 50 MG/1
50 TABLET ORAL 2 TIMES DAILY
Qty: 180 TABLET | Refills: 2 | Status: SHIPPED | OUTPATIENT
Start: 2024-05-20

## 2024-05-30 ENCOUNTER — TELEPHONE (OUTPATIENT)
Age: 79
End: 2024-05-30

## 2024-05-30 ENCOUNTER — CLINICAL SUPPORT (OUTPATIENT)
Dept: FAMILY MEDICINE CLINIC | Facility: CLINIC | Age: 79
End: 2024-05-30
Payer: COMMERCIAL

## 2024-05-30 DIAGNOSIS — S82.045A CLOSED NONDISPLACED COMMINUTED FRACTURE OF LEFT PATELLA, INITIAL ENCOUNTER: Primary | ICD-10-CM

## 2024-05-30 DIAGNOSIS — M81.0 AGE-RELATED OSTEOPOROSIS WITHOUT CURRENT PATHOLOGICAL FRACTURE: ICD-10-CM

## 2024-05-30 PROCEDURE — 96372 THER/PROPH/DIAG INJ SC/IM: CPT | Performed by: FAMILY MEDICINE

## 2024-05-30 NOTE — TELEPHONE ENCOUNTER
Patient called in for update on status of Evenity injections. She reports it was approved by her insurer. Please follow up with patient for status or schedule for injection.

## 2024-06-19 ENCOUNTER — OFFICE VISIT (OUTPATIENT)
Dept: CARDIOLOGY CLINIC | Facility: MEDICAL CENTER | Age: 79
End: 2024-06-19
Payer: COMMERCIAL

## 2024-06-19 VITALS
SYSTOLIC BLOOD PRESSURE: 122 MMHG | WEIGHT: 137 LBS | BODY MASS INDEX: 23.39 KG/M2 | HEIGHT: 64 IN | DIASTOLIC BLOOD PRESSURE: 78 MMHG | OXYGEN SATURATION: 97 % | HEART RATE: 83 BPM

## 2024-06-19 DIAGNOSIS — E78.5 HYPERLIPIDEMIA, UNSPECIFIED HYPERLIPIDEMIA TYPE: ICD-10-CM

## 2024-06-19 DIAGNOSIS — R42 DIZZINESS: ICD-10-CM

## 2024-06-19 DIAGNOSIS — N18.31 STAGE 3A CHRONIC KIDNEY DISEASE (HCC): ICD-10-CM

## 2024-06-19 DIAGNOSIS — I35.0 NONRHEUMATIC AORTIC VALVE STENOSIS: ICD-10-CM

## 2024-06-19 DIAGNOSIS — I34.0 NONRHEUMATIC MITRAL VALVE REGURGITATION: ICD-10-CM

## 2024-06-19 DIAGNOSIS — I48.0 PAROXYSMAL ATRIAL FIBRILLATION (HCC): Primary | ICD-10-CM

## 2024-06-19 PROCEDURE — 99214 OFFICE O/P EST MOD 30 MIN: CPT | Performed by: INTERNAL MEDICINE

## 2024-06-19 RX ORDER — CARVEDILOL 6.25 MG/1
6.25 TABLET ORAL 2 TIMES DAILY WITH MEALS
Qty: 180 TABLET | Refills: 2 | Status: SHIPPED | OUTPATIENT
Start: 2024-06-19

## 2024-06-19 RX ORDER — EZETIMIBE 10 MG/1
10 TABLET ORAL DAILY
Qty: 30 TABLET | Refills: 2 | Status: SHIPPED | OUTPATIENT
Start: 2024-06-19

## 2024-06-19 RX ORDER — FLECAINIDE ACETATE 100 MG/1
100 TABLET ORAL 2 TIMES DAILY
Qty: 60 TABLET | Refills: 3 | Status: SHIPPED | OUTPATIENT
Start: 2024-06-19

## 2024-06-19 NOTE — PROGRESS NOTES
[facility] [place]   [address]  [address]  [place] [id number]                                            Cardiology Office Follow up  Natali Hernandez, 78 y.o. female  YOB: 1945  MRN: [id number] Encounter: [id number]      PCP - Waqar Hart MD  Referring Provider - No ref. provider found    Chief Complaint   Patient presents with   • Follow-up     6 wk follow up       Assessment  Cardiac dual chamber pacemaker in-situ ([employer]tronic)  Implanted: 4/10/23 in [place]  4/14/23: tiny apical left penumothorax  Paroxysmal Atrial Fibrillation  3/2024 TTE -LVEF 60%, mild AS, mild AI, mild-moderate MR, moderate TR, mild pulmonary hypertension  Aortic stenosis  Dizziness  Hypertension  Hypothyroidism  On armour thyroid  Osteoporosis    Plan  Cardiac dual chamber pacemaker in-situ (Medtronic)  4/2023: PPM implanted  in [place] for Afib/SSS  No apparent issues with pacemaker thereafter, but she has had 2 falls in the interim  Still in the process of getting established with device clinic, has an appointment for device check next week --> follow up on same to see if she is still in Afib after initiating flecainide, and if there were any prior events that could have caused her fall    Paroxysmal Atrial Fibrillation, dizziness  Overview  3/8/24 - initial OV with me  Currently in sinus rhythm, on carvedilol 3.125 mg twice daily, Cardizem  mg daily  She feels she has dizziness since starting on these medications  BP, HR normal and recent PPM --> unclear etiology  We will get device check and assess, if no significant A-fib then can consider discontinuing carvedilol  4/25/24 - device check still pending.  Still has on and off dizziness.    Today she is actually in A-fib with heart rate in 100-110  Started on flecainide 50 mg bid  6/2024: Reports intermittent palpitations, for which she takes extra tablet of carvedilol and flecainide on her own and it seems to work for  her  Impression  Intermittent symptomatic episodes of A-fib, 13.4% burden on last pacemake rcheck   Doing better with flecainide, but remains concerned about dizziness related to diltiazem and is worried about a fall  Plan  I suspect with better maintenance of rhythm control therapy with flecainide she will not need as much AV barry blocking agents and as patient is concerned about the diltiazem we will stop diltiazem at this point  Increase flecainide to 100 mg twice daily, carvedilol to 6.5 mg twice daily   Monitor on device check  Continue Xarelto 20 mg daily    Aortic stenosis - mild, mitral regurgitation, TR - moderate, PHTN  3/2024 TTE - mild aortic stenosis and mild to moderate MR/TR, mild PHTN  4/2024: No gross volume overload or shortness of breath  6/2024: no shortness of breath  Plan  Continue to monitor clinically for symptoms of shortness of breath, pedal edema or heart failure  Monitor periodically on echocardiogram, plan to follow-up TTE in March 2025    Hypertension  Well-controlled, 122/78  Cardizem being discontinued due to patient concerns about dizziness with it  I believe her blood pressure could possibly be getting low intermittently with the combination of carvedilol and Cardizem, which may be the cause of her dizziness  Increase carvedilol to 6.25 mg twice daily after discontinuation of Cardizem     Hyperlipidemia    She tried low-dose rosuvastatin 5 mg, but was intolerant - reports she could not walk due to muscle issues and as a result has stopped it  We will switch to alternative medication with ezetimibe 10 mg daily    No results found for this visit on 06/19/24.        No orders of the defined types were placed in this encounter.        Return in about 3 months (around 9/19/2024), or if symptoms worsen or fail to improve.      History of Present Illness   78 y.o. female comes in as a new patient for consultation and establishment of care.  She recently moved to this area from [place] and  is now in the process of establishing cardiac care here.    She notes to me that she has had ongoing issues with atrial fibrillation since 2022 when she was in [place].    12/2022: cardiac monitor implanted to further evaluate this.    4/10/2023: pacemaker implant .  Mid-4/2023: Within a week after the pacemaker implant, apparently she fell and broke her left hip --> surgical left hip hemiarthroplasty ([place]).  She continued to otherwise do well from the A-fib standpoint but continued to have dizziness ongoing for several months.  She herself felt that it was related to the medication since it started around that time.    11/2023: Fall while walking her dog outside, despite having a pacemaker in place    Over the past 3 to 4 months she has continued to have constant ongoing dizziness, which is mostly unchanged. She states it is worse when she is outside walking.  She is unable to tell me any other clear triggers of her aggravating factors for this.  It is unchanged with change in position or from sit to stand, and in fact was reportedly present throughout the visit.  She denies any vertigo like sensation.    Interval history - 4/25/2024  She returns for follow-up after 6 weeks.  She completed the echocardiogram and has been taking the medications as prescribed but continues to report on and off symptoms of dizziness.  Today she is back in A-fib on clinical exam, which was subsequently confirmed on ECG as well.     Interval history - 6/19/2024  She returns for follow-up after 2 months.  In the interim, she is doing better and completed the device check in late April.  She has been taking the flecainide and reports improvement in her palpitations.  She does still get some palpitations and she takes extra tablets on her own at these times which helps her symptoms.  No major dizziness, but she is worried about falling.       Historical Information   Past Medical History:   Diagnosis Date   • A-fib ([facility])    • Allergic   "  • Arthritis    • Coronary artery disease    • Disease of thyroid gland    • Diverticulitis of colon    • History of loop recorder    • Hypertension    • Multiple falls    • Pacemaker    • Stroke (HCC)    • Substance abuse (HCC)    • Thyroid disease      Past Surgical History:   Procedure Laterality Date   • APPENDECTOMY     • BILATERAL HIP ARTHROSCOPY Right    • CARDIAC LOOP RECORDER     • CARDIAC PACEMAKER PLACEMENT     • EYE SURGERY     • FRACTURE SURGERY     • HYSTERECTOMY     • JOINT REPLACEMENT       History reviewed. No pertinent family history.  Current Outpatient Medications   Medication Instructions   • acetaminophen (TYLENOL) 650 mg, Oral, Every 6 hours PRN   • carvedilol (COREG) 6.25 mg, Oral, 2 times daily with meals   • ezetimibe (ZETIA) 10 mg, Oral, Daily   • fexofenadine (ALLEGRA) 180 mg, Oral, Daily   • flecainide (TAMBOCOR) 100 mg, Oral, 2 times daily   • methocarbamol (ROBAXIN) 500 mg, Oral, 4 times daily   • rivaroxaban (XARELTO) 20 mg, Oral, Daily with breakfast   • romosozumab-aqqg (EVENITY) 210 mg, Subcutaneous, Every 30 days   • romosozumab-aqqg (EVENITY) 210 mg, Subcutaneous, Every 30 days   • thyroid (ARMOUR) 60 mg, Oral, Daily      Allergies   Allergen Reactions   • Other Other (See Comments)     Any \"steroids\" including prednisone and eye steroids; causes muscle weakness, joint pain      Social History     Socioeconomic History   • Marital status:      Spouse name: None   • Number of children: None   • Years of education: None   • Highest education level: None   Occupational History   • None   Tobacco Use   • Smoking status: Former     Current packs/day: 1.00     Average packs/day: 1 pack/day for 15.0 years (15.0 ttl pk-yrs)     Types: Cigarettes     Passive exposure: Never   • Smokeless tobacco: Never   Vaping Use   • Vaping status: Never Used   Substance and Sexual Activity   • Alcohol use: Not Currently   • Drug use: Never   • Sexual activity: Not Currently   Other Topics " "Concern   • None   Social History Narrative   • None     Social Determinants of Health     Financial Resource Strain: Not on file   Food Insecurity: Not on file   Transportation Needs: Not on file   Physical Activity: Not on file   Stress: Not on file   Social Connections: Not on file   Intimate Partner Violence: Not on file   Housing Stability: Not on file        Review of Systems   All other systems reviewed and are negative.        Vitals:  Vitals:    06/19/24 1327   BP: 122/78   BP Location: Left arm   Patient Position: Sitting   Cuff Size: Adult   Pulse: 83   SpO2: 97%   Weight: 62.1 kg (137 lb)   Height: 5' 4\" (1.626 m)     BMI - Body mass index is 23.52 kg/m².  Wt Readings from Last 7 Encounters:   06/19/24 62.1 kg (137 lb)   05/02/24 62.1 kg (137 lb)   04/25/24 61.7 kg (136 lb)   03/22/24 59.9 kg (132 lb)   03/21/24 60.8 kg (134 lb)   03/08/24 60.8 kg (134 lb)   01/29/24 57.6 kg (127 lb)       Physical Exam  Vitals and nursing note reviewed.   Constitutional:       General: She is not in acute distress.     Appearance: Normal appearance. She is well-developed. She is not ill-appearing.   HENT:      Head: Normocephalic and atraumatic.      Nose: No congestion.   Eyes:      General: No scleral icterus.     Conjunctiva/sclera: Conjunctivae normal.   Neck:      Vascular: No carotid bruit or JVD.   Cardiovascular:      Rate and Rhythm: Normal rate and regular rhythm.      Pulses: Normal pulses.      Heart sounds: Normal heart sounds. No murmur heard.     No friction rub. No gallop.   Pulmonary:      Effort: Pulmonary effort is normal. No respiratory distress.      Breath sounds: Normal breath sounds. No rales.   Abdominal:      General: There is no distension.      Palpations: Abdomen is soft.      Tenderness: There is no abdominal tenderness.   Musculoskeletal:         General: No swelling or tenderness.      Cervical back: Neck supple.      Right lower leg: No edema.      Left lower leg: No edema.   Skin:     " "General: Skin is warm.   Neurological:      General: No focal deficit present.      Mental Status: She is alert and oriented to person, place, and time. Mental status is at baseline.   Psychiatric:         Mood and Affect: Mood normal.         Behavior: Behavior normal.         Thought Content: Thought content normal.           Labs:  CBC:   Lab Results   Component Value Date    WBC 4.16 (L) 03/26/2024    RBC 3.90 03/26/2024    HGB 11.8 03/26/2024    HCT 37.5 03/26/2024    MCV 96 03/26/2024     03/26/2024    RDW 14.2 03/26/2024       CMP:   Lab Results   Component Value Date    K 4.8 05/06/2024     05/06/2024    CO2 28 05/06/2024    BUN 18 05/06/2024    CREATININE 1.07 05/06/2024    EGFR 49 05/06/2024    GLUCOSE 98 11/01/2023    CALCIUM 9.6 05/06/2024    AST 22 05/06/2024    ALT 14 05/06/2024    ALKPHOS 75 05/06/2024       Magnesium:  Lab Results   Component Value Date    MG 2.4 03/26/2024       Lipid Profile:   Lab Results   Component Value Date    HDL 69 03/26/2024    TRIG 123 03/26/2024    LDLCALC 125 (H) 03/26/2024       Thyroid Studies:   Lab Results   Component Value Date    IDE2FYLSXLQV 2.158 03/26/2024       A1c:  No components found for: \"HGA1C\"    INR:  No results found for: \"INR\"5    Cardiac testing:   No results found for this or any previous visit.    No results found for this or any previous visit.    No results found for this or any previous visit.    No results found for this or any previous visit.      Cardiac EP device report  MDT DC PM/ACTIVE SYSTEM IS MRI CONDITIONAL  DEVICE INTERROGATED IN THE [place] OFFICE: BATTERY VOLTAGE ADEQUATE (12.7 YRS).  AP 78.2%  1.5%. ALL LEAD PARAMETERS WITHIN NORMAL LIMITS. SINCE 7/3/2023, 10 DEVICE CLASSIFIED VT EPISODES. 118 SVT-AF, & 93 AT AF EPISODES W/LONGEST >24 HRS; AVG VENT -200 BPM, LONGEST EPISODE >24 HRS, & AT AF BURDEN 13.4%.   PT TAKES XARELTO, FLECAINIDE, DILTIAZEM, CARVEDILOL.  EF 60% (ECHO 3/2024). TASK TO DR MAURA LUCAS PROGRAMMING " CHANGES MADE TO DEVICE PARAMETERS. NORMAL DEVICE FUNCTION. PAS/RG

## 2024-07-01 ENCOUNTER — CLINICAL SUPPORT (OUTPATIENT)
Dept: FAMILY MEDICINE CLINIC | Facility: CLINIC | Age: 79
End: 2024-07-01
Payer: COMMERCIAL

## 2024-07-01 DIAGNOSIS — Z23 IMMUNIZATION DUE: Primary | ICD-10-CM

## 2024-07-01 DIAGNOSIS — M81.0 AGE-RELATED OSTEOPOROSIS WITHOUT CURRENT PATHOLOGICAL FRACTURE: Primary | ICD-10-CM

## 2024-07-01 DIAGNOSIS — M81.0 AGE-RELATED OSTEOPOROSIS WITHOUT CURRENT PATHOLOGICAL FRACTURE: ICD-10-CM

## 2024-07-01 PROCEDURE — 96372 THER/PROPH/DIAG INJ SC/IM: CPT | Performed by: FAMILY MEDICINE

## 2024-07-11 DIAGNOSIS — I48.0 PAROXYSMAL ATRIAL FIBRILLATION (HCC): ICD-10-CM

## 2024-07-11 DIAGNOSIS — E78.5 HYPERLIPIDEMIA, UNSPECIFIED HYPERLIPIDEMIA TYPE: ICD-10-CM

## 2024-07-11 RX ORDER — EZETIMIBE 10 MG/1
10 TABLET ORAL DAILY
Qty: 100 TABLET | Refills: 1 | Status: SHIPPED | OUTPATIENT
Start: 2024-07-11

## 2024-07-11 RX ORDER — FLECAINIDE ACETATE 100 MG/1
100 TABLET ORAL 2 TIMES DAILY
Qty: 180 TABLET | Refills: 2 | Status: SHIPPED | OUTPATIENT
Start: 2024-07-11

## 2024-07-26 ENCOUNTER — RA CDI HCC (OUTPATIENT)
Dept: OTHER | Facility: HOSPITAL | Age: 79
End: 2024-07-26

## 2024-08-02 ENCOUNTER — REMOTE DEVICE CLINIC VISIT (OUTPATIENT)
Dept: CARDIOLOGY CLINIC | Facility: CLINIC | Age: 79
End: 2024-08-02
Payer: COMMERCIAL

## 2024-08-02 ENCOUNTER — CLINICAL SUPPORT (OUTPATIENT)
Dept: FAMILY MEDICINE CLINIC | Facility: CLINIC | Age: 79
End: 2024-08-02
Payer: COMMERCIAL

## 2024-08-02 DIAGNOSIS — M81.0 AGE-RELATED OSTEOPOROSIS WITHOUT CURRENT PATHOLOGICAL FRACTURE: Primary | ICD-10-CM

## 2024-08-02 DIAGNOSIS — S82.045A CLOSED NONDISPLACED COMMINUTED FRACTURE OF LEFT PATELLA, INITIAL ENCOUNTER: ICD-10-CM

## 2024-08-02 DIAGNOSIS — Z95.0 CARDIAC PACEMAKER IN SITU: Primary | ICD-10-CM

## 2024-08-02 PROCEDURE — 93296 REM INTERROG EVL PM/IDS: CPT | Performed by: INTERNAL MEDICINE

## 2024-08-02 PROCEDURE — 96372 THER/PROPH/DIAG INJ SC/IM: CPT | Performed by: FAMILY MEDICINE

## 2024-08-02 PROCEDURE — 93294 REM INTERROG EVL PM/LDLS PM: CPT | Performed by: INTERNAL MEDICINE

## 2024-08-02 NOTE — PROGRESS NOTES
Results for orders placed or performed in visit on 08/02/24   Cardiac EP device report    Narrative    MDT DC PM/ACTIVE SYSTEM IS MRI CONDITIONAL  CARELINK TRANSMISSION: BATTERY VOLTAGE ADEQUATE (12.5 YRS). AP-40%, -2%. ALL AVAILABLE LEAD PARAMETERS WITHIN NORMAL LIMITS. 22 DEVICE CLASSIFIED NSVT EPISODES- SVT ON EGM'S. 9 FAST A&V EPISODES @ 130-158 BPM MAX DURATION 1.75 MINS- SVT ON EGM'S. 90 AT/AF EPISODES MAX DURATION 8.8 HRS. AT/AF BURDEN-18.3%. HX: PAF & ON XARELTO, FLECAINIDE & CARVEDILOL. NORMAL DEVICE FUNCTION.  GV

## 2024-09-04 ENCOUNTER — CLINICAL SUPPORT (OUTPATIENT)
Dept: FAMILY MEDICINE CLINIC | Facility: CLINIC | Age: 79
End: 2024-09-04
Payer: COMMERCIAL

## 2024-09-04 DIAGNOSIS — M81.0 AGE-RELATED OSTEOPOROSIS WITHOUT CURRENT PATHOLOGICAL FRACTURE: Primary | ICD-10-CM

## 2024-09-04 DIAGNOSIS — S82.045A CLOSED NONDISPLACED COMMINUTED FRACTURE OF LEFT PATELLA, INITIAL ENCOUNTER: ICD-10-CM

## 2024-09-04 PROCEDURE — 96372 THER/PROPH/DIAG INJ SC/IM: CPT | Performed by: FAMILY MEDICINE

## 2024-09-06 DIAGNOSIS — E03.4 HYPOTHYROIDISM DUE TO ACQUIRED ATROPHY OF THYROID: ICD-10-CM

## 2024-09-06 RX ORDER — THYROID 60 MG/1
60 TABLET ORAL DAILY
Qty: 90 TABLET | Refills: 0 | Status: SHIPPED | OUTPATIENT
Start: 2024-09-06

## 2024-09-06 NOTE — TELEPHONE ENCOUNTER
Reason for call:   [x] Refill   [] Prior Auth  [] Other:     Office:   [x] PCP/Provider - [facility]   [] Specialty/Provider -     Medication: thyroid (ARMOUR) 60 MG tablet     Dose/Frequency: 60 mg, Daily     Quantity: 90    [id number]    Does the patient have enough for 3 days?   [x] Yes   [] No - Send as HP to POD

## 2024-09-07 ENCOUNTER — TELEPHONE (OUTPATIENT)
Age: 79
End: 2024-09-07

## 2024-09-08 NOTE — TELEPHONE ENCOUNTER
PA for thyroid 60mgSUBMITTED     via    [x]CMM-KEY: [id number]  []Surescripts-Case ID #   []Faxed to plan   []Other website   []Phone call Case ID #     Office notes sent, clinical questions answered. Awaiting determination    Turnaround time for your insurance to make a decision on your Prior Authorization can take 7-21 business days.

## 2024-09-11 ENCOUNTER — TELEPHONE (OUTPATIENT)
Dept: FAMILY MEDICINE CLINIC | Facility: CLINIC | Age: 79
End: 2024-09-11

## 2024-09-11 DIAGNOSIS — S82.045A CLOSED NONDISPLACED COMMINUTED FRACTURE OF LEFT PATELLA, INITIAL ENCOUNTER: ICD-10-CM

## 2024-09-11 DIAGNOSIS — S02.2XXA CLOSED FRACTURE OF NASAL BONE, INITIAL ENCOUNTER: ICD-10-CM

## 2024-09-11 NOTE — TELEPHONE ENCOUNTER
Patient called requesting refill for thyroid (ARMOUR . Patient made aware medication was refilled on 9/6/24 for 90 tablets with 0 refills to [facility] pharmacy. Patient instructed to contact the pharmacy to obtain refills of medication. Patient verbalized understanding.

## 2024-09-16 RX ORDER — METHOCARBAMOL 500 MG/1
500 TABLET, FILM COATED ORAL 4 TIMES DAILY
Qty: 65 TABLET | Refills: 0 | OUTPATIENT
Start: 2024-09-16

## 2024-09-27 ENCOUNTER — RA CDI HCC (OUTPATIENT)
Dept: OTHER | Facility: HOSPITAL | Age: 79
End: 2024-09-27

## 2024-10-04 ENCOUNTER — OFFICE VISIT (OUTPATIENT)
Dept: FAMILY MEDICINE CLINIC | Facility: CLINIC | Age: 79
End: 2024-10-04
Payer: COMMERCIAL

## 2024-10-04 VITALS
HEART RATE: 76 BPM | SYSTOLIC BLOOD PRESSURE: 154 MMHG | WEIGHT: 137 LBS | TEMPERATURE: 97.4 F | OXYGEN SATURATION: 97 % | BODY MASS INDEX: 23.39 KG/M2 | DIASTOLIC BLOOD PRESSURE: 86 MMHG | HEIGHT: 64 IN

## 2024-10-04 DIAGNOSIS — N18.30 STAGE 3 CHRONIC KIDNEY DISEASE, UNSPECIFIED WHETHER STAGE 3A OR 3B CKD (HCC): ICD-10-CM

## 2024-10-04 DIAGNOSIS — Z00.00 WELL ADULT EXAM: Primary | ICD-10-CM

## 2024-10-04 DIAGNOSIS — E78.00 PURE HYPERCHOLESTEROLEMIA: ICD-10-CM

## 2024-10-04 DIAGNOSIS — M81.0 AGE-RELATED OSTEOPOROSIS WITHOUT CURRENT PATHOLOGICAL FRACTURE: ICD-10-CM

## 2024-10-04 DIAGNOSIS — I48.0 PAROXYSMAL ATRIAL FIBRILLATION (HCC): ICD-10-CM

## 2024-10-04 PROCEDURE — G0439 PPPS, SUBSEQ VISIT: HCPCS

## 2024-10-04 PROCEDURE — 99214 OFFICE O/P EST MOD 30 MIN: CPT

## 2024-10-04 PROCEDURE — 96372 THER/PROPH/DIAG INJ SC/IM: CPT

## 2024-10-04 RX ORDER — CARVEDILOL 12.5 MG/1
12.5 TABLET ORAL 2 TIMES DAILY WITH MEALS
Start: 2024-10-04

## 2024-10-04 NOTE — ASSESSMENT & PLAN NOTE
Patient to continue with weight bearing exercises as much as possible and oral intake of 1200 mg of calcium with 800 IU of Vit D to maximize bone protection. Pt  to continue  with DEXA scan every 2 years to reassess. All qustions regarding this problem answered today to patient satisfaction.

## 2024-10-04 NOTE — ASSESSMENT & PLAN NOTE
Lab Results   Component Value Date    EGFR 49 05/06/2024    EGFR 48 03/26/2024    EGFR 54 03/09/2024    CREATININE 1.07 05/06/2024    CREATININE 1.10 03/26/2024    CREATININE 0.99 03/09/2024   Pt to avoid NSAIDs and any IV dyes. Patient to follow up eoither with nephrology or  with us for  further  monitoring of  renal function.

## 2024-10-04 NOTE — PROGRESS NOTES
Ambulatory Visit  Name: Natlai Hernandez      : 1945      MRN: [id number]  Encounter Provider: Waqar Hart MD  Encounter Date: 10/4/2024   Encounter department: [facility]    Assessment & Plan  Well adult exam         Stage 3 chronic kidney disease, unspecified whether stage 3a or 3b CKD (HCC)  Lab Results   Component Value Date    EGFR 49 2024    EGFR 48 2024    EGFR 54 2024    CREATININE 1.07 2024    CREATININE 1.10 2024    CREATININE 0.99 2024   Pt to avoid NSAIDs and any IV dyes. Patient to follow up eoither with nephrology or  with us for  further  monitoring of  renal function.          Pure hypercholesterolemia  Patient  is stable with current medication and we discussed a low fat low cholesterol diet. Weight loss also discussed for this will help lower cholesterol also. Recheck lipids in 6 months.          Age-related osteoporosis without current pathological fracture  Patient to continue with weight bearing exercises as much as possible and oral intake of 1200 mg of calcium with 800 IU of Vit D to maximize bone protection. Pt  to continue  with DEXA scan every 2 years to reassess. All qustions regarding this problem answered today to patient satisfaction.            Urinary Incontinence Plan of Care: counseling topics discussed: practice Kegel (pelvic floor strengthening) exercises, use restroom every 2 hours, limit alcohol, caffeine, spicy foods, and acidic foods, limiting fluid intake 3-4 hours before bed, weight loss, preventing constipation and limiting fluid intake to 60 oz. per day.   History of Present Illness     HPI      Review of Systems        Objective     There were no vitals taken for this visit.    Physical Exam

## 2024-10-04 NOTE — ASSESSMENT & PLAN NOTE
Lab Results   Component Value Date    EGFR 49 05/06/2024    EGFR 48 03/26/2024    EGFR 54 03/09/2024    CREATININE 1.07 05/06/2024    CREATININE 1.10 03/26/2024    CREATININE 0.99 03/09/2024

## 2024-10-04 NOTE — PROGRESS NOTES
Ambulatory Visit  Name: Natali Hernandez      : 1945      MRN: [id number]  Encounter Provider: Waqar Hart MD  Encounter Date: 10/4/2024   Encounter department: [facility]    Assessment & Plan  Well adult exam         Stage 3 chronic kidney disease, unspecified whether stage 3a or 3b CKD (HCC)  Lab Results   Component Value Date    EGFR 49 2024    EGFR 48 2024    EGFR 54 2024    CREATININE 1.07 2024    CREATININE 1.10 2024    CREATININE 0.99 2024            Pure hypercholesterolemia         Age-related osteoporosis without current pathological fracture         Paroxysmal atrial fibrillation (HCC)            Preventive health issues were discussed with patient, and age appropriate screening tests were ordered as noted in patient's After Visit Summary. Personalized health advice and appropriate referrals for health education or preventive services given if needed, as noted in patient's After Visit Summary.    History of Present Illness     Is a 78-year-old female here today for checkup on multimedical problems as well as her Medicare wellness.  Patient with history of A-fib also CKD stage III osteoporosis and hyperlipidemia.  The patient's blood pressure is elevated today she is taking Coreg twice a day working to see about increasing to 6.25 mg twice a day.  We will check her back in 2 weeks to check her blood pressure.  Patient is a blood pressure cuff at home and she will check her blood pressure and bring her data into show will see what her blood pressure is doing at home also.       Patient Care Team:  Waqar Hart MD as PCP - General (Family Medicine)    Review of Systems   Constitutional:  Negative for activity change, appetite change, fatigue and fever.   HENT:  Negative for congestion, ear pain, postnasal drip, rhinorrhea, sinus pressure, sinus pain, sneezing and sore throat.    Eyes:  Negative for pain and redness.   Respiratory:   Negative for apnea, cough, chest tightness, shortness of breath and wheezing.    Cardiovascular:  Negative for chest pain, palpitations and leg swelling.   Gastrointestinal:  Negative for abdominal pain, constipation, diarrhea, nausea and vomiting.   Endocrine: Negative for cold intolerance and heat intolerance.   Genitourinary:  Negative for difficulty urinating, dysuria, frequency, hematuria and urgency.   Musculoskeletal:  Negative for arthralgias, back pain, gait problem and myalgias.   Skin:  Negative for rash.   Neurological:  Negative for dizziness, speech difficulty, weakness, numbness and headaches.   Hematological:  Does not bruise/bleed easily.   Psychiatric/Behavioral:  Negative for agitation, confusion and hallucinations.      Medical History Reviewed by provider this encounter:       Annual Wellness Visit Questionnaire       Health Risk Assessment:   Patient rates overall health as good. Patient feels that their physical health rating is much better. Patient is very satisfied with their life. Eyesight was rated as same. Hearing was rated as same. Patient feels that their emotional and mental health rating is same. Patients states they are never, rarely angry. Patient states they are sometimes unusually tired/fatigued. Pain experienced in the last 7 days has been none. Patient states that she has experienced weight loss or gain in last 6 months.     Depression Screening:   PHQ-2 Score: 0      Fall Risk Screening:   In the past year, patient has experienced: no history of falling in past year      Urinary Incontinence Screening:   Patient has not leaked urine accidently in the last six months.     Home Safety:  Patient has trouble with stairs inside or outside of their home. Patient has working smoke alarms and has working carbon monoxide detector. Home safety hazards include: none.     Nutrition:   Current diet is Limited junk food.     Medications:   Patient is currently taking over-the-counter  supplements. OTC medications include: see medication list. Patient is able to manage medications.     Activities of Daily Living (ADLs)/Instrumental Activities of Daily Living (IADLs):   Walk and transfer into and out of bed and chair?: Yes  Dress and groom yourself?: Yes    Bathe or shower yourself?: Yes    Feed yourself? Yes  Do your laundry/housekeeping?: Yes  Manage your money, pay your bills and track your expenses?: Yes  Make your own meals?: Yes    Do your own shopping?: Yes    Previous Hospitalizations:   Any hospitalizations or ED visits within the last 12 months?: No      Advance Care Planning:   Living will: Yes    Durable POA for healthcare: No    Advanced directive: Yes      PREVENTIVE SCREENINGS      Cardiovascular Screening:    General: Screening Not Indicated and History Lipid Disorder      Diabetes Screening:     General: Screening Current      Breast Cancer Screening:     General: Screening Current      Cervical Cancer Screening:    General: Screening Not Indicated      Osteoporosis Screening:    General: Screening Not Indicated and History Osteoporosis      Lung Cancer Screening:     General: Screening Not Indicated    Screening, Brief Intervention, and Referral to Treatment (SBIRT)    Screening  Typical number of drinks in a day: 0  Typical number of drinks in a week: 0  Interpretation: Low risk drinking behavior.    Single Item Drug Screening:  How often have you used an illegal drug (including marijuana) or a prescription medication for non-medical reasons in the past year? never    Single Item Drug Screen Score: 0  Interpretation: Negative screen for possible drug use disorder       No results found.    Objective     There were no vitals taken for this visit.    Physical Exam  Constitutional:       Appearance: Normal appearance. She is not ill-appearing.   HENT:      Head: Normocephalic and atraumatic.      Right Ear: Tympanic membrane normal.      Left Ear: Tympanic membrane normal.       Nose: Nose normal.      Mouth/Throat:      Mouth: Mucous membranes are moist.   Eyes:      Extraocular Movements: Extraocular movements intact.      Conjunctiva/sclera: Conjunctivae normal.      Pupils: Pupils are equal, round, and reactive to light.   Cardiovascular:      Rate and Rhythm: Normal rate and regular rhythm.   Pulmonary:      Effort: Pulmonary effort is normal. No respiratory distress.      Breath sounds: Normal breath sounds. No wheezing.   Abdominal:      General: Bowel sounds are normal.      Palpations: Abdomen is soft.      Tenderness: There is no abdominal tenderness.   Musculoskeletal:         General: No tenderness. Normal range of motion.      Cervical back: Normal range of motion and neck supple.      Right lower leg: No edema.      Left lower leg: No edema.   Skin:     General: Skin is warm and dry.   Neurological:      Mental Status: She is alert and oriented to person, place, and time.   Psychiatric:         Mood and Affect: Mood normal.         Behavior: Behavior normal.         Thought Content: Thought content normal.         Judgment: Judgment normal.

## 2024-10-18 ENCOUNTER — OFFICE VISIT (OUTPATIENT)
Dept: FAMILY MEDICINE CLINIC | Facility: CLINIC | Age: 79
End: 2024-10-18
Payer: COMMERCIAL

## 2024-10-18 VITALS
HEART RATE: 81 BPM | DIASTOLIC BLOOD PRESSURE: 80 MMHG | WEIGHT: 139 LBS | TEMPERATURE: 98.6 F | BODY MASS INDEX: 23.73 KG/M2 | OXYGEN SATURATION: 98 % | SYSTOLIC BLOOD PRESSURE: 126 MMHG | HEIGHT: 64 IN

## 2024-10-18 DIAGNOSIS — M81.0 AGE-RELATED OSTEOPOROSIS WITHOUT CURRENT PATHOLOGICAL FRACTURE: Primary | ICD-10-CM

## 2024-10-18 DIAGNOSIS — N18.30 STAGE 3 CHRONIC KIDNEY DISEASE, UNSPECIFIED WHETHER STAGE 3A OR 3B CKD (HCC): ICD-10-CM

## 2024-10-18 DIAGNOSIS — N32.81 OAB (OVERACTIVE BLADDER): ICD-10-CM

## 2024-10-18 DIAGNOSIS — E78.00 PURE HYPERCHOLESTEROLEMIA: ICD-10-CM

## 2024-10-18 PROCEDURE — 99214 OFFICE O/P EST MOD 30 MIN: CPT | Performed by: FAMILY MEDICINE

## 2024-10-18 PROCEDURE — G2211 COMPLEX E/M VISIT ADD ON: HCPCS | Performed by: FAMILY MEDICINE

## 2024-10-18 RX ORDER — SOLIFENACIN SUCCINATE 10 MG/1
10 TABLET, FILM COATED ORAL DAILY
Qty: 30 TABLET | Refills: 5 | Status: SHIPPED | OUTPATIENT
Start: 2024-10-18

## 2024-10-18 NOTE — PROGRESS NOTES
Ambulatory Visit  Name: Natali Hernandez      : 1945      MRN: [id number]  Encounter Provider: Waqar Hart MD  Encounter Date: 10/18/2024   Encounter department: [facility]    Assessment & Plan  Age-related osteoporosis without current pathological fracture  Patient to continue with weight bearing exercises as much as possible and oral intake of 1200 mg of calcium with 800 IU of Vit D to maximize bone protection. Pt  to continue  with DEXA scan every 2 years to reassess. All qustions regarding this problem answered today to patient satisfaction.          Pure hypercholesterolemia  Patient  is stable with current medication and we discussed a low fat low cholesterol diet. Weight loss also discussed for this will help lower cholesterol also. Recheck lipids in 6 months.          Stage 3 chronic kidney disease, unspecified whether stage 3a or 3b CKD (HCC)  Lab Results   Component Value Date    EGFR 49 2024    EGFR 48 2024    EGFR 54 2024    CREATININE 1.07 2024    CREATININE 1.10 2024    CREATININE 0.99 2024   Pt to avoid NSAIDs and any IV dyes. Patient to follow up eoither with nephrology or  with us for  further  monitoring of  renal function.          OAB (overactive bladder)    Orders:    solifenacin (VESICARE) 10 MG tablet; Take 1 tablet (10 mg total) by mouth daily      Urinary Incontinence Plan of Care: counseling topics discussed: practice Kegel (pelvic floor strengthening) exercises, use restroom every 2 hours, limit alcohol, caffeine, spicy foods, and acidic foods, limiting fluid intake 3-4 hours before bed, weight loss, preventing constipation and limiting fluid intake to 60 oz. per day.     History of Present Illness     70-year-old female here today for checkup on A-fib osteoporosis as well as hypertension.  Patient is here with her blood pressure readings from her home which show blood pressure readings are low but higher than what  we are getting here in the office she does have a blood pressure machine at home that we are going to see about rechecking her accuracy by checking her blood pressure against our blood pressure cuff.  I will be seeing patient next week and I will check her blood pressure at her apartment and they are going to check it against her blood pressure machine she has to check its accuracy.  Patient blood pressure today is 126/80 patient will continue to watch her salt in her diet and we will continue with her Coreg 12.5 mg twice a day.  Will recheck her back in approximately 1 week and a house visit to reassess her blood pressure and the accuracy of her blood pressure machine.  Patient also is having a little bit of problems with urinary leakage of from her bladder and would like to try something but she over the [place] [facility] that she can try and we will reassess her at her next visit.          Review of Systems        Objective     There were no vitals taken for this visit.    Physical Exam

## 2024-10-30 ENCOUNTER — TELEPHONE (OUTPATIENT)
Age: 79
End: 2024-10-30

## 2024-10-30 NOTE — TELEPHONE ENCOUNTER
Pt called for an appt stating PCP increased BP med's, and seems to be feeling better BP has improved. PCP to return to home for another BP check.   Pt also having breakthrough episodes of Afib. Pt takes an extra Flecainide and/or Carvedilol and returns to [facility].   Pt experiencing increased lightheadedness on the increased dose of medication and would like to see Dr Major. Appt scheduled for 11/14/24

## 2024-11-01 ENCOUNTER — REMOTE DEVICE CLINIC VISIT (OUTPATIENT)
Dept: CARDIOLOGY CLINIC | Facility: CLINIC | Age: 79
End: 2024-11-01
Payer: COMMERCIAL

## 2024-11-01 DIAGNOSIS — Z95.0 CARDIAC PACEMAKER IN SITU: Primary | ICD-10-CM

## 2024-11-01 PROCEDURE — 93296 REM INTERROG EVL PM/IDS: CPT | Performed by: INTERNAL MEDICINE

## 2024-11-01 PROCEDURE — 93294 REM INTERROG EVL PM/LDLS PM: CPT | Performed by: INTERNAL MEDICINE

## 2024-11-01 NOTE — PROGRESS NOTES
"Results for orders placed or performed in visit on 11/01/24   Cardiac EP device report    Narrative    MDT DC PM/ACTIVE SYSTEM IS MRI CONDITIONAL  CARELINK TRANSMISSION: BATTERY STATUS \"12 YRS.\" AP 72%  0%. ALL AVAILABLE LEAD PARAMETERS WITHIN NORMAL LIMITS. 16 FAST A/V NOTED. 118 AT/AF NOTED; 6% BURDEN. PT ON XARELTO & COREG. AVAIL EGRAMS PRESENT AS AF, SVT, & RVR. EF 60% (ECHO 2024). NORMAL DEVICE FUNCTION. NC         "

## 2024-11-03 PROBLEM — I10 PRIMARY HYPERTENSION: Status: ACTIVE | Noted: 2024-11-03

## 2024-11-03 NOTE — PROGRESS NOTES
Ambulatory Visit  Name: Natali Hernandez      : 1945      MRN: [id number]  Encounter Provider: Waqar Hart MD  Encounter Date: 2024   Encounter department: [facility]    Assessment & Plan  Stage 3 chronic kidney disease, unspecified whether stage 3a or 3b CKD (HCC)  Lab Results   Component Value Date    EGFR 49 2024    EGFR 48 2024    EGFR 54 2024    CREATININE 1.07 2024    CREATININE 1.10 2024    CREATININE 0.99 2024   Pt to avoid NSAIDs and any IV dyes. Patient to follow up eoither with nephrology or  with us for  further  monitoring of  renal function.          Pure hypercholesterolemia  Patient  is stable with current medication and we discussed a low fat low cholesterol diet. Weight loss also discussed for this will help lower cholesterol also. Recheck lipids in 6 months.          Age-related osteoporosis without current pathological fracture  Patient to continue with weight bearing exercises as much as possible and oral intake of 1200 mg of calcium with 800 IU of Vit D to maximize bone protection. Pt  to continue  with DEXA scan every 2 years to reassess. All qustions regarding this problem answered today to patient satisfaction.     Orders:    romosozumab-aqqg (EVENITY) subcutaneous injection 210 mg    Primary hypertension  Patient is stable with current anti-hypertensive medicine and continue to follow a low sodium diet and take current medication. All questions about this condition were answered today.            Urinary Incontinence Plan of Care: counseling topics discussed: practice Kegel (pelvic floor strengthening) exercises, use restroom every 2 hours, limit alcohol, caffeine, spicy foods, and acidic foods, limiting fluid intake 3-4 hours before bed, weight loss, preventing constipation and limiting fluid intake to 60 oz. per day.     History of Present Illness     HPI      Review of Systems        Objective     There were  no vitals taken for this visit.    Physical Exam

## 2024-11-03 NOTE — ASSESSMENT & PLAN NOTE
Patient to continue with weight bearing exercises as much as possible and oral intake of 1200 mg of calcium with 800 IU of Vit D to maximize bone protection. Pt  to continue  with DEXA scan every 2 years to reassess. All qustions regarding this problem answered today to patient satisfaction.     Orders:    romosozumab-aqqg (EVENITY) subcutaneous injection 210 mg

## 2024-11-06 ENCOUNTER — OFFICE VISIT (OUTPATIENT)
Dept: FAMILY MEDICINE CLINIC | Facility: CLINIC | Age: 79
End: 2024-11-06
Payer: COMMERCIAL

## 2024-11-06 VITALS
RESPIRATION RATE: 18 BRPM | DIASTOLIC BLOOD PRESSURE: 76 MMHG | WEIGHT: 138.8 LBS | SYSTOLIC BLOOD PRESSURE: 124 MMHG | TEMPERATURE: 97.5 F | BODY MASS INDEX: 23.7 KG/M2 | HEART RATE: 74 BPM | OXYGEN SATURATION: 98 % | HEIGHT: 64 IN

## 2024-11-06 DIAGNOSIS — M81.0 AGE-RELATED OSTEOPOROSIS WITHOUT CURRENT PATHOLOGICAL FRACTURE: ICD-10-CM

## 2024-11-06 DIAGNOSIS — I10 PRIMARY HYPERTENSION: ICD-10-CM

## 2024-11-06 DIAGNOSIS — E78.00 PURE HYPERCHOLESTEROLEMIA: ICD-10-CM

## 2024-11-06 DIAGNOSIS — N18.30 STAGE 3 CHRONIC KIDNEY DISEASE, UNSPECIFIED WHETHER STAGE 3A OR 3B CKD (HCC): Primary | ICD-10-CM

## 2024-11-06 PROCEDURE — 96372 THER/PROPH/DIAG INJ SC/IM: CPT | Performed by: FAMILY MEDICINE

## 2024-11-09 DIAGNOSIS — N32.81 OAB (OVERACTIVE BLADDER): ICD-10-CM

## 2024-11-09 RX ORDER — SOLIFENACIN SUCCINATE 10 MG/1
10 TABLET, FILM COATED ORAL DAILY
Qty: 90 TABLET | Refills: 1 | Status: SHIPPED | OUTPATIENT
Start: 2024-11-09

## 2024-11-14 ENCOUNTER — OFFICE VISIT (OUTPATIENT)
Dept: CARDIOLOGY CLINIC | Facility: MEDICAL CENTER | Age: 79
End: 2024-11-14
Payer: COMMERCIAL

## 2024-11-14 VITALS
DIASTOLIC BLOOD PRESSURE: 80 MMHG | HEART RATE: 81 BPM | SYSTOLIC BLOOD PRESSURE: 122 MMHG | BODY MASS INDEX: 23.39 KG/M2 | WEIGHT: 137 LBS | OXYGEN SATURATION: 100 % | HEIGHT: 64 IN

## 2024-11-14 DIAGNOSIS — Z95.0 CARDIAC PACEMAKER IN SITU: ICD-10-CM

## 2024-11-14 DIAGNOSIS — I48.0 PAROXYSMAL ATRIAL FIBRILLATION (HCC): Primary | ICD-10-CM

## 2024-11-14 DIAGNOSIS — R42 DIZZINESS: ICD-10-CM

## 2024-11-14 DIAGNOSIS — E78.5 HYPERLIPIDEMIA, UNSPECIFIED HYPERLIPIDEMIA TYPE: ICD-10-CM

## 2024-11-14 DIAGNOSIS — E03.9 HYPOTHYROIDISM, UNSPECIFIED TYPE: ICD-10-CM

## 2024-11-14 PROCEDURE — 99214 OFFICE O/P EST MOD 30 MIN: CPT | Performed by: INTERNAL MEDICINE

## 2024-11-14 RX ORDER — CARVEDILOL 6.25 MG/1
6.25 TABLET ORAL 2 TIMES DAILY WITH MEALS
Start: 2024-11-14 | End: 2024-11-14

## 2024-11-14 RX ORDER — CARVEDILOL 3.12 MG/1
3.12 TABLET ORAL 2 TIMES DAILY WITH MEALS
Qty: 60 TABLET | Refills: 3 | Status: SHIPPED | OUTPATIENT
Start: 2024-11-14

## 2024-11-14 RX ORDER — FLECAINIDE ACETATE 150 MG/1
150 TABLET ORAL 2 TIMES DAILY
Qty: 60 TABLET | Refills: 3 | Status: SHIPPED | OUTPATIENT
Start: 2024-11-14

## 2024-11-14 NOTE — PROGRESS NOTES
[facility] [place]   [address]  [address]  [facility] [address]                                            Cardiology Office Follow up  Natali Hernandez, 78 y.o. female  YOB: 1945  MRN: [id number] Encounter: [id number]      PCP - Waqar Hart MD  Referring Provider - No ref. provider found    Chief Complaint   Patient presents with    Follow-up     6 month f/up       Assessment  Cardiac dual chamber pacemaker in-situ (Medtronic)  Implanted: 4/10/23 in [place]  4/14/23: tiny apical left penumothorax  Paroxysmal Atrial Fibrillation  3/2024 TTE -LVEF 60%, mild AS, mild AI, mild-moderate MR, moderate TR, mild pulmonary hypertension  Aortic stenosis  Dizziness  Hypertension  Hypothyroidism  On armour thyroid  Osteoporosis    Plan  Dizziness, Cardiac dual chamber pacemaker in-situ (Medtronic)  Still in the process of getting established with device clinic, has an appointment for device check next week --> follow up on same to see if she is still in Afib after initiating flecainide, and if there were any prior events that could have caused her fall    Paroxysmal Atrial Fibrillation, dizziness  Overview  3/8/24 - initial OV with me  Currently in sinus rhythm, on carvedilol 3.125 mg twice daily, Cardizem  mg daily  She feels she has dizziness since starting on these medications  BP, HR normal and recent PPM implant --> unclear etiology  We will get device check and assess, if no significant A-fib then can consider discontinuing carvedilol  4/25/24 - device check still pending.  Still has on and off dizziness.    Today she is actually in A-fib with heart rate in 100-110  Started on flecainide 50 mg bid  6/2024: Reports intermittent palpitations, for which she takes extra tablet of carvedilol and flecainide on her own and it seems to work for her  Diltiazem discontinued due to patient concerns about dizziness and fall  Flecainide increased to 100 mg twice daily  11/2024: Returns  for follow-up, continues to have low burden A-fib on pacemaker checks , and is also having issues with dizziness and near syncope on and off similar to prior  Impression  A-fib burden overall appears to be improvin.4% --> 6%  She still having dizziness despite this, and is unclear if the dizziness is associate with the A-fib or more related to low blood pressure/orthostasis   Plan  Increase flecainide to 150 mg twice daily for better A-fib control  Decrease carvedilol to 3.125 mg twice daily a lot better blood pressure at home   Continue Xarelto 20 mg daily  I have asked her to call our office to get device check done at that time of severe dizziness or near syncopal episodes, so that we can clarify if this is truly related to A-fib and try to keep log of times  If A-fib is truly causing her to get dizzy then she will benefit from EP evaluation to consider ablation -> will provide referral to see EP to help with this    Dizziness, Cardiac pacemaker in-situ (Medtronic)  Overview  2023: PPM implanted  in [place] for Afib/SSS  No apparent issues with pacemaker thereafter, but she has had 2 falls in the interim  Reviewed device checks   2024  AP 72%  0%, 16 fast AV noted, mild AT/AF episodes noted, 6% burden (decreased from prior 18.3% burden), normal device function  24 -reports ongoing dizziness concerns, PCP tried to increase the dose of carvedilol to 12.5 mg twice daily earlier and after that she had increased dizziness, causing her to decrease carvedilol back down to 6.25 mg twice daily  Impression  Normal device function, A-fib burden reducing, unclear etiology of dizziness - ?  Related to orthostasis  Plan  Continue to monitor with pacemaker checks and try to obtain correlation with the rhythm at the time of these events    Dizziness, near-syncope  Has now been ongoing for several months, since before pacemaker implant in [place]   Originally was felt to have tacky bradycardia prompting the  pacemaker implant, but even after this dizziness has not really resolved, continues to occur intermittently  She did had increasing dizziness with uptitration of carvedilol suggesting possible orthostasis/hypotension component, but since she is also having A-fib episodes it is unclear without true correlation  Plan  Maintain good hydration  Notify office for device check at the time of next episode, for timing correlation  Decrease carvedilol to 3.125 mg twice daily to allow blood pressure room to potentially help avoid future episodes  Goal blood pressure 120-150s/80-95  Avoid lower BP    Aortic stenosis - mild, mitral regurgitation, TR - moderate, PHTN  Overview  3/2024 TTE - mild aortic stenosis and mild to moderate MR/TR, mild PHTN  4/2024: No gross volume overload or shortness of breath  6/2024: no shortness of breath      Hypertension  Overview  6/2024: Cardizem being discontinued due to patient concerns about dizziness with it  I believe her BP could possibly be getting low intermittently with the combination of carvedilol and Cardizem, which may be the cause of her dizziness  10/4/24 - saw  - BP up to 154/86 --> carvedilol increased to 12.5 mg bid  Within a few days, she was feeling more dizzy after she made additional dietary changes --> self decreased carvedilol to 6.25 mg bid  11/14/24 - /80 despite only taking carvedilol 6.4 mg twice daily  still reports having ongoing dizziness  Plan  Decrease carvedilol to 3.125 mg twice daily   keep higher goal BP, at 120-150 systolic/80-95 diastolic  Avoid hypotension    Hyperlipidemia    She tried low-dose rosuvastatin 5 mg, but was intolerant - reports she could not walk due to muscle issues and as a result has stopped it  6/2024: We will switch to alternative medication with ezetimibe 10 mg daily  11/2024: Tolerating a statin without any problems  Check repeat lipid panel, CMP    No results found for this visit on 11/14/24.        Orders Placed  This Encounter   Procedures    Comprehensive metabolic panel    Lipid Panel with Direct LDL reflex    Ambulatory referral to Cardiac Electrophysiology         Return in about 2 months (around 1/14/2025), or if symptoms worsen or fail to improve.      History of Present Illness   78 y.o. female comes in as a new patient for consultation and establishment of care.  She recently moved to this area from [place] and is now in the process of establishing cardiac care here.    She notes to me that she has had ongoing issues with atrial fibrillation since 2022 when she was in [place].    12/2022: cardiac monitor implanted to further evaluate this.    4/10/2023: pacemaker implant .  Mid-4/2023: Within a week after the pacemaker implant, apparently she fell and broke her left hip --> surgical left hip hemiarthroplasty ([place]).  She continued to otherwise do well from the A-fib standpoint but continued to have dizziness ongoing for several months.  She herself felt that it was related to the medication since it started around that time.    11/2023: Fall while walking her dog outside, despite having a pacemaker in place    Over the past 3 to 4 months she has continued to have constant ongoing dizziness, which is mostly unchanged. She states it is worse when she is outside walking.  She is unable to tell me any other clear triggers of her aggravating factors for this.  It is unchanged with change in position or from sit to stand, and in fact was reportedly present throughout the visit.  She denies any vertigo like sensation.    Interval history - 4/25/2024  She returns for follow-up after 6 weeks.  She completed the echocardiogram and has been taking the medications as prescribed but continues to report on and off symptoms of dizziness.  Today she is back in A-fib on clinical exam, which was subsequently confirmed on ECG as well.     Interval history - 6/19/2024  She returns for follow-up after 2 months.  In the interim, she is  doing better and completed the device check in late April.  She has been taking the flecainide and reports improvement in her palpitations.  She does still get some palpitations and she takes extra tablets on her own at these times which helps her symptoms.  No major dizziness, but she is worried about falling.     Interval history - 11/14/2024  She returns for follow-up after 5 months.  She continues to report intermittent episodes of atrial fibrillation.  She unfortunately continues to have intermittent episodes of dizziness as well.  She saw her PCP in October and her blood pressure was noted to be mildly elevated.  Carvedilol was increased after this, but she felt increasingly dizzy thereafter and as a result self decrease the dose back down to 6.4 mg twice daily.  She reports having an episode of nearly passing out after standing up and trying to do something, and then returning back to sit.  No clear palpitations at that time.      Historical Information   Past Medical History:   Diagnosis Date    A-fib ([facility])     Allergic     Arthritis     Coronary artery disease     Disease of thyroid gland     Diverticulitis of colon     History of loop recorder     Hypertension     Multiple falls     Pacemaker     Stroke ([facility])     Substance abuse (Former[facility])     Thyroid disease      Past Surgical History:   Procedure Laterality Date    APPENDECTOMY      BILATERAL HIP ARTHROSCOPY Right     CARDIAC LOOP RECORDER      CARDIAC PACEMAKER PLACEMENT      EYE SURGERY      FRACTURE SURGERY      HYSTERECTOMY      JOINT REPLACEMENT       History reviewed. No pertinent family history.  Current Outpatient Medications   Medication Instructions    acetaminophen (TYLENOL) 650 mg, Oral, Every 6 hours PRN    carvedilol (COREG) 3.125 mg, Oral, 2 times daily with meals    ezetimibe (ZETIA) 10 mg, Oral, Daily    fexofenadine (ALLEGRA) 180 mg, Daily    flecainide (TAMBOCOR) 150 mg, Oral, 2 times daily    methocarbamol (ROBAXIN) 500 mg, Oral, 4 times  "daily    rivaroxaban (XARELTO) 20 mg, Oral, Daily with breakfast    romosozumab-aqqg (EVENITY) 210 mg, Subcutaneous, Every 30 days    romosozumab-aqqg (EVENITY) 210 mg, Subcutaneous, Every 30 days    solifenacin (VESICARE) 10 mg, Oral, Daily    thyroid (ARMOUR) 60 mg, Oral, Daily      Allergies   Allergen Reactions    Other Other (See Comments)     Any \"steroids\" including prednisone and eye steroids; causes muscle weakness, joint pain      Social History     Socioeconomic History    Marital status:      Spouse name: None    Number of children: None    Years of education: None    Highest education level: None   Occupational History    None   Tobacco Use    Smoking status: Former     Current packs/day: 1.00     Average packs/day: 1 pack/day for 15.0 years (15.0 ttl pk-yrs)     Types: Cigarettes     Passive exposure: Never    Smokeless tobacco: Never   Vaping Use    Vaping status: Never Used   Substance and Sexual Activity    Alcohol use: Not Currently    Drug use: Never    Sexual activity: Not Currently   Other Topics Concern    None   Social History Narrative    None     Social Drivers of Health     Financial Resource Strain: Not on file   Food Insecurity: No Food Insecurity (10/4/2024)    Nursing - Inadequate Food Risk Classification     Worried About Running Out of Food in the Last Year: Never true     Ran Out of Food in the Last Year: Never true     Ran Out of Food in the Last Year: Not on file   Transportation Needs: No Transportation Needs (10/4/2024)    PRAPARE - Transportation     Lack of Transportation (Medical): No     Lack of Transportation (Non-Medical): No   Physical Activity: Not on file   Stress: Not on file   Social Connections: Not on file   Intimate Partner Violence: Not on file   Housing Stability: Low Risk  (10/4/2024)    Housing Stability Vital Sign     Unable to Pay for Housing in the Last Year: No     Number of Times Moved in the Last Year: 1     Homeless in the Last Year: No    " "    Review of Systems   All other systems reviewed and are negative.        Vitals:  Vitals:    11/14/24 1259   BP: 122/80   BP Location: Left arm   Patient Position: Sitting   Cuff Size: Adult   Pulse: 81   SpO2: 100%   Weight: 62.1 kg (137 lb)   Height: 5' 4\" (1.626 m)       BMI - Body mass index is 23.52 kg/m².  Wt Readings from Last 7 Encounters:   11/14/24 62.1 kg (137 lb)   11/06/24 63 kg (138 lb 12.8 oz)   10/18/24 63 kg (139 lb)   10/04/24 62.1 kg (137 lb)   06/19/24 62.1 kg (137 lb)   05/02/24 62.1 kg (137 lb)   04/25/24 61.7 kg (136 lb)       Physical Exam  Vitals and nursing note reviewed.   Constitutional:       General: She is not in acute distress.     Appearance: Normal appearance. She is well-developed. She is not ill-appearing.   HENT:      Head: Normocephalic and atraumatic.      Nose: No congestion.   Eyes:      General: No scleral icterus.     Conjunctiva/sclera: Conjunctivae normal.   Neck:      Vascular: No carotid bruit or JVD.   Cardiovascular:      Rate and Rhythm: Normal rate and regular rhythm.      Pulses: Normal pulses.      Heart sounds: Normal heart sounds. No murmur heard.     No friction rub. No gallop.   Pulmonary:      Effort: Pulmonary effort is normal. No respiratory distress.      Breath sounds: Normal breath sounds. No rales.   Abdominal:      General: There is no distension.      Palpations: Abdomen is soft.      Tenderness: There is no abdominal tenderness.   Musculoskeletal:         General: No swelling or tenderness.      Cervical back: Neck supple.      Right lower leg: No edema.      Left lower leg: No edema.   Skin:     General: Skin is warm.   Neurological:      General: No focal deficit present.      Mental Status: She is alert and oriented to person, place, and time. Mental status is at baseline.   Psychiatric:         Mood and Affect: Mood normal.         Behavior: Behavior normal.         Thought Content: Thought content normal.           Labs:  CBC:   Lab Results " "  Component Value Date    WBC 4.16 (L) 03/26/2024    RBC 3.90 03/26/2024    HGB 11.8 03/26/2024    HCT 37.5 03/26/2024    MCV 96 03/26/2024     03/26/2024    RDW 14.2 03/26/2024       CMP:   Lab Results   Component Value Date    K 4.8 05/06/2024     05/06/2024    CO2 28 05/06/2024    BUN 18 05/06/2024    CREATININE 1.07 05/06/2024    EGFR 49 05/06/2024    GLUCOSE 98 11/01/2023    CALCIUM 9.6 05/06/2024    AST 22 05/06/2024    ALT 14 05/06/2024    ALKPHOS 75 05/06/2024       Magnesium:  Lab Results   Component Value Date    MG 2.4 03/26/2024       Lipid Profile:   Lab Results   Component Value Date    HDL 69 03/26/2024    TRIG 123 03/26/2024    LDLCALC 125 (H) 03/26/2024       Thyroid Studies:   Lab Results   Component Value Date    QKF3DJQYIBKR 2.158 03/26/2024       A1c:  No components found for: \"HGA1C\"    INR:  No results found for: \"INR\"5    Cardiac testing:   No results found for this or any previous visit.    No results found for this or any previous visit.    No results found for this or any previous visit.    No results found for this or any previous visit.      Cardiac EP device report  MDT DC PM/ACTIVE SYSTEM IS MRI CONDITIONAL  CARELINK TRANSMISSION: BATTERY STATUS \"12 YRS.\" AP 72%  0%. ALL AVAILABLE LEAD PARAMETERS WITHIN NORMAL LIMITS. 16 FAST A/V NOTED. 118 AT/AF NOTED; 6% BURDEN. PT ON XARELTO & COREG. AVAIL EGRAMS PRESENT AS AF, SVT, & RVR. EF 60% (ECHO 2024). NORMAL DEVICE FUNCTION. NC         "

## 2024-11-29 ENCOUNTER — RA CDI HCC (OUTPATIENT)
Dept: OTHER | Facility: HOSPITAL | Age: 79
End: 2024-11-29

## 2024-12-06 DIAGNOSIS — I48.0 PAROXYSMAL ATRIAL FIBRILLATION (HCC): ICD-10-CM

## 2024-12-06 RX ORDER — FLECAINIDE ACETATE 150 MG/1
150 TABLET ORAL 2 TIMES DAILY
Qty: 180 TABLET | Refills: 2 | Status: SHIPPED | OUTPATIENT
Start: 2024-12-06

## 2024-12-06 RX ORDER — CARVEDILOL 3.12 MG/1
3.12 TABLET ORAL 2 TIMES DAILY WITH MEALS
Qty: 180 TABLET | Refills: 1 | Status: SHIPPED | OUTPATIENT
Start: 2024-12-06

## 2024-12-07 DIAGNOSIS — E03.4 HYPOTHYROIDISM DUE TO ACQUIRED ATROPHY OF THYROID: ICD-10-CM

## 2024-12-09 ENCOUNTER — OFFICE VISIT (OUTPATIENT)
Dept: FAMILY MEDICINE CLINIC | Facility: CLINIC | Age: 79
End: 2024-12-09
Payer: COMMERCIAL

## 2024-12-09 VITALS
BODY MASS INDEX: 23.56 KG/M2 | RESPIRATION RATE: 16 BRPM | SYSTOLIC BLOOD PRESSURE: 124 MMHG | TEMPERATURE: 97.2 F | OXYGEN SATURATION: 99 % | WEIGHT: 138 LBS | HEIGHT: 64 IN | DIASTOLIC BLOOD PRESSURE: 76 MMHG | HEART RATE: 68 BPM

## 2024-12-09 DIAGNOSIS — M81.0 AGE-RELATED OSTEOPOROSIS WITHOUT CURRENT PATHOLOGICAL FRACTURE: Primary | ICD-10-CM

## 2024-12-09 DIAGNOSIS — E03.4 HYPOTHYROIDISM DUE TO ACQUIRED ATROPHY OF THYROID: ICD-10-CM

## 2024-12-09 DIAGNOSIS — E78.00 PURE HYPERCHOLESTEROLEMIA: ICD-10-CM

## 2024-12-09 DIAGNOSIS — I10 PRIMARY HYPERTENSION: ICD-10-CM

## 2024-12-09 DIAGNOSIS — N18.30 STAGE 3 CHRONIC KIDNEY DISEASE, UNSPECIFIED WHETHER STAGE 3A OR 3B CKD (HCC): ICD-10-CM

## 2024-12-09 PROCEDURE — G2211 COMPLEX E/M VISIT ADD ON: HCPCS | Performed by: FAMILY MEDICINE

## 2024-12-09 PROCEDURE — 99214 OFFICE O/P EST MOD 30 MIN: CPT | Performed by: FAMILY MEDICINE

## 2024-12-09 RX ORDER — THYROID 60 MG/1
60 TABLET ORAL DAILY
Qty: 90 TABLET | Refills: 1 | Status: SHIPPED | OUTPATIENT
Start: 2024-12-09

## 2024-12-09 NOTE — PROGRESS NOTES
Name: Natali Hernandez      : 1945      MRN: [id number]  Encounter Provider: Waqar Hart MD  Encounter Date: 2024   Encounter department: [facility]  :  Assessment & Plan  Age-related osteoporosis without current pathological fracture  Patient to continue with weight bearing exercises as much as possible and oral intake of 1200 mg of calcium with 800 IU of Vit D to maximize bone protection. Pt  to continue  with DEXA scan every 2 years to reassess. All qustions regarding this problem answered today to patient satisfaction.          Primary hypertension  Patient is stable with current anti-hypertensive medicine and continue to follow a low sodium diet and take current medication. All questions about this condition were answered today.          Pure hypercholesterolemia  Patient  is stable with current medication and we discussed a low fat low cholesterol diet. Weight loss also discussed for this will help lower cholesterol also. Recheck lipids in 6 months.          Stage 3 chronic kidney disease, unspecified whether stage 3a or 3b CKD (HCC)  Lab Results   Component Value Date    EGFR 49 2024    EGFR 48 2024    EGFR 54 2024    CREATININE 1.07 2024    CREATININE 1.10 2024    CREATININE 0.99 2024   Pt to avoid NSAIDs and any IV dyes. Patient to follow up eoither with nephrology or  with us for  further  monitoring of  renal function.          Hypothyroidism due to acquired atrophy of thyroid               Urinary Incontinence Plan of Care: counseling topics discussed: practice Kegel (pelvic floor strengthening) exercises, use restroom every 2 hours, limit alcohol, caffeine, spicy foods, and acidic foods, limiting fluid intake 3-4 hours before bed, weight loss, preventing constipation and limiting fluid intake to 60 oz. per day.   History of Present Illness     70-year-old female here today for checkup on multimedical positive hypertension  osteoporosis history of a dysrhythmia and is doing quite well.  Patient was to have her Evenity injection today but our office did not have Evenity injection she will be coming back for that soon when it is available patient blood pressure is very good today and will call for when she needs refills for her prescriptions.  Patient also with overactive bladder as well as thyroidism.  Patient is due for TSH which I will order for her for her next visit.      Review of Systems       Objective   There were no vitals taken for this visit.     Physical Exam

## 2024-12-18 ENCOUNTER — CLINICAL SUPPORT (OUTPATIENT)
Dept: FAMILY MEDICINE CLINIC | Facility: CLINIC | Age: 79
End: 2024-12-18
Payer: COMMERCIAL

## 2024-12-18 ENCOUNTER — APPOINTMENT (OUTPATIENT)
Dept: LAB | Facility: MEDICAL CENTER | Age: 79
End: 2024-12-18
Payer: COMMERCIAL

## 2024-12-18 DIAGNOSIS — E03.4 HYPOTHYROIDISM DUE TO ACQUIRED ATROPHY OF THYROID: ICD-10-CM

## 2024-12-18 DIAGNOSIS — I48.0 PAROXYSMAL ATRIAL FIBRILLATION (HCC): ICD-10-CM

## 2024-12-18 DIAGNOSIS — M81.0 AGE-RELATED OSTEOPOROSIS WITHOUT CURRENT PATHOLOGICAL FRACTURE: ICD-10-CM

## 2024-12-18 DIAGNOSIS — M81.0 AGE-RELATED OSTEOPOROSIS WITHOUT CURRENT PATHOLOGICAL FRACTURE: Primary | ICD-10-CM

## 2024-12-18 DIAGNOSIS — E78.5 HYPERLIPIDEMIA, UNSPECIFIED HYPERLIPIDEMIA TYPE: ICD-10-CM

## 2024-12-18 LAB
ALBUMIN SERPL BCG-MCNC: 4.4 G/DL (ref 3.5–5)
ALP SERPL-CCNC: 70 U/L (ref 34–104)
ALT SERPL W P-5'-P-CCNC: 12 U/L (ref 7–52)
ANION GAP SERPL CALCULATED.3IONS-SCNC: 8 MMOL/L (ref 4–13)
AST SERPL W P-5'-P-CCNC: 18 U/L (ref 13–39)
BILIRUB SERPL-MCNC: 0.44 MG/DL (ref 0.2–1)
BUN SERPL-MCNC: 20 MG/DL (ref 5–25)
CALCIUM SERPL-MCNC: 9.3 MG/DL (ref 8.4–10.2)
CHLORIDE SERPL-SCNC: 103 MMOL/L (ref 96–108)
CHOLEST SERPL-MCNC: 208 MG/DL (ref ?–200)
CO2 SERPL-SCNC: 28 MMOL/L (ref 21–32)
CREAT SERPL-MCNC: 1 MG/DL (ref 0.6–1.3)
GFR SERPL CREATININE-BSD FRML MDRD: 53 ML/MIN/1.73SQ M
GLUCOSE P FAST SERPL-MCNC: 86 MG/DL (ref 65–99)
HDLC SERPL-MCNC: 48 MG/DL
LDLC SERPL CALC-MCNC: 142 MG/DL (ref 0–100)
POTASSIUM SERPL-SCNC: 4.3 MMOL/L (ref 3.5–5.3)
PROT SERPL-MCNC: 7.8 G/DL (ref 6.4–8.4)
SODIUM SERPL-SCNC: 139 MMOL/L (ref 135–147)
TRIGL SERPL-MCNC: 89 MG/DL (ref ?–150)
TSH SERPL DL<=0.05 MIU/L-ACNC: 0.62 UIU/ML (ref 0.45–4.5)

## 2024-12-18 PROCEDURE — 96372 THER/PROPH/DIAG INJ SC/IM: CPT | Performed by: FAMILY MEDICINE

## 2024-12-18 PROCEDURE — 80061 LIPID PANEL: CPT

## 2024-12-18 PROCEDURE — 36415 COLL VENOUS BLD VENIPUNCTURE: CPT

## 2024-12-18 PROCEDURE — 80053 COMPREHEN METABOLIC PANEL: CPT

## 2024-12-18 PROCEDURE — 84443 ASSAY THYROID STIM HORMONE: CPT

## 2025-01-10 ENCOUNTER — RA CDI HCC (OUTPATIENT)
Dept: OTHER | Facility: HOSPITAL | Age: 80
End: 2025-01-10

## 2025-01-12 DIAGNOSIS — E78.5 HYPERLIPIDEMIA, UNSPECIFIED HYPERLIPIDEMIA TYPE: ICD-10-CM

## 2025-01-14 RX ORDER — EZETIMIBE 10 MG/1
10 TABLET ORAL DAILY
Qty: 90 TABLET | Refills: 1 | Status: SHIPPED | OUTPATIENT
Start: 2025-01-14

## 2025-01-15 ENCOUNTER — RESULTS FOLLOW-UP (OUTPATIENT)
Dept: CARDIOLOGY CLINIC | Facility: CLINIC | Age: 80
End: 2025-01-15

## 2025-01-20 ENCOUNTER — CLINICAL SUPPORT (OUTPATIENT)
Dept: FAMILY MEDICINE CLINIC | Facility: CLINIC | Age: 80
End: 2025-01-20
Payer: COMMERCIAL

## 2025-01-20 DIAGNOSIS — M81.0 AGE-RELATED OSTEOPOROSIS WITHOUT CURRENT PATHOLOGICAL FRACTURE: Primary | ICD-10-CM

## 2025-01-20 PROCEDURE — 96372 THER/PROPH/DIAG INJ SC/IM: CPT | Performed by: FAMILY MEDICINE

## 2025-01-31 ENCOUNTER — REMOTE DEVICE CLINIC VISIT (OUTPATIENT)
Dept: CARDIOLOGY CLINIC | Facility: CLINIC | Age: 80
End: 2025-01-31
Payer: COMMERCIAL

## 2025-01-31 ENCOUNTER — RESULTS FOLLOW-UP (OUTPATIENT)
Dept: CARDIOLOGY CLINIC | Facility: CLINIC | Age: 80
End: 2025-01-31

## 2025-01-31 DIAGNOSIS — Z95.0 CARDIAC PACEMAKER IN SITU: Primary | ICD-10-CM

## 2025-01-31 PROCEDURE — 93294 REM INTERROG EVL PM/LDLS PM: CPT | Performed by: INTERNAL MEDICINE

## 2025-01-31 PROCEDURE — 93296 REM INTERROG EVL PM/IDS: CPT | Performed by: INTERNAL MEDICINE

## 2025-01-31 NOTE — PROGRESS NOTES
"Results for orders placed or performed in visit on 01/31/25   Cardiac EP device report    Narrative    MDT DC PM/ACTIVE SYSTEM IS MRI CONDITIONAL  CARELINK TRANSMISSION: BATTERY STATUS \"12 YRS.\" AP 60%  4%. ALL AVAILABLE LEAD PARAMETERS WITHIN NORMAL LIMITS. 6 VHRS & 23 FAST A/V NOTED; RATES > 130 BPM. AVAIL EGRAMS SHOWING RVR & SVT. 485 AT/AF NOTED; 25% BURDEN; LONGEST 4.51 HRS. EGRAMS SHOWING AF. PT ON FLECAINIDE, XARELTO, & COREG. EF 60% (ECHO 2024). NORMAL DEVICE FUNCTION. NC         "

## 2025-02-20 ENCOUNTER — HOSPITAL ENCOUNTER (EMERGENCY)
Facility: HOSPITAL | Age: 80
Discharge: HOME | End: 2025-02-20
Attending: EMERGENCY MEDICINE
Payer: COMMERCIAL

## 2025-02-20 VITALS
HEIGHT: 64 IN | DIASTOLIC BLOOD PRESSURE: 68 MMHG | SYSTOLIC BLOOD PRESSURE: 123 MMHG | OXYGEN SATURATION: 100 % | RESPIRATION RATE: 18 BRPM | WEIGHT: 136.02 LBS | HEART RATE: 60 BPM | TEMPERATURE: 97.6 F | BODY MASS INDEX: 23.22 KG/M2

## 2025-02-20 DIAGNOSIS — R42 DIZZINESS: ICD-10-CM

## 2025-02-20 DIAGNOSIS — R42 LIGHTHEADEDNESS: ICD-10-CM

## 2025-02-20 DIAGNOSIS — R00.2 HEART PALPITATIONS: Primary | ICD-10-CM

## 2025-02-20 LAB
ALBUMIN SERPL BCG-MCNC: 4 G/DL (ref 3.5–5)
ALP SERPL-CCNC: 62 U/L (ref 34–104)
ALT SERPL W P-5'-P-CCNC: 13 U/L (ref 7–52)
ANION GAP SERPL CALCULATED.3IONS-SCNC: 6 MMOL/L (ref 4–13)
AST SERPL W P-5'-P-CCNC: 20 U/L (ref 13–39)
BASOPHILS # BLD AUTO: 0.02 THOUSANDS/ΜL (ref 0–0.1)
BASOPHILS NFR BLD AUTO: 0 % (ref 0–1)
BILIRUB SERPL-MCNC: 0.32 MG/DL (ref 0.2–1)
BUN SERPL-MCNC: 25 MG/DL (ref 5–25)
CALCIUM SERPL-MCNC: 8.7 MG/DL (ref 8.4–10.2)
CARDIAC TROPONIN I PNL SERPL HS: 5 NG/L (ref ?–50)
CHLORIDE SERPL-SCNC: 108 MMOL/L (ref 96–108)
CO2 SERPL-SCNC: 23 MMOL/L (ref 21–32)
CREAT SERPL-MCNC: 1.17 MG/DL (ref 0.6–1.3)
EOSINOPHIL # BLD AUTO: 0 THOUSAND/ΜL (ref 0–0.61)
EOSINOPHIL NFR BLD AUTO: 0 % (ref 0–6)
ERYTHROCYTE [DISTWIDTH] IN BLOOD BY AUTOMATED COUNT: 14 % (ref 11.6–15.1)
GFR SERPL CREATININE-BSD FRML MDRD: 44 ML/MIN/1.73SQ M
GLUCOSE SERPL-MCNC: 117 MG/DL (ref 65–140)
HCT VFR BLD AUTO: 30.9 % (ref 34.8–46.1)
HGB BLD-MCNC: 9.9 G/DL (ref 11.5–15.4)
IMM GRANULOCYTES # BLD AUTO: 0.03 THOUSAND/UL (ref 0–0.2)
IMM GRANULOCYTES NFR BLD AUTO: 1 % (ref 0–2)
LYMPHOCYTES # BLD AUTO: 0.55 THOUSANDS/ΜL (ref 0.6–4.47)
LYMPHOCYTES NFR BLD AUTO: 11 % (ref 14–44)
MCH RBC QN AUTO: 30.1 PG (ref 26.8–34.3)
MCHC RBC AUTO-ENTMCNC: 32 G/DL (ref 31.4–37.4)
MCV RBC AUTO: 94 FL (ref 82–98)
MONOCYTES # BLD AUTO: 0.51 THOUSAND/ΜL (ref 0.17–1.22)
MONOCYTES NFR BLD AUTO: 11 % (ref 4–12)
NEUTROPHILS # BLD AUTO: 3.73 THOUSANDS/ΜL (ref 1.85–7.62)
NEUTS SEG NFR BLD AUTO: 77 % (ref 43–75)
NRBC BLD AUTO-RTO: 0 /100 WBCS
PLATELET # BLD AUTO: 157 THOUSANDS/UL (ref 149–390)
PMV BLD AUTO: 10.6 FL (ref 8.9–12.7)
POTASSIUM SERPL-SCNC: 4.6 MMOL/L (ref 3.5–5.3)
PROT SERPL-MCNC: 7.2 G/DL (ref 6.4–8.4)
RBC # BLD AUTO: 3.29 MILLION/UL (ref 3.81–5.12)
SODIUM SERPL-SCNC: 137 MMOL/L (ref 135–147)
WBC # BLD AUTO: 4.84 THOUSAND/UL (ref 4.31–10.16)

## 2025-02-20 PROCEDURE — 85025 COMPLETE CBC W/AUTO DIFF WBC: CPT

## 2025-02-20 PROCEDURE — 99285 EMERGENCY DEPT VISIT HI MDM: CPT | Performed by: EMERGENCY MEDICINE

## 2025-02-20 PROCEDURE — 99284 EMERGENCY DEPT VISIT MOD MDM: CPT

## 2025-02-20 PROCEDURE — 84484 ASSAY OF TROPONIN QUANT: CPT

## 2025-02-20 PROCEDURE — 36415 COLL VENOUS BLD VENIPUNCTURE: CPT

## 2025-02-20 PROCEDURE — 93005 ELECTROCARDIOGRAM TRACING: CPT

## 2025-02-20 PROCEDURE — 80053 COMPREHEN METABOLIC PANEL: CPT

## 2025-02-20 NOTE — ED ATTENDING ATTESTATION
2/20/2025  Isiah TREVINO DO, saw and evaluated the patient. I have discussed the patient with the resident/non-physician practitioner and agree with the resident's/non-physician practitioner's findings, Plan of Care, and MDM as documented in the resident's/non-physician practitioner's note, except where noted. All available labs and Radiology studies were reviewed.  I was present for key portions of any procedure(s) performed by the resident/non-physician practitioner and I was immediately available to provide assistance.       At this point I agree with the current assessment done in the Emergency Department.  I have conducted an independent evaluation of this patient a history and physical is as follows:            1. Heart palpitations    2. Dizziness    3. Lightheadedness            MDM  Number of Diagnoses or Management Options  Dizziness  Heart palpitations  Lightheadedness  Diagnosis management comments:       Initial ED assessment:    79-year-old female, generalized weakness, dizziness, lightheadedness, nonfocal, having palpitations consistent with previous episodes of A-fib    Pathology at risk for includes but is not limited to:   A-fib with RVR, other malignant arrhythmia such as V. tach.,  Electrolyte abnormality, acute kidney injury    Initial ED plan:    Pacemaker interrogation blood work, patient ambulates without difficulty.,  Steady on her feet        Final ED summary/disposition:   After evaluation and workup in the emergency department, ultimately patient discharged, was discussed with cardiology, they will follow closely as an outpatient.               Time reflects when diagnosis was documented in both MDM as applicable and the Disposition within this note       Time User Action Codes Description Comment    2/20/2025  3:06 PM Kay Esparza Add [R42] Dizziness     2/20/2025  3:07 PM Kay Esparza Add [R42] Lightheadedness     2/20/2025  4:03 PM Isiah James Add [R00.2] Heart  "palpitations     2/20/2025  4:03 PM Isiah James Modify [R42] Dizziness     2/20/2025  4:03 PM Isiah James Modify [R00.2] Heart palpitations           ED Disposition       ED Disposition   Discharge    Condition   Stable    Date/Time   Thu Feb 20, 2025  3:05 PM    Comment   Natali Hernandez discharge to home/self care.                   Follow-up Information       Follow up With Specialties Details Why Contact Info Additional Information     [facility] Emergency Department Emergency Medicine  As needed, If symptoms worsen [address] [facility] [address] [phone] [facility] Emergency Department, [facility] [place], [place], [address]                          Chief Complaint   Patient presents with    Dizziness     Pt hx xarelto/afib, medtronic pacemaker; Pt reports dizziness starting 0700, worse with standing, with SOB, intermittent tingling B/L hands and one episode \"little colorful spots\" in vision; -fall/CP; EMS gave 500 ml NS             79-year-old female,  long history of dizziness, says today it was worse than baseline/normal.  Is feeling intermittent palpitations, history of A-fib, has a pacemaker.,  [place] episodes where she felt like she was in rapid A-fib.,  Is chronically anticoagulated.  Does feel dizzy and lightheaded but no near syncope no near falls, feels safe/comfortable ambulating no chest pain no shortness of breath.  Has been evaluated by her cardiologist in the past for exactly this and no exact etiology has been found and she believes it is related to the medications that she is on                    Visit Vitals  /68 (BP Location: Right arm)   Pulse 60   Temp 97.6 °F (36.4 °C) (Oral)   Resp 18   Ht 5' 4\" (1.626 m)   Wt 61.7 kg (136 lb 0.4 oz)   SpO2 100%   BMI 23.35 kg/m²   OB Status Hysterectomy   Smoking Status Former   BSA 1.66 m²        Physical Exam  Vitals reviewed.   Constitutional:       General: She " is not in acute distress.     Appearance: She is well-developed. She is not diaphoretic.   HENT:      Head: Normocephalic and atraumatic.      Right Ear: External ear normal.      Left Ear: External ear normal.      Nose: Nose normal.   Eyes:      General:         Right eye: No discharge.         Left eye: No discharge.      Pupils: Pupils are equal, round, and reactive to light.   Neck:      Trachea: No tracheal deviation.   Cardiovascular:      Rate and Rhythm: Rhythm irregular.      Heart sounds: Normal heart sounds. No murmur heard.     Comments: Irregular on the monitor intermittently paced  Pulmonary:      Effort: Pulmonary effort is normal. No respiratory distress.      Breath sounds: Normal breath sounds. No stridor.   Abdominal:      General: There is no distension.      Palpations: Abdomen is soft.      Tenderness: There is no abdominal tenderness. There is no guarding or rebound.   Musculoskeletal:         General: Normal range of motion.      Cervical back: Normal range of motion and neck supple.   Skin:     General: Skin is warm and dry.      Coloration: Skin is not pale.      Findings: No erythema.   Neurological:      General: No focal deficit present.      Mental Status: She is alert and oriented to person, place, and time.                       Medications - No data to display          Labs Reviewed   CBC AND DIFFERENTIAL - Abnormal       Result Value Ref Range Status    WBC 4.84  4.31 - 10.16 Thousand/uL Final    RBC 3.29 (*) 3.81 - 5.12 Million/uL Final    Hemoglobin 9.9 (*) 11.5 - 15.4 g/dL Final    Hematocrit 30.9 (*) 34.8 - 46.1 % Final    MCV 94  82 - 98 fL Final    MCH 30.1  26.8 - 34.3 pg Final    MCHC 32.0  31.4 - 37.4 g/dL Final    RDW 14.0  11.6 - 15.1 % Final    MPV 10.6  8.9 - 12.7 fL Final    Platelets 157  149 - 390 Thousands/uL Final    nRBC 0  /100 WBCs Final    Segmented % 77 (*) 43 - 75 % Final    Immature Grans % 1  0 - 2 % Final    Lymphocytes % 11 (*) 14 - 44 % Final     "Monocytes % 11  4 - 12 % Final    Eosinophils Relative 0  0 - 6 % Final    Basophils Relative 0  0 - 1 % Final    Absolute Neutrophils 3.73  1.85 - 7.62 Thousands/µL Final    Absolute Immature Grans 0.03  0.00 - 0.20 Thousand/uL Final    Absolute Lymphocytes 0.55 (*) 0.60 - 4.47 Thousands/µL Final    Absolute Monocytes 0.51  0.17 - 1.22 Thousand/µL Final    Eosinophils Absolute 0.00  0.00 - 0.61 Thousand/µL Final    Basophils Absolute 0.02  0.00 - 0.10 Thousands/µL Final   HS TROPONIN I 0HR - Normal    hs TnI 0hr 5  \"Refer to ACS Flowchart\"- see link ng/L Final    Comment:                                              Initial (time 0) result  If >=50 ng/L, Myocardial injury suggested ;  Type of myocardial injury and treatment strategy  to be determined.  If 5-49 ng/L, a delta result at 2 hours and or 4 hours will be needed to further evaluate.  If <4 ng/L, and chest pain has been >3 hours since onset, patient may qualify for discharge based on the HEART score in the ED.  If <5 ng/L and <3hours since onset of chest pain, a delta result at 2 hours will be needed to further evaluate.    HS Troponin 99th Percentile URL of a Health Population=12 ng/L with a 95% Confidence Interval of 8-18 ng/L.    Second Troponin (time 2 hours)  If calculated delta >= 20 ng/L,  Myocardial injury suggested ; Type of myocardial injury and treatment strategy to be determined.  If 5-49 ng/L and the calculated delta is 5-19 ng/L, consult medical service for evaluation.  Continue evaluation for ischemia on ecg and other possible etiology and repeat hs troponin at 4 hours.  If delta is <5 ng/L at 2 hours, consider discharge based on risk stratification via the HEART score (if in ED), or RENAE risk score in IP/Observation.    HS Troponin 99th Percentile URL of a Health Population=12 ng/L with a 95% Confidence Interval of 8-18 ng/L.   COMPREHENSIVE METABOLIC PANEL    Sodium 137  135 - 147 mmol/L Final    Potassium 4.6  3.5 - 5.3 mmol/L Final    " Chloride 108  96 - 108 mmol/L Final    CO2 23  21 - 32 mmol/L Final    ANION GAP 6  4 - 13 mmol/L Final    BUN 25  5 - 25 mg/dL Final    Creatinine 1.17  0.60 - 1.30 mg/dL Final    Comment: Standardized to IDMS reference method    Glucose 117  65 - 140 mg/dL Final    Comment: If the patient is fasting, the ADA then defines impaired fasting glucose as > 100 mg/dL and diabetes as > or equal to 123 mg/dL.    Calcium 8.7  8.4 - 10.2 mg/dL Final    AST 20  13 - 39 U/L Final    ALT 13  7 - 52 U/L Final    Comment: Specimen collection should occur prior to Sulfasalazine administration due to the potential for falsely depressed results.     Alkaline Phosphatase 62  34 - 104 U/L Final    Total Protein 7.2  6.4 - 8.4 g/dL Final    Albumin 4.0  3.5 - 5.0 g/dL Final    Total Bilirubin 0.32  0.20 - 1.00 mg/dL Final    Comment: Use of this assay is not recommended for patients undergoing treatment with eltrombopag due to the potential for falsely elevated results.  N-acetyl-p-benzoquinone imine (metabolite of Acetaminophen) will generate erroneously low results in samples for patients that have taken an overdose of Acetaminophen.    eGFR 44  ml/min/1.73sq m Final    Narrative:     National Kidney Disease Foundation guidelines for Chronic Kidney Disease (CKD):     Stage 1 with normal or high GFR (GFR > 90 mL/min/1.73 square meters)    Stage 2 Mild CKD (GFR = 60-89 mL/min/1.73 square meters)    Stage 3A Moderate CKD (GFR = 45-59 mL/min/1.73 square meters)    Stage 3B Moderate CKD (GFR = 30-44 mL/min/1.73 square meters)    Stage 4 Severe CKD (GFR = 15-29 mL/min/1.73 square meters)    Stage 5 End Stage CKD (GFR <15 mL/min/1.73 square meters)  Note: GFR calculation is accurate only with a steady state creatinine         No orders to display                  Procedures

## 2025-02-20 NOTE — ED PROVIDER NOTES
Time reflects when diagnosis was documented in both MDM as applicable and the Disposition within this note       Time User Action Codes Description Comment    2/20/2025  3:06 PM Kay Esparza Add [R42] Dizziness     2/20/2025  3:07 PM Kay Esparza Add [R42] Lightheadedness           ED Disposition       ED Disposition   Discharge    Condition   Stable    Date/Time   Thu Feb 20, 2025  3:05 PM    Comment   Natali Hernandez discharge to home/self care.                   Assessment & Plan       Medical Decision Making  Please see ED course for MDM and disposition.    Amount and/or Complexity of Data Reviewed  Labs: ordered. Decision-making details documented in ED Course.        ED Course as of 02/20/25 1527   Thu Feb 20, 2025   1328 Patient presents afebrile and hemodynamically stable.  Currently asymptomatic while resting in ED bed.   1328 EKG shows AV paced rhythm at 65 bpm with frequent PVCs and wide QRS complexes.  Prolonged QTc at 665 ms.   1329 Planning to get basic labs to check electrolytes and troponin.   1330 Differential diagnoses: ACS, arrhythmia, SVT, electrolyte abnormalities, dehydration, orthostatic hypotension   1411 Bedside interrogation of sevenloadtronic AICD, records showing multiple episodes of atrial tachycardia/A-fib around 8 to 9 AM this morning with a maximum HR in the 140s.  No reported V. tach episodes or shocks given.   1414 Hemoglobin(!): 9.9  Mild anemia, but no concern for acute blood loss active bleeding.   1414 Comprehensive metabolic panel  CMP within normal limits, consistent with baseline.   1414 hs TnI 0hr: 5  Negative initial troponin.  She denies any active chest pain.   1430 Trial ambulation, patient still experiencing lightheadedness and dizziness upon standing.    1442 Reached out to Cardiology on call regarding results and significantly prolonged Qtc via Epic Chat.   1508 No further recommendations from cardiologist Dr. Celestin, not concerned about prolonged QTc since rhythm  "is paced and with PVCs.   1526 Shared decision making discussion with patient regarding plans for discharge home versus admission if patient feels uncomfortable ambulating and performing ADLs at home.  Patient states that her dizziness upon standing has improved and she feels comfortable going home.  Patient has a walker at home that she can use and her son will stay with her tonight to provide further support as needed.   1526 Patient comfortable with and stable for discharge home with return precautions and recommendations to make a follow-up appointment with her cardiologist within the next week.       Medications - No data to display    ED Risk Strat Scores                                                History of Present Illness       Chief Complaint   Patient presents with    Dizziness     Pt hx xarelto/afib, medtronic pacemaker; Pt reports dizziness starting 0700, worse with standing, with SOB, intermittent tingling B/L hands and one episode \"little colorful spots\" in vision; -fall/CP; EMS gave 500 ml NS       Past Medical History:   Diagnosis Date    A-fib (HCC)     Allergic     Arthritis     Coronary artery disease     Disease of thyroid gland     Diverticulitis of colon     History of loop recorder     Hypertension     Multiple falls     Pacemaker     Stroke (HCC)     Substance abuse (HCC)     Thyroid disease       Past Surgical History:   Procedure Laterality Date    APPENDECTOMY      BILATERAL HIP ARTHROSCOPY Right     CARDIAC LOOP RECORDER      CARDIAC PACEMAKER PLACEMENT      EYE SURGERY      FRACTURE SURGERY      HYSTERECTOMY      JOINT REPLACEMENT        History reviewed. No pertinent family history.   Social History     Tobacco Use    Smoking status: Former     Current packs/day: 1.00     Average packs/day: 1 pack/day for 15.0 years (15.0 ttl pk-yrs)     Types: Cigarettes     Passive exposure: Never    Smokeless tobacco: Never   Vaping Use    Vaping status: Never Used   Substance Use Topics    " Alcohol use: Not Currently    Drug use: Never      E-Cigarette/Vaping    E-Cigarette Use Never User       E-Cigarette/Vaping Substances    Nicotine No     THC No     CBD No     Flavoring No     Other No     Unknown No       I have reviewed and agree with the history as documented.     79-year-old female with a PMH of HTN, A-fib on Xarelto, pacemaker, and CKD 3 presenting for transient episode of lightheadedness, nausea, and visual changes around 9 AM this morning.  Patient states that the symptoms started upon standing.  Patient reports that her lightheadedness is a common symptom she experiences when her heart rate goes into A-fib with RVR.  Patient denies feeling any shocks from her AICD today.  Patient states that she also gets baseline dizziness with her current medications of carvedilol and flecainide, but the visual changes were different from normal.  Patient reported seeing colorful spots in her vision and bilateral blurred vision for about an hour, which has now self resolved.  Denies any associated chest pain or shortness of breath.  Patient has been compliant with her medications and took her morning doses of these already.  Denies any recent illnesses or medication changes.  Currently resting comfortably and states if she is sitting down, she does not have any of her previous symptoms.          Review of Systems   Constitutional:  Negative for chills and fever.   Eyes:  Positive for visual disturbance.   Respiratory:  Negative for shortness of breath.    Cardiovascular:  Negative for chest pain and palpitations.   Gastrointestinal:  Negative for abdominal pain, diarrhea, nausea and vomiting.   Genitourinary:  Negative for dysuria and hematuria.   Musculoskeletal:  Negative for arthralgias and myalgias.   Skin:  Negative for rash and wound.   Neurological:  Positive for dizziness and light-headedness. Negative for syncope, weakness, numbness and headaches.   Psychiatric/Behavioral:  Negative for  confusion.    All other systems reviewed and are negative.          Objective       ED Triage Vitals [02/20/25 1251]   Temperature Pulse Blood Pressure Respirations SpO2 Patient Position - Orthostatic VS   97.6 °F (36.4 °C) 64 112/60 18 100 % Sitting      Temp Source Heart Rate Source BP Location FiO2 (%) Pain Score    Oral Monitor Right arm -- No Pain      Vitals      Date and Time Temp Pulse SpO2 Resp BP Pain Score FACES Pain Rating User   02/20/25 1440 -- 60 100 % 18 123/68 -- -- ML   02/20/25 1400 -- 61 100 % -- 100/67 -- -- ML   02/20/25 1330 -- 60 100 % -- 122/61 -- -- ADA   02/20/25 1330 -- -- -- 18 -- No Pain -- ML   02/20/25 1311 -- -- -- -- -- No Pain -- ML   02/20/25 1300 -- 62 100 % -- -- -- -- ML   02/20/25 1251 97.6 °F (36.4 °C) 64 100 % 18 112/60 No Pain -- ML            Physical Exam  Vitals and nursing note reviewed.   Constitutional:       General: She is not in acute distress.     Appearance: Normal appearance. She is normal weight. She is not ill-appearing, toxic-appearing or diaphoretic.   HENT:      Head: Normocephalic and atraumatic.      Right Ear: External ear normal.      Left Ear: External ear normal.      Nose: Nose normal.      Mouth/Throat:      Mouth: Mucous membranes are moist.   Eyes:      General: No scleral icterus.        Right eye: No discharge.         Left eye: No discharge.      Extraocular Movements: Extraocular movements intact.      Conjunctiva/sclera: Conjunctivae normal.      Pupils: Pupils are equal, round, and reactive to light.   Cardiovascular:      Rate and Rhythm: Normal rate and regular rhythm.      Heart sounds: Normal heart sounds. No murmur heard.  Pulmonary:      Effort: Pulmonary effort is normal. No respiratory distress.      Breath sounds: Normal breath sounds. No wheezing, rhonchi or rales.   Abdominal:      General: Abdomen is flat.      Palpations: Abdomen is soft.      Tenderness: There is no abdominal tenderness. There is no guarding or rebound.    Musculoskeletal:         General: Normal range of motion.      Cervical back: Normal range of motion.      Right lower leg: No edema.      Left lower leg: No edema.   Skin:     General: Skin is warm and dry.      Capillary Refill: Capillary refill takes less than 2 seconds.   Neurological:      General: No focal deficit present.      Mental Status: She is alert and oriented to person, place, and time. Mental status is at baseline.      Sensory: No sensory deficit.      Motor: No weakness.   Psychiatric:         Mood and Affect: Mood normal.         Behavior: Behavior normal.         Results Reviewed       Procedure Component Value Units Date/Time    HS Troponin 0hr (reflex protocol) [[id number]]  (Normal) Collected: 02/20/25 1331    Lab Status: Final result Specimen: Blood from Arm, Left Updated: 02/20/25 1402     hs TnI 0hr 5 ng/L     Comprehensive metabolic panel [[id number]] Collected: 02/20/25 1331    Lab Status: Final result Specimen: Blood from Arm, Left Updated: 02/20/25 1355     Sodium 137 mmol/L      Potassium 4.6 mmol/L      Chloride 108 mmol/L      CO2 23 mmol/L      ANION GAP 6 mmol/L      BUN 25 mg/dL      Creatinine 1.17 mg/dL      Glucose 117 mg/dL      Calcium 8.7 mg/dL      AST 20 U/L      ALT 13 U/L      Alkaline Phosphatase 62 U/L      Total Protein 7.2 g/dL      Albumin 4.0 g/dL      Total Bilirubin 0.32 mg/dL      eGFR 44 ml/min/1.73sq m     Narrative:      National Kidney Disease Foundation guidelines for Chronic Kidney Disease (CKD):     Stage 1 with normal or high GFR (GFR > 90 mL/min/1.73 square meters)    Stage 2 Mild CKD (GFR = 60-89 mL/min/1.73 square meters)    Stage 3A Moderate CKD (GFR = 45-59 mL/min/1.73 square meters)    Stage 3B Moderate CKD (GFR = 30-44 mL/min/1.73 square meters)    Stage 4 Severe CKD (GFR = 15-29 mL/min/1.73 square meters)    Stage 5 End Stage CKD (GFR <15 mL/min/1.73 square meters)  Note: GFR calculation is accurate only with a steady state creatinine    CBC and  differential [[id number]]  (Abnormal) Collected: 02/20/25 1331    Lab Status: Final result Specimen: Blood from Arm, Left Updated: 02/20/25 1342     WBC 4.84 Thousand/uL      RBC 3.29 Million/uL      Hemoglobin 9.9 g/dL      Hematocrit 30.9 %      MCV 94 fL      MCH 30.1 pg      MCHC 32.0 g/dL      RDW 14.0 %      MPV 10.6 fL      Platelets 157 Thousands/uL      nRBC 0 /100 WBCs      Segmented % 77 %      Immature Grans % 1 %      Lymphocytes % 11 %      Monocytes % 11 %      Eosinophils Relative 0 %      Basophils Relative 0 %      Absolute Neutrophils 3.73 Thousands/µL      Absolute Immature Grans 0.03 Thousand/uL      Absolute Lymphocytes 0.55 Thousands/µL      Absolute Monocytes 0.51 Thousand/µL      Eosinophils Absolute 0.00 Thousand/µL      Basophils Absolute 0.02 Thousands/µL             No orders to display       ECG 12 Lead Documentation Only    Date/Time: 2/20/2025 1:25 PM    Performed by: Kay Esparza MD  Authorized by: Kay Esparza MD    Indications / Diagnosis:  Dizziness/lightheadedness  ECG reviewed by me, the ED Provider: yes    Patient location:  ED  Previous ECG:     Previous ECG:  Unavailable  Interpretation:     Interpretation: abnormal    Rate:     ECG rate:  65 bpm    ECG rate assessment: normal    Rhythm:     Rhythm: sinus rhythm and paced    Pacing:     Capture:  Complete    Type of pacing:  AV  Ectopy:     Ectopy: PVCs      PVCs:  Frequent  QRS:     QRS axis:  Normal    QRS intervals:  Wide  Conduction:     Conduction: normal    ST segments:     ST segments:  Normal  T waves:     T waves: normal    Other findings:     Other findings: prolonged qTc interval      Other findings comment:  QTc 665 ms.      ED Medication and Procedure Management   Prior to Admission Medications   Prescriptions Last Dose Informant Patient Reported? Taking?   acetaminophen (TYLENOL) 325 mg tablet  Self No No   Sig: Take 2 tablets (650 mg total) by mouth every 6 (six) hours as needed for mild pain    carvedilol (COREG) 3.125 mg tablet 2/20/2025  No Yes   Sig: TAKE 1 TABLET BY MOUTH TWICE A DAY WITH MEALS   ezetimibe (ZETIA) 10 mg tablet   No No   Sig: TAKE 1 TABLET BY MOUTH EVERY DAY   fexofenadine (ALLEGRA) 180 MG tablet  Self Yes Yes   Sig: Take 180 mg by mouth daily   flecainide (TAMBOCOR) 150 MG tablet 2/20/2025  No Yes   Sig: TAKE 1 TABLET BY MOUTH TWICE A DAY   methocarbamol (ROBAXIN) 500 mg tablet Not Taking Self No No   Sig: Take 1 tablet (500 mg total) by mouth 4 (four) times a day   Patient not taking: Reported on 3/22/2024   rivaroxaban (Xarelto) 20 mg tablet 2/20/2025 Self No Yes   Sig: Take 1 tablet (20 mg total) by mouth daily with breakfast   romosozumab-aqqg (EVENITY) subcutaneous injection   No No   Sig: Inject 2.34 mL (210 mg total) under the skin every 30 (thirty) days   romosozumab-aqqg (EVENITY) subcutaneous injection   No No   Sig: Inject 2.34 mL (210 mg total) under the skin every 30 (thirty) days   solifenacin (VESICARE) 10 MG tablet  Self No No   Sig: TAKE 1 TABLET BY MOUTH EVERY DAY   thyroid (ARMOUR) 60 MG tablet 2/20/2025  No Yes   Sig: TAKE 1 TABLET BY MOUTH EVERY DAY      Facility-Administered Medications: None     Patient's Medications   Discharge Prescriptions    No medications on file     No discharge procedures on file.  ED SEPSIS DOCUMENTATION   Time reflects when diagnosis was documented in both MDM as applicable and the Disposition within this note       Time User Action Codes Description Comment    2/20/2025  3:06 PM Kay Esparza [R42] Dizziness     2/20/2025  3:07 PM Kay Esparza [R42] Lightheadedness                  Kay Esparza MD  02/20/25 3176

## 2025-02-22 LAB
ATRIAL RATE: 61 BPM
ATRIAL RATE: 80 BPM
QRS AXIS: -86 DEGREES
QRS AXIS: -88 DEGREES
QRSD INTERVAL: 174 MS
QRSD INTERVAL: 186 MS
QT INTERVAL: 630 MS
QT INTERVAL: 640 MS
QTC INTERVAL: 644 MS
QTC INTERVAL: 665 MS
T WAVE AXIS: 81 DEGREES
T WAVE AXIS: 92 DEGREES
VENTRICULAR RATE: 63 BPM
VENTRICULAR RATE: 65 BPM

## 2025-02-22 PROCEDURE — 93010 ELECTROCARDIOGRAM REPORT: CPT | Performed by: INTERNAL MEDICINE

## 2025-02-25 ENCOUNTER — CLINICAL SUPPORT (OUTPATIENT)
Dept: FAMILY MEDICINE CLINIC | Facility: CLINIC | Age: 80
End: 2025-02-25
Payer: COMMERCIAL

## 2025-02-25 DIAGNOSIS — M81.0 OSTEOPOROSIS, POST-MENOPAUSAL: Primary | ICD-10-CM

## 2025-02-25 DIAGNOSIS — M81.0 AGE-RELATED OSTEOPOROSIS WITHOUT CURRENT PATHOLOGICAL FRACTURE: ICD-10-CM

## 2025-02-25 PROCEDURE — 96372 THER/PROPH/DIAG INJ SC/IM: CPT | Performed by: NURSE PRACTITIONER

## 2025-03-12 ENCOUNTER — TELEPHONE (OUTPATIENT)
Age: 80
End: 2025-03-12

## 2025-03-12 NOTE — TELEPHONE ENCOUNTER
Pt called in wanting to know why her appointment EP was moved from 3/27 to 4/2.     Warm transferred call to Deja at the EP office.

## 2025-03-23 DIAGNOSIS — I48.0 PAROXYSMAL ATRIAL FIBRILLATION (HCC): ICD-10-CM

## 2025-03-25 RX ORDER — RIVAROXABAN 20 MG/1
20 TABLET, FILM COATED ORAL
Qty: 90 TABLET | Refills: 1 | Status: SHIPPED | OUTPATIENT
Start: 2025-03-25

## 2025-03-26 ENCOUNTER — CLINICAL SUPPORT (OUTPATIENT)
Dept: FAMILY MEDICINE CLINIC | Facility: CLINIC | Age: 80
End: 2025-03-26
Payer: COMMERCIAL

## 2025-03-26 DIAGNOSIS — M81.0 AGE-RELATED OSTEOPOROSIS WITHOUT CURRENT PATHOLOGICAL FRACTURE: Primary | ICD-10-CM

## 2025-03-26 PROCEDURE — 96372 THER/PROPH/DIAG INJ SC/IM: CPT | Performed by: FAMILY MEDICINE

## 2025-04-17 ENCOUNTER — OFFICE VISIT (OUTPATIENT)
Dept: CARDIOLOGY CLINIC | Facility: CLINIC | Age: 80
End: 2025-04-17
Payer: COMMERCIAL

## 2025-04-17 ENCOUNTER — TELEPHONE (OUTPATIENT)
Dept: CARDIOLOGY CLINIC | Facility: CLINIC | Age: 80
End: 2025-04-17

## 2025-04-17 VITALS
SYSTOLIC BLOOD PRESSURE: 194 MMHG | BODY MASS INDEX: 22.52 KG/M2 | WEIGHT: 131.9 LBS | HEART RATE: 76 BPM | DIASTOLIC BLOOD PRESSURE: 98 MMHG | OXYGEN SATURATION: 100 % | HEIGHT: 64 IN

## 2025-04-17 DIAGNOSIS — I48.0 PAROXYSMAL ATRIAL FIBRILLATION (HCC): ICD-10-CM

## 2025-04-17 DIAGNOSIS — N18.30 STAGE 3 CHRONIC KIDNEY DISEASE, UNSPECIFIED WHETHER STAGE 3A OR 3B CKD (HCC): ICD-10-CM

## 2025-04-17 DIAGNOSIS — R42 DIZZINESS: ICD-10-CM

## 2025-04-17 DIAGNOSIS — Z95.0 CARDIAC PACEMAKER IN SITU: ICD-10-CM

## 2025-04-17 DIAGNOSIS — I48.0 PAROXYSMAL ATRIAL FIBRILLATION (HCC): Primary | ICD-10-CM

## 2025-04-17 PROCEDURE — 99215 OFFICE O/P EST HI 40 MIN: CPT | Performed by: INTERNAL MEDICINE

## 2025-04-17 RX ORDER — AMIODARONE HYDROCHLORIDE 200 MG/1
TABLET ORAL
Qty: 180 TABLET | Refills: 1 | Status: SHIPPED | OUTPATIENT
Start: 2025-04-17

## 2025-04-17 NOTE — LETTER
2025               CARDIAC ABLATION INSTRUCTIONS     Natali Hernandez   : 1945  MRN: [id number]  [address]  [address]  [place] [address]    Procedure: AFIB PFA ABLATION / WATCHMAN     Procedure Date: 25    Location: [facility]  Address: [place] [address]        Labs to be done on 25: CMP /  CBC (FASTING 8 HOURS)    BLOOD THINNER INSTRUCTIONS (Coumadin / Warfarin / Pradaxa / Eliquis / Xarelto):     Xarelto - Keep taking and do not stop.      Medication Hold:     N/A    Arrival Time: The Hospital will contact you the day prior to your procedure, usually between 4PM - 6PM to instruct you on the time and place to report. If you do not hear from a [facility]  by 5PM the evening prior to your procedure, please contact the [facility] at [phone].    DO NOT eat or drink ANYTHING after midnight the night prior to your procedure including gum & candy.     You may have a SIP of WATER with your morning medications, UNLESS ADVISE OTHERWISE.     Please notify us if you have been prescribed a NEW MEDICATION prior to your procedure or admitted to the hospital within 30 days.      If you develop a cold, sore throat, fever or any other illness prior to your procedure date, notify your surgeon immediately.    Arrange for a responsible adult to drive you to and from the hospital.    DO NOT stop taking Plavix or Aspirin unless advised otherwise.       If you are currently taking Fish Oil, Krill oil and/or Vitamin E please DO NOT TAKE FOR A WEEK PRIOR TO PROCEDURE.     If you are diabetic, DO NOT take any of your diabetic pills the morning of your procedure. Oral diabetic medications may include Glucophage, Prandin, Glyburide, Micronase, Avandia, Glucovance, Precose, Glynase, and Starlix.     Bring a list of daily medications, vitamins, minerals, herbals and nutritional supplements you take. Please include dosages and the times you take them each day.  "    If you are packing an overnight bag, pack minimal clothing, you will be given hospital sleepwear.   DO NOT bring money, valuables or jewelry. The wedding band is ok.     If you use CPAP machine, bring it to the hospital.      Bring your Photo ID and Insurance cards with you.       FAILURE TO FOLLOW ANY OF THESE INSTRUCTIONS COULD RESULT IN THE CANCELLATION OF YOUR PROCEDURE      Please call [phone] if you do not hear from the  by 5:00PM the night before your procedure.    All patients enter through ENTRANCE B. [place] Parking is available free of charge or park on Parking Deck B.        Thank you,   Ligia \"Adriana\" Ap    [facility]   [address] [place] [address]  Teams: [phone]             "

## 2025-04-17 NOTE — TELEPHONE ENCOUNTER
"Left voicemail on machine informing patient to call and schedule a AFIB PFA ABLATION / WATCHMAN procedure.     Thanks,  Ligia \"Adriana\" Ap    "

## 2025-04-17 NOTE — TELEPHONE ENCOUNTER
----- Message from Harsha Burnett MD sent at 4/17/2025  1:51 PM EDT -----  Please schedule for combined afib ablation and watchman. GWYN, navx, pfa.  No hold on xarelto  No hold on amiodarone.    thanks

## 2025-04-17 NOTE — PROGRESS NOTES
HEART AND VASCULAR  CARDIAC ELECTROPHYSIOLOGY   HEART RHYTHM CENTER  [facility]    Outpatient New Consult    Today's Date: 04/17/25        Patient name: Natali Hernandez  YOB: 1945  Sex: female         Chief Complaint: Referral for afib.       ASSESSMENT:  Problem List Items Addressed This Visit          Genitourinary    Stage 3 chronic kidney disease, unspecified whether stage 3a or 3b CKD (HCC)     Other Visit Diagnoses         Cardiac pacemaker in situ        Relevant Medications    amiodarone 200 mg tablet      Paroxysmal atrial fibrillation (HCC)        Relevant Medications    amiodarone 200 mg tablet    Other Relevant Orders    POCT ECG      Dizziness        Relevant Medications    amiodarone 200 mg tablet          80 yo female  1) Tachy amaris, paroxysmal afib and flutter with RVR >130bpm 6-25% on pacemaker checks, symptomatic with fatigue, chest discomfort, on Flecaindie 150mg bid not working. On xarelto . Normal EF [id number] HASBLED 3  2) Dual chamber ppm placed, A paced, placed in [place] a few years ago  3) HTN- poorly controlled asymptomatic but SBP 190s/98 today, I rechecked myself. Previously was on coreg 6.25mg bid with dizzyness/hypotension so lowered  4) Frequent falls, balance issues, multiple fractures due to osteoporosis        PLAN:  Recommend afib ablation with PFA combined with Watchman due to frequent falls and bleeding risks. Alternative such as ablaiton and observe if no afib with pacemaker and stop AC also discussed but she prefers doing it all at once for definiteve treatment.  Recommend Atrial fibrillation and/or flutter ablation.  Additional arrhythmias may also be targeted. Transesophageal echocardiogram maybe used to rule out thrombus. Risks and benefits discussed with patient and family. Explained risk of rare but serious complications like heart perforation, stroke, heart attack, kidney injury. Energy source for ablation my vary including  radiofrequency, pulsed field (PFA), or cryoablation. PFA ablation does not have known esophageal injury risk so general safer from this life threatening complication. Risks of phrenic nerve (diaphragm) and lung injury also is less with this energy source.    Explained ablation for atrial fibrillation is treatment not a cure, about 20% of patient will opt for second ablation. We went over usually recovery and expectations of going through procedure.    Discussed risks and benefits of Watchman at length. We went over trial data, serious complications including bleeding around the heart, stroke, death, surgery. Explained it would lower stroke rate similar to if taking anticoagulation but ultimately will lower bleeding risk. I explained need for some form of anticoagulation for up to  first 6 months and risks of bleeding then, also risk of thrombus on device necessating need for blood thinners after procedure. We discussed need for repeat Transesophageal echocardiograms to monitor device and also pre procedure.         Stop Flecainide, start Amiodarone (200mg tid x2  weeks load, then 200mg daily ) to bridge to albation since flecainide not working      INcrease coreg to 6.25mg am and 3.125mg pm, take extra dose now since BP very high. Check BP daily              Orders Placed This Encounter   Procedures    POCT ECG     Medications Discontinued During This Encounter   Medication Reason    flecainide (TAMBOCOR) 150 MG tablet          .............................................................................................    HPI/Subjective:   78 yo female  1) Tachy amaris, paroxysmal afib and flutter with RVR >130bpm 6-25% on pacemaker checks, symptomatic with fatigue, chest discomfort, on Flecaindie 150mg bid not working. On xarelto . Normal EF [id number] HASBLED 3  2) Dual chamber ppm placed, A paced, placed in [place] a few years ago  3) HTN- poorly controlled asymptomatic but SBP 190s/98 today, I rechecked  "myself. Previously was on coreg 6.25mg bid with dizzyness/hypotension so lowered  4) Frequent falls, balance issues, multiple fractures due to osteoporosis    She is here with sun, she has had rough time passed couple years due to frequent falls, fractures, needed pacemaker in [place], now having frequent afib w RVR very symptomatic not controlled well on current meds. No edema,. She endourses SOB, chest discomfort and dizzyness with afib episodes.           Past Medical History:   Diagnosis Date    A-fib ([facility])     Allergic     Arthritis     Coronary artery disease     Disease of thyroid gland     Diverticulitis of colon     History of loop recorder     Hypertension     Multiple falls     Pacemaker     Stroke ([facility])     Substance abuse ([facility])     Thyroid disease        Allergies   Allergen Reactions    Other Other (See Comments)     Any \"steroids\" including prednisone and eye steroids; causes muscle weakness, joint pain      I reviewed the Home Medication list and Allergies in the chart.   Scheduled Meds:  Current Outpatient Medications   Medication Sig Dispense Refill    acetaminophen (TYLENOL) 325 mg tablet Take 2 tablets (650 mg total) by mouth every 6 (six) hours as needed for mild pain  0    amiodarone 200 mg tablet Take 3x a day for 2 weeks. AFter 2 weeks take 1x a day. 180 tablet 1    carvedilol (COREG) 3.125 mg tablet TAKE 1 TABLET BY MOUTH TWICE A DAY WITH MEALS 180 tablet 1    fexofenadine (ALLEGRA) 180 MG tablet Take 180 mg by mouth daily      rivaroxaban (Xarelto) 20 mg tablet TAKE 1 TABLET BY MOUTH DAILY WITH BREAKFAST 90 tablet 1    romosozumab-aqqg (EVENITY) subcutaneous injection Inject 2.34 mL (210 mg total) under the skin every 30 (thirty) days 2.34 mL 0    romosozumab-aqqg (EVENITY) subcutaneous injection Inject 2.34 mL (210 mg total) under the skin every 30 (thirty) days 2.34 mL 11    solifenacin (VESICARE) 10 MG tablet TAKE 1 TABLET BY MOUTH EVERY DAY 90 tablet 1    thyroid (ARMOUR) 60 MG tablet " TAKE 1 TABLET BY MOUTH EVERY DAY 90 tablet 1    ezetimibe (ZETIA) 10 mg tablet TAKE 1 TABLET BY MOUTH EVERY DAY (Patient not taking: Reported on 4/17/2025) 90 tablet 1    methocarbamol (ROBAXIN) 500 mg tablet Take 1 tablet (500 mg total) by mouth 4 (four) times a day (Patient not taking: Reported on 3/22/2024) 65 tablet 0     No current facility-administered medications for this visit.     PRN Meds:.        History reviewed. No pertinent family history.    Social History     Socioeconomic History    Marital status:      Spouse name: Not on file    Number of children: Not on file    Years of education: Not on file    Highest education level: Not on file   Occupational History    Not on file   Tobacco Use    Smoking status: Former     Current packs/day: 1.00     Average packs/day: 1 pack/day for 15.0 years (15.0 ttl pk-yrs)     Types: Cigarettes     Passive exposure: Never    Smokeless tobacco: Never   Vaping Use    Vaping status: Never Used   Substance and Sexual Activity    Alcohol use: Not Currently    Drug use: Never    Sexual activity: Not Currently   Other Topics Concern    Not on file   Social History Narrative    Not on file     Social Drivers of Health     Financial Resource Strain: Not on file   Food Insecurity: No Food Insecurity (10/4/2024)    Nursing - Inadequate Food Risk Classification     Worried About Running Out of Food in the Last Year: Never true     Ran Out of Food in the Last Year: Never true     Ran Out of Food in the Last Year: Not on file   Transportation Needs: No Transportation Needs (10/4/2024)    PRAPARE - Transportation     Lack of Transportation (Medical): No     Lack of Transportation (Non-Medical): No   Physical Activity: Not on file   Stress: Not on file   Social Connections: Not on file   Intimate Partner Violence: Not on file   Housing Stability: Low Risk  (10/4/2024)    Housing Stability Vital Sign     Unable to Pay for Housing in the Last Year: No     Number of Times Moved  "in the Last Year: 1     Homeless in the Last Year: No         OBJECTIVE:    BP (!) 194/98 (BP Location: Left arm, Patient Position: Sitting, Cuff Size: Standard)   Pulse 76   Ht 5' 4\" (1.626 m)   Wt 59.8 kg (131 lb 14.4 oz)   SpO2 100%   BMI 22.64 kg/m²   Vitals:    04/17/25 1254   Weight: 59.8 kg (131 lb 14.4 oz)     GEN: No acute distress, Alert and oriented, well appearing  HEENT:External ears normal, oral pharynx clear, mucous membranes moist  EYES: Pupils equal, sclera anicteric, midline, normal conjuctiva  NECK: No JVD, supple, no obvious masses or thryomegaly or goiter  CARDIOVASCULAR:  RRR, No murmur, rub, gallops S1,S2  LUNGS: Clear To auscultation bilaterally, normal effort, no rales, rhonchi, crackles   ABDOMEN:  nondistended,  without obvious organomegaly or ascites  EXTREMITIES/VASCULAR:  No edema. warm an well perfused.  PSYCH: Normal Affect,  linear speech pattern without evidence of psychosis.   NEURO: Grossly intact, moving all extremiteis equal, face symmetric, alert and responsive, no obvious focal defecits   GAIT:  Ambulates normally without difficulty  HEME: No bleeding, bruising, petechia, purpura   SKIN: No significant rashes on visibile skin, warm, no diaphoresis or pallor.     Lab Results:       LABS:      Chemistry        Component Value Date/Time    K 4.6 02/20/2025 1331     02/20/2025 1331    CO2 23 02/20/2025 1331    CO2 27 11/01/2023 1321    BUN 25 02/20/2025 1331    CREATININE 1.17 02/20/2025 1331        Component Value Date/Time    CALCIUM 8.7 02/20/2025 1331    ALKPHOS 62 02/20/2025 1331    AST 20 02/20/2025 1331    ALT 13 02/20/2025 1331            No results found for: \"CHOL\"  Lab Results   Component Value Date    HDL 48 (L) 12/18/2024    HDL 69 03/26/2024    HDL 68 03/09/2024     Lab Results   Component Value Date    LDLCALC 142 (H) 12/18/2024    LDLCALC 125 (H) 03/26/2024    LDLCALC 156 (H) 03/09/2024     Lab Results   Component Value Date    TRIG 89 12/18/2024    TRIG " "123 03/26/2024    TRIG 70 03/09/2024     No results found for: \"CHOLHDL\"    IMAGING: No results found.     Cardiac testing:   No results found for this or any previous visit.    No results found for this or any previous visit.    No results found for this or any previous visit.    No results found for this or any previous visit.          I reviewed and interpreted the following LABS/EKG/TELE/IMAGING and below is summary of my interpretation (if data available):    LABS:    Current EKG and Rhythm Strip:A paced narrow QRS prolongQTc 501ms. Prolonged AV conduction        Interrogation of Pacemaker/Defibrillator/Loop (prior recorded events), etc: High afib w RVR burden, as above       "

## 2025-04-18 ENCOUNTER — PREP FOR PROCEDURE (OUTPATIENT)
Dept: CARDIOLOGY CLINIC | Facility: CLINIC | Age: 80
End: 2025-04-18

## 2025-04-18 DIAGNOSIS — I48.0 PAROXYSMAL ATRIAL FIBRILLATION (HCC): Primary | ICD-10-CM

## 2025-04-18 NOTE — TELEPHONE ENCOUNTER
"Patient scheduled for AFIB PFA Ablation / WATCHMAN on 7/23/25 at [facility] with Dr. Burnett.      Mailed patient instructions.     Patient aware of all general instructions.    Medication holds:   N/A    Blood Thinners:  Xarelto - Keep taking and do not stop.      Labs to be done on 9/1/25:  CMP / CBC (FASTING 8 HOURS)      Thank you,  Ligia \"Adriana\" Ap    "

## 2025-04-18 NOTE — TELEPHONE ENCOUNTER
"Neema,     Please mail instruction letter and labs (CMP/CBC) to patient's home address on file.     Thanks,  Ligia \"Adriana\" Ap    "

## 2025-05-05 ENCOUNTER — RESULTS FOLLOW-UP (OUTPATIENT)
Dept: CARDIOLOGY CLINIC | Facility: CLINIC | Age: 80
End: 2025-05-05

## 2025-05-05 ENCOUNTER — REMOTE DEVICE CLINIC VISIT (OUTPATIENT)
Dept: CARDIOLOGY CLINIC | Facility: CLINIC | Age: 80
End: 2025-05-05
Payer: COMMERCIAL

## 2025-05-05 DIAGNOSIS — Z95.0 PRESENCE OF PERMANENT CARDIAC PACEMAKER: Primary | ICD-10-CM

## 2025-05-05 PROCEDURE — 93296 REM INTERROG EVL PM/IDS: CPT | Performed by: INTERNAL MEDICINE

## 2025-05-05 PROCEDURE — 93294 REM INTERROG EVL PM/LDLS PM: CPT | Performed by: INTERNAL MEDICINE

## 2025-05-05 NOTE — PROGRESS NOTES
Results for orders placed or performed in visit on 05/05/25   Cardiac EP device report    Narrative    MDT DC PM/ACTIVE SYSTEM IS MRI CONDITIONAL  CARELINK TRANSMISSION: BATTERY VOLTAGE ADEQUATE. (11.7 YRS) AP 88%  <1%. ALL AVAILABLE LEAD PARAMETERS WITHIN NORMAL LIMITS. 1 VHR EPISODE DETECTED, RVR NOTED. 17 FAST A & V EPISODES DETECTED. 15 AT/AF EPISODES DETECTED ( 7.2% OF TIME) LONGEST 15 HOURS. PATIENT IS ON XARELTO, AMIO AND CARVEDILOL. SINGLE PVC COUNT 7.1/HOUR. NORMAL DEVICE FUNCTION.---LEES

## 2025-05-07 ENCOUNTER — CLINICAL SUPPORT (OUTPATIENT)
Dept: FAMILY MEDICINE CLINIC | Facility: CLINIC | Age: 80
End: 2025-05-07
Payer: COMMERCIAL

## 2025-05-07 DIAGNOSIS — M81.0 AGE-RELATED OSTEOPOROSIS WITHOUT CURRENT PATHOLOGICAL FRACTURE: Primary | ICD-10-CM

## 2025-05-07 PROCEDURE — 96372 THER/PROPH/DIAG INJ SC/IM: CPT

## 2025-05-29 ENCOUNTER — OFFICE VISIT (OUTPATIENT)
Dept: CARDIOLOGY CLINIC | Facility: MEDICAL CENTER | Age: 80
End: 2025-05-29
Payer: COMMERCIAL

## 2025-05-29 VITALS
HEART RATE: 75 BPM | BODY MASS INDEX: 21.68 KG/M2 | WEIGHT: 127 LBS | OXYGEN SATURATION: 99 % | SYSTOLIC BLOOD PRESSURE: 140 MMHG | DIASTOLIC BLOOD PRESSURE: 70 MMHG | HEIGHT: 64 IN

## 2025-05-29 DIAGNOSIS — R42 DIZZINESS: ICD-10-CM

## 2025-05-29 DIAGNOSIS — I48.0 PAROXYSMAL ATRIAL FIBRILLATION (HCC): Primary | ICD-10-CM

## 2025-05-29 DIAGNOSIS — Z95.0 CARDIAC PACEMAKER IN SITU: ICD-10-CM

## 2025-05-29 DIAGNOSIS — I10 PRIMARY HYPERTENSION: ICD-10-CM

## 2025-05-29 DIAGNOSIS — E78.5 HYPERLIPIDEMIA, UNSPECIFIED HYPERLIPIDEMIA TYPE: ICD-10-CM

## 2025-05-29 PROCEDURE — 99214 OFFICE O/P EST MOD 30 MIN: CPT | Performed by: INTERNAL MEDICINE

## 2025-05-29 RX ORDER — CARVEDILOL 3.12 MG/1
TABLET ORAL
Start: 2025-05-29 | End: 2025-05-29 | Stop reason: SDUPTHER

## 2025-05-29 RX ORDER — CARVEDILOL 3.12 MG/1
TABLET ORAL
Qty: 270 TABLET | Refills: 1 | Status: SHIPPED | OUTPATIENT
Start: 2025-05-29

## 2025-05-29 RX ORDER — AMIODARONE HYDROCHLORIDE 200 MG/1
200 TABLET ORAL DAILY
Qty: 90 TABLET | Refills: 1 | Status: SHIPPED | OUTPATIENT
Start: 2025-05-29

## 2025-05-29 NOTE — PROGRESS NOTES
[facility] [place]   [address]  [address]  [facility] [address]                                            Cardiology Office Follow up  Natali Hernandez, 79 y.o. female  YOB: 1945  MRN: [id number] Encounter: [id number]      PCP - Waqar Hart MD  Referring Provider - Waqar Hart MD    Chief Complaint   Patient presents with   • Follow-up     3 month f/up       Assessment  Cardiac dual chamber pacemaker in-situ ([employer]tronic)  Implanted: 4/10/23 in [place]  4/14/23: tiny apical left penumothorax  Paroxysmal Atrial Fibrillation  3/2024 TTE -LVEF 60%, mild AS, mild AI, mild-moderate MR, moderate TR, mild pulmonary hypertension  Aortic stenosis  Dizziness  Hypertension  Hypothyroidism  On armour thyroid  Osteoporosis    Plan  Paroxysmal Atrial Fibrillation, dizziness  Overview  3/8/24 - initial OV with me  Currently in sinus rhythm, on carvedilol 3.125 mg twice daily, Cardizem  mg daily  She feels she has dizziness since starting on these medications  BP, HR normal and recent PPM implant --> unclear etiology  We will get device check and assess, if no significant A-fib then can consider discontinuing carvedilol  4/25/24 - device check still pending.  Still has on and off dizziness.    Today she is actually in A-fib with heart rate in 100-110  Started on flecainide 50 mg bid  6/2024: Reports intermittent palpitations, for which she takes extra tablet of carvedilol and flecainide on her own and it seems to work for her  Diltiazem discontinued due to patient concerns about dizziness and fall  Flecainide increased to 100 mg twice daily  11/2024: Returns for follow-up  continues to have low burden A-fib on pacemaker checks , and is also having issues with dizziness and near syncope on and off similar to prior  Flecainide increased to 150 mg bid  1/31/25 - device check - 25% Afib burden, continues to have dizziness  4/17/25: saw EP   Flecainide discontinued & started  on amiodarone  Recommended Afib ablation + WATCHMAN (due to frequent falls and increased bleeding risk) -- > setup for 25: palpitations improving with amiodarone  Impression  A-fib burden overall appears to be improvin.4% --> 6% --> 25%  She still having dizziness despite this, and is unclear if the dizziness is associate with the A-fib or more related to low blood pressure/orthostasis   Plan  Continue amiodarone 200 mg daily  Continue carvedilol 6.25 mg in a.m./3.125 mg in p.m.  Continue Xarelto 20 mg daily for now  She will benefit from proceeding with A-fib ablation and Watchman procedure (multiple falls and increased bleeding risk) as discussed with EP   Follow-up with EP regarding same  Monitor palpitations    Dizziness, Cardiac pacemaker in-situ (Medtronic)  Overview  2023: PPM implanted  in [place] for Afib/SSS  No apparent issues with pacemaker thereafter, but she has had 2 falls in the interim  Reviewed device checks   2024  AP 72%  0%, 16 fast AV noted, mild AT/AF episodes noted, 6% burden (decreased from prior 18.3% burden), normal device function  24 -reports ongoing dizziness concerns, PCP tried to increase the dose of carvedilol to 12.5 mg twice daily earlier and after that she had increased dizziness --> causing her to decrease carvedilol back down to 6.25 mg twice daily  2025: Continues to have intermittent dizziness episodes and has had falls, which may be related to labile blood pressure/low at times in the setting of A-fib  Impression  Dizziness possibly related to hypotension from A-fib  Plan  Continue carvedilol 6.25 mg in a.m./3.25 mg in p.m.  Will benefit from A-fib ablation     Aortic stenosis - mild, mitral regurgitation, TR - moderate, PHTN  Overview  3/2024 TTE - mild aortic stenosis and mild to moderate MR/TR, mild PHTN  2024: No gross volume overload or shortness of breath  2024: no shortness of breath  2025: no major shortness of breath  Plan  Monitor  for shortness of breath or edema    Hypertension  Overview  6/2024: Cardizem being discontinued due to patient concerns about dizziness with it  I believe her BP could possibly be getting low intermittently with the combination of carvedilol and Cardizem, which may be the cause of her dizziness  10/4/24 - saw  - BP up to 154/86 --> carvedilol increased to 12.5 mg bid  Within a few days, she was feeling more dizzy after she made additional dietary changes --> self decreased carvedilol to 6.25 mg bid  11/14/24 - /80 despite only taking carvedilol 6.4 mg twice daily  still reports having ongoing dizziness  5/2025: Blood pressure 140/70, reviewed blood pressure log brought in by patient  Blood pressures mostly 130s to 140s systolic, but she had 3 days in the last 2 weeks her blood pressure went up to 160s to 170s  Plan  I suspect her blood pressure is intermittently elevated due to excessive sodium intake on certain days, and I have asked her to think about the food she eats on the days when her blood pressure is high   Continue carvedilol 6.25/3.25 mL twice daily   Avoid hypotension    Hyperlipidemia    She tried low-dose rosuvastatin 5 mg, but was intolerant - reports she could not walk due to muscle issues and as a result has stopped it  6/2024: We will switch to alternative medication with ezetimibe 10 mg daily  11/2024: Tolerating a statin without any problems  Check repeat lipid panel, CMP  Early 2025: she stopped ezetimibe due to concerns about ongoing dizziness and taking more medications  5/2025: prefers to avoid medication for now  Plan  Dietary modifications to lower fat intake  Monitor for now  Will reevaluate initiation of ezetimibe later on    No results found for this visit on 05/29/25.        No orders of the defined types were placed in this encounter.        Return in about 4 months (around 9/29/2025), or if symptoms worsen or fail to improve.      History of Present Illness   79 y.o.  female comes in as a new patient for consultation and establishment of care.  She recently moved to this area from [place] and is now in the process of establishing cardiac care here.    She notes to me that she has had ongoing issues with atrial fibrillation since 2022 when she was in [place].    12/2022: cardiac monitor implanted to further evaluate this.    4/10/2023: pacemaker implant .  Mid-4/2023: Within a week after the pacemaker implant, apparently she fell and broke her left hip --> surgical left hip hemiarthroplasty ([place]).  She continued to otherwise do well from the A-fib standpoint but continued to have dizziness ongoing for several months.  She herself felt that it was related to the medication since it started around that time.    11/2023: Fall while walking her dog outside, despite having a pacemaker in place    Over the past 3 to 4 months she has continued to have constant ongoing dizziness, which is mostly unchanged. She states it is worse when she is outside walking.  She is unable to tell me any other clear triggers of her aggravating factors for this.  It is unchanged with change in position or from sit to stand, and in fact was reportedly present throughout the visit.  She denies any vertigo like sensation.    Interval history - 4/25/2024  She returns for follow-up after 6 weeks.  She completed the echocardiogram and has been taking the medications as prescribed but continues to report on and off symptoms of dizziness.  Today she is back in A-fib on clinical exam, which was subsequently confirmed on ECG as well.     Interval history - 6/19/2024  She returns for follow-up after 2 months.  In the interim, she is doing better and completed the device check in late April.  She has been taking the flecainide and reports improvement in her palpitations.  She does still get some palpitations and she takes extra tablets on her own at these times which helps her symptoms.  No major dizziness, but she  is worried about falling.     Interval history - 11/14/2024  She returns for follow-up after 5 months.  She continues to report intermittent episodes of atrial fibrillation.  She unfortunately continues to have intermittent episodes of dizziness as well.  She saw her PCP in October and her blood pressure was noted to be mildly elevated.  Carvedilol was increased after this, but she felt increasingly dizzy thereafter and as a result self decrease the dose back down to 6.4 mg twice daily.  She reports having an episode of nearly passing out after standing up and trying to do something, and then returning back to sit.  No clear palpitations at that time.    Interval history - 5/29/2025  She returns for follow-up after about 6 months.  In the interim she continues to have episodes of A-fib as well as dizziness and has now seen EP Dr. Burnett.  Her symptoms of dizziness were felt to be consistent with related to A-fib and as a result with increasing burden, she was recommended A-fib ablation and Watchman procedure.      Historical Information   Past Medical History:   Diagnosis Date   • A-fib (HCC)    • Allergic    • Arthritis    • Coronary artery disease    • Disease of thyroid gland    • Diverticulitis of colon    • History of loop recorder    • Hypertension    • Multiple falls    • Pacemaker    • Stroke (HCC)    • Substance abuse (HCC)    • Thyroid disease      Past Surgical History:   Procedure Laterality Date   • APPENDECTOMY     • BILATERAL HIP ARTHROSCOPY Right    • CARDIAC LOOP RECORDER     • CARDIAC PACEMAKER PLACEMENT     • EYE SURGERY     • FRACTURE SURGERY     • HYSTERECTOMY     • JOINT REPLACEMENT       No family history on file.  Current Outpatient Medications   Medication Instructions   • acetaminophen (TYLENOL) 650 mg, Oral, Every 6 hours PRN   • amiodarone 200 mg, Oral, Daily   • carvedilol (COREG) 3.125 mg tablet Take 2 tablets (6.25 mg total) by mouth every morning AND 1 tablet (3.125 mg total) every  "evening.   • ezetimibe (ZETIA) 10 mg, Oral, Daily   • fexofenadine (ALLEGRA) 180 mg, Daily   • methocarbamol (ROBAXIN) 500 mg, Oral, 4 times daily   • rivaroxaban (XARELTO) 20 mg, Oral, Daily with dinner   • romosozumab-aqqg (EVENITY) 210 mg, Subcutaneous, Every 30 days   • romosozumab-aqqg (EVENITY) 210 mg, Subcutaneous, Every 30 days   • solifenacin (VESICARE) 10 mg, Oral, Daily   • thyroid (ARMOUR) 60 mg, Oral, Daily      Allergies   Allergen Reactions   • Other Other (See Comments)     Any \"steroids\" including prednisone and eye steroids; causes muscle weakness, joint pain      Social History     Socioeconomic History   • Marital status:    Tobacco Use   • Smoking status: Former     Current packs/day: 1.00     Average packs/day: 1 pack/day for 15.0 years (15.0 ttl pk-yrs)     Types: Cigarettes     Passive exposure: Never   • Smokeless tobacco: Never   Vaping Use   • Vaping status: Never Used   Substance and Sexual Activity   • Alcohol use: Not Currently   • Drug use: Never   • Sexual activity: Not Currently     Social Drivers of Health     Food Insecurity: No Food Insecurity (10/4/2024)    Nursing - Inadequate Food Risk Classification    • Worried About Running Out of Food in the Last Year: Never true    • Ran Out of Food in the Last Year: Never true   Transportation Needs: No Transportation Needs (10/4/2024)    PRAPARE - Transportation    • Lack of Transportation (Medical): No    • Lack of Transportation (Non-Medical): No   Housing Stability: Low Risk  (10/4/2024)    Housing Stability Vital Sign    • Unable to Pay for Housing in the Last Year: No    • Number of Times Moved in the Last Year: 1    • Homeless in the Last Year: No        Review of Systems   All other systems reviewed and are negative.        Vitals:  Vitals:    05/29/25 1231   BP: 140/70   BP Location: Left arm   Patient Position: Sitting   Cuff Size: Adult   Pulse: 75   SpO2: 99%   Weight: 57.6 kg (127 lb)   Height: 5' 4\" (1.626 m) "       BMI - Body mass index is 21.8 kg/m².  Wt Readings from Last 7 Encounters:   05/29/25 57.6 kg (127 lb)   04/17/25 59.8 kg (131 lb 14.4 oz)   02/20/25 61.7 kg (136 lb 0.4 oz)   12/09/24 62.6 kg (138 lb)   11/14/24 62.1 kg (137 lb)   11/06/24 63 kg (138 lb 12.8 oz)   10/18/24 63 kg (139 lb)       Physical Exam  Vitals and nursing note reviewed.   Constitutional:       General: She is not in acute distress.     Appearance: Normal appearance. She is well-developed. She is not ill-appearing.   HENT:      Head: Normocephalic and atraumatic.      Nose: No congestion.     Eyes:      General: No scleral icterus.     Conjunctiva/sclera: Conjunctivae normal.     Neck:      Vascular: No carotid bruit or JVD.     Cardiovascular:      Rate and Rhythm: Normal rate and regular rhythm.      Pulses: Normal pulses.      Heart sounds: Normal heart sounds. No murmur heard.     No friction rub. No gallop.   Pulmonary:      Effort: Pulmonary effort is normal. No respiratory distress.      Breath sounds: Normal breath sounds. No rales.   Abdominal:      General: There is no distension.      Palpations: Abdomen is soft.      Tenderness: There is no abdominal tenderness.     Musculoskeletal:         General: No swelling or tenderness.      Cervical back: Neck supple.      Right lower leg: No edema.      Left lower leg: No edema.     Skin:     General: Skin is warm.     Neurological:      General: No focal deficit present.      Mental Status: She is alert and oriented to person, place, and time. Mental status is at baseline.     Psychiatric:         Mood and Affect: Mood normal.         Behavior: Behavior normal.         Thought Content: Thought content normal.           Labs:  CBC:   Lab Results   Component Value Date    WBC 4.84 02/20/2025    RBC 3.29 (L) 02/20/2025    HGB 9.9 (L) 02/20/2025    HCT 30.9 (L) 02/20/2025    MCV 94 02/20/2025     02/20/2025    RDW 14.0 02/20/2025       CMP:   Lab Results   Component Value Date    K  "4.6 02/20/2025     02/20/2025    CO2 23 02/20/2025    BUN 25 02/20/2025    CREATININE 1.17 02/20/2025    EGFR 44 02/20/2025    GLUCOSE 98 11/01/2023    CALCIUM 8.7 02/20/2025    AST 20 02/20/2025    ALT 13 02/20/2025    ALKPHOS 62 02/20/2025       Magnesium:  Lab Results   Component Value Date    MG 2.4 03/26/2024       Lipid Profile:   Lab Results   Component Value Date    HDL 48 (L) 12/18/2024    TRIG 89 12/18/2024    LDLCALC 142 (H) 12/18/2024       Thyroid Studies:   Lab Results   Component Value Date    [id number] 0.617 12/18/2024       A1c:  No components found for: \"HGA1C\"    INR:  No results found for: \"INR\"5    Cardiac testing:   No results found for this or any previous visit.    No results found for this or any previous visit.    No results found for this or any previous visit.    No results found for this or any previous visit.      Cardiac EP device report  MDT DC PM/ACTIVE SYSTEM IS MRI CONDITIONAL  CARELINK TRANSMISSION: BATTERY VOLTAGE ADEQUATE. (11.7 YRS) AP 88%  <1%. ALL AVAILABLE LEAD PARAMETERS WITHIN NORMAL LIMITS. 1 VHR EPISODE DETECTED, RVR NOTED. 17 FAST A & V EPISODES DETECTED. 15 AT/AF EPISODES DETECTED ( 7.2% OF TIME) LONGEST 15 HOURS. PATIENT IS ON XARELTO, AMIO AND CARVEDILOL. SINGLE PVC COUNT 7.1/HOUR. NORMAL DEVICE FUNCTION.---LEES         "

## 2025-05-31 DIAGNOSIS — I48.0 PAROXYSMAL ATRIAL FIBRILLATION (HCC): ICD-10-CM

## 2025-05-31 DIAGNOSIS — N32.81 OAB (OVERACTIVE BLADDER): ICD-10-CM

## 2025-06-01 RX ORDER — SOLIFENACIN SUCCINATE 10 MG/1
10 TABLET, FILM COATED ORAL DAILY
Qty: 90 TABLET | Refills: 1 | Status: SHIPPED | OUTPATIENT
Start: 2025-06-01

## 2025-06-04 RX ORDER — CARVEDILOL 3.12 MG/1
3.12 TABLET ORAL 2 TIMES DAILY WITH MEALS
Qty: 180 TABLET | Refills: 1 | OUTPATIENT
Start: 2025-06-04

## 2025-06-05 DIAGNOSIS — E03.4 HYPOTHYROIDISM DUE TO ACQUIRED ATROPHY OF THYROID: ICD-10-CM

## 2025-06-06 ENCOUNTER — TELEPHONE (OUTPATIENT)
Age: 80
End: 2025-06-06

## 2025-06-06 DIAGNOSIS — E03.4 HYPOTHYROIDISM DUE TO ACQUIRED ATROPHY OF THYROID: ICD-10-CM

## 2025-06-06 RX ORDER — THYROID,PORK 60 MG
60 TABLET ORAL DAILY
Qty: 90 TABLET | Refills: 1 | Status: SHIPPED | OUTPATIENT
Start: 2025-06-06 | End: 2025-06-06 | Stop reason: CLARIF

## 2025-06-06 RX ORDER — THYROID 60 MG/1
60 TABLET ORAL DAILY
Qty: 90 TABLET | Refills: 1 | Status: SHIPPED | OUTPATIENT
Start: 2025-06-06

## 2025-06-06 NOTE — TELEPHONE ENCOUNTER
Pt calling in to change her pharmacy to [facility]. Requesting to have her script for her thyroid medication sent there, instead of [facility]

## 2025-06-11 ENCOUNTER — CLINICAL SUPPORT (OUTPATIENT)
Dept: FAMILY MEDICINE CLINIC | Facility: CLINIC | Age: 80
End: 2025-06-11
Payer: COMMERCIAL

## 2025-06-11 DIAGNOSIS — M81.0 AGE-RELATED OSTEOPOROSIS WITHOUT CURRENT PATHOLOGICAL FRACTURE: Primary | ICD-10-CM

## 2025-06-11 DIAGNOSIS — M81.0 OSTEOPOROSIS, POST-MENOPAUSAL: Primary | ICD-10-CM

## 2025-06-11 PROCEDURE — 96372 THER/PROPH/DIAG INJ SC/IM: CPT

## 2025-06-23 ENCOUNTER — OFFICE VISIT (OUTPATIENT)
Dept: FAMILY MEDICINE CLINIC | Facility: CLINIC | Age: 80
End: 2025-06-23
Payer: COMMERCIAL

## 2025-06-23 VITALS
TEMPERATURE: 97.7 F | BODY MASS INDEX: 21.85 KG/M2 | DIASTOLIC BLOOD PRESSURE: 70 MMHG | WEIGHT: 128 LBS | HEIGHT: 64 IN | OXYGEN SATURATION: 99 % | HEART RATE: 64 BPM | SYSTOLIC BLOOD PRESSURE: 122 MMHG | RESPIRATION RATE: 18 BRPM

## 2025-06-23 DIAGNOSIS — E78.00 PURE HYPERCHOLESTEROLEMIA: ICD-10-CM

## 2025-06-23 DIAGNOSIS — M81.0 AGE-RELATED OSTEOPOROSIS WITHOUT CURRENT PATHOLOGICAL FRACTURE: Primary | ICD-10-CM

## 2025-06-23 DIAGNOSIS — I10 PRIMARY HYPERTENSION: ICD-10-CM

## 2025-06-23 DIAGNOSIS — N18.30 STAGE 3 CHRONIC KIDNEY DISEASE, UNSPECIFIED WHETHER STAGE 3A OR 3B CKD (HCC): ICD-10-CM

## 2025-06-23 DIAGNOSIS — I48.0 PAROXYSMAL ATRIAL FIBRILLATION (HCC): ICD-10-CM

## 2025-06-23 DIAGNOSIS — E03.4 HYPOTHYROIDISM DUE TO ACQUIRED ATROPHY OF THYROID: ICD-10-CM

## 2025-06-23 PROCEDURE — G2211 COMPLEX E/M VISIT ADD ON: HCPCS | Performed by: FAMILY MEDICINE

## 2025-06-23 PROCEDURE — 99214 OFFICE O/P EST MOD 30 MIN: CPT | Performed by: FAMILY MEDICINE

## 2025-06-23 RX ORDER — DENOSUMAB 60 MG/ML
60 INJECTION SUBCUTANEOUS ONCE
Qty: 1 ML | Refills: 0 | Status: SHIPPED | OUTPATIENT
Start: 2025-06-23 | End: 2025-06-23

## 2025-06-23 RX ORDER — CARVEDILOL 3.12 MG/1
TABLET ORAL
Qty: 270 TABLET | Refills: 1 | Status: SHIPPED | OUTPATIENT
Start: 2025-06-23

## 2025-06-23 NOTE — ASSESSMENT & PLAN NOTE
Patient  is stable with current medication and we discussed a low fat low cholesterol diet. Weight loss also discussed for this will help lower cholesterol also. Recheck lipids in 6 months.   Orders:  •  Lipid Panel with Direct LDL reflex; Future   Valtrex Pregnancy And Lactation Text: this medication is Pregnancy Category B and is considered safe during pregnancy. This medication is not directly found in breast milk but it's metabolite acyclovir is present.

## 2025-06-23 NOTE — ASSESSMENT & PLAN NOTE
Lab Results   Component Value Date    EGFR 44 02/20/2025    EGFR 53 12/18/2024    EGFR 49 05/06/2024    CREATININE 1.17 02/20/2025    CREATININE 1.00 12/18/2024    CREATININE 1.07 05/06/2024   Pt to avoid NSAIDs and any IV dyes. Patient to follow up eoither with nephrology or  with us for  further  monitoring of  renal function.

## 2025-06-23 NOTE — ASSESSMENT & PLAN NOTE
Patient to continue with weight bearing exercises as much as possible and oral intake of 1200 mg of calcium with 800 IU of Vit D to maximize bone protection. Pt  to continue  with DEXA scan every 2 years to reassess. All qustions regarding this problem answered today to patient satisfaction.   Orders:  •  denosumab (Prolia) 60 mg/mL; Inject 1 mL (60 mg total) under the skin once for 1 dose

## 2025-06-23 NOTE — PROGRESS NOTES
Name: Natali Hernandez      : 1945      MRN: [id number]  Encounter Provider: Waqar Hart MD  Encounter Date: 2025   Encounter department: [facility]  :  Assessment & Plan  Age-related osteoporosis without current pathological fracture  Patient to continue with weight bearing exercises as much as possible and oral intake of 1200 mg of calcium with 800 IU of Vit D to maximize bone protection. Pt  to continue  with DEXA scan every 2 years to reassess. All qustions regarding this problem answered today to patient satisfaction.   Orders:  •  denosumab (Prolia) 60 mg/mL; Inject 1 mL (60 mg total) under the skin once for 1 dose    Primary hypertension  Patient is stable with current anti-hypertensive medicine and continue to follow a low sodium diet and take current medication. All questions about this condition were answered today.        Pure hypercholesterolemia  Patient  is stable with current medication and we discussed a low fat low cholesterol diet. Weight loss also discussed for this will help lower cholesterol also. Recheck lipids in 6 months.   Orders:  •  Lipid Panel with Direct LDL reflex; Future    Stage 3 chronic kidney disease, unspecified whether stage 3a or 3b CKD (HCC)  Lab Results   Component Value Date    EGFR 44 2025    EGFR 53 2024    EGFR 49 2024    CREATININE 1.17 2025    CREATININE 1.00 2024    CREATININE 1.07 2024   Pt to avoid NSAIDs and any IV dyes. Patient to follow up eoither with nephrology or  with us for  further  monitoring of  renal function.        Paroxysmal atrial fibrillation (HCC)    Orders:  •  carvedilol (COREG) 3.125 mg tablet; Take 2 tablets (6.25 mg total) by mouth every morning AND 1 tablet (3.125 mg total) every evening.    Hypothyroidism due to acquired atrophy of thyroid    Orders:  •  TSH, 3rd generation with Free T4 reflex; Future          Falls Plan of Care: balance, strength, and gait  "training instructions were provided. Home safety education provided.     Urinary Incontinence Plan of Care: counseling topics discussed: practice Kegel (pelvic floor strengthening) exercises, use restroom every 2 hours, limit alcohol, caffeine, spicy foods, and acidic foods, limiting fluid intake 3-4 hours before bed, weight loss, preventing constipation and limiting fluid intake to 60 oz. per day.   History of Present Illness   HPI  Review of Systems    Objective   /70 (BP Location: Left arm, Patient Position: Sitting, Cuff Size: Standard)   Pulse 64   Temp 97.7 °F (36.5 °C) (Temporal)   Resp 18   Ht 5' 4\" (1.626 m)   Wt 58.1 kg (128 lb)   SpO2 99%   BMI 21.97 kg/m²      Physical Exam    "

## 2025-07-09 ENCOUNTER — APPOINTMENT (OUTPATIENT)
Dept: LAB | Facility: MEDICAL CENTER | Age: 80
End: 2025-07-09
Attending: INTERNAL MEDICINE
Payer: COMMERCIAL

## 2025-07-09 DIAGNOSIS — E03.4 HYPOTHYROIDISM DUE TO ACQUIRED ATROPHY OF THYROID: ICD-10-CM

## 2025-07-09 DIAGNOSIS — E78.00 PURE HYPERCHOLESTEROLEMIA: ICD-10-CM

## 2025-07-09 LAB
ALBUMIN SERPL BCG-MCNC: 4.4 G/DL (ref 3.5–5)
ALP SERPL-CCNC: 61 U/L (ref 34–104)
ALT SERPL W P-5'-P-CCNC: 15 U/L (ref 7–52)
ANION GAP SERPL CALCULATED.3IONS-SCNC: 8 MMOL/L (ref 4–13)
AST SERPL W P-5'-P-CCNC: 22 U/L (ref 13–39)
BASOPHILS # BLD AUTO: 0.04 THOUSANDS/ÂΜL (ref 0–0.1)
BASOPHILS NFR BLD AUTO: 2 % (ref 0–1)
BILIRUB SERPL-MCNC: 0.45 MG/DL (ref 0.2–1)
BUN SERPL-MCNC: 28 MG/DL (ref 5–25)
CALCIUM SERPL-MCNC: 9.4 MG/DL (ref 8.4–10.2)
CHLORIDE SERPL-SCNC: 104 MMOL/L (ref 96–108)
CHOLEST SERPL-MCNC: 240 MG/DL (ref ?–200)
CO2 SERPL-SCNC: 25 MMOL/L (ref 21–32)
CREAT SERPL-MCNC: 1.31 MG/DL (ref 0.6–1.3)
EOSINOPHIL # BLD AUTO: 0 THOUSAND/ÂΜL (ref 0–0.61)
EOSINOPHIL NFR BLD AUTO: 0 % (ref 0–6)
ERYTHROCYTE [DISTWIDTH] IN BLOOD BY AUTOMATED COUNT: 14.7 % (ref 11.6–15.1)
GFR SERPL CREATININE-BSD FRML MDRD: 38 ML/MIN/1.73SQ M
GLUCOSE SERPL-MCNC: 85 MG/DL (ref 65–140)
HCT VFR BLD AUTO: 32 % (ref 34.8–46.1)
HDLC SERPL-MCNC: 73 MG/DL
HGB BLD-MCNC: 10.1 G/DL (ref 11.5–15.4)
IMM GRANULOCYTES # BLD AUTO: 0.01 THOUSAND/UL (ref 0–0.2)
IMM GRANULOCYTES NFR BLD AUTO: 0 % (ref 0–2)
LDLC SERPL CALC-MCNC: 151 MG/DL (ref 0–100)
LYMPHOCYTES # BLD AUTO: 0.63 THOUSANDS/ÂΜL (ref 0.6–4.47)
LYMPHOCYTES NFR BLD AUTO: 24 % (ref 14–44)
MCH RBC QN AUTO: 30.3 PG (ref 26.8–34.3)
MCHC RBC AUTO-ENTMCNC: 31.6 G/DL (ref 31.4–37.4)
MCV RBC AUTO: 96 FL (ref 82–98)
MONOCYTES # BLD AUTO: 0.45 THOUSAND/ÂΜL (ref 0.17–1.22)
MONOCYTES NFR BLD AUTO: 17 % (ref 4–12)
NEUTROPHILS # BLD AUTO: 1.52 THOUSANDS/ÂΜL (ref 1.85–7.62)
NEUTS SEG NFR BLD AUTO: 57 % (ref 43–75)
NRBC BLD AUTO-RTO: 0 /100 WBCS
PLATELET # BLD AUTO: 142 THOUSANDS/UL (ref 149–390)
PMV BLD AUTO: 11.7 FL (ref 8.9–12.7)
POTASSIUM SERPL-SCNC: 4.5 MMOL/L (ref 3.5–5.3)
PROT SERPL-MCNC: 7.8 G/DL (ref 6.4–8.4)
RBC # BLD AUTO: 3.33 MILLION/UL (ref 3.81–5.12)
SODIUM SERPL-SCNC: 137 MMOL/L (ref 135–147)
TRIGL SERPL-MCNC: 82 MG/DL (ref ?–150)
TSH SERPL DL<=0.05 MIU/L-ACNC: 0.62 UIU/ML (ref 0.45–4.5)
WBC # BLD AUTO: 2.65 THOUSAND/UL (ref 4.31–10.16)

## 2025-07-09 PROCEDURE — 84443 ASSAY THYROID STIM HORMONE: CPT

## 2025-07-09 PROCEDURE — 80061 LIPID PANEL: CPT

## 2025-07-11 ENCOUNTER — OFFICE VISIT (OUTPATIENT)
Dept: FAMILY MEDICINE CLINIC | Facility: CLINIC | Age: 80
End: 2025-07-11
Payer: COMMERCIAL

## 2025-07-11 VITALS
TEMPERATURE: 97.7 F | BODY MASS INDEX: 21.85 KG/M2 | OXYGEN SATURATION: 96 % | HEART RATE: 68 BPM | SYSTOLIC BLOOD PRESSURE: 138 MMHG | DIASTOLIC BLOOD PRESSURE: 62 MMHG | HEIGHT: 64 IN | WEIGHT: 128 LBS

## 2025-07-11 DIAGNOSIS — I10 PRIMARY HYPERTENSION: ICD-10-CM

## 2025-07-11 DIAGNOSIS — M81.0 AGE-RELATED OSTEOPOROSIS WITHOUT CURRENT PATHOLOGICAL FRACTURE: Primary | ICD-10-CM

## 2025-07-11 DIAGNOSIS — N18.30 STAGE 3 CHRONIC KIDNEY DISEASE, UNSPECIFIED WHETHER STAGE 3A OR 3B CKD (HCC): ICD-10-CM

## 2025-07-11 DIAGNOSIS — E78.00 PURE HYPERCHOLESTEROLEMIA: ICD-10-CM

## 2025-07-11 PROCEDURE — 99214 OFFICE O/P EST MOD 30 MIN: CPT | Performed by: FAMILY MEDICINE

## 2025-07-11 PROCEDURE — G2211 COMPLEX E/M VISIT ADD ON: HCPCS | Performed by: FAMILY MEDICINE

## 2025-07-11 NOTE — PROGRESS NOTES
Name: Natali Hernandez      : 1945      MRN: [id number]  Encounter Provider: Waqar Hart MD  Encounter Date: 2025   Encounter department: [facility]  :  Assessment & Plan  Age-related osteoporosis without current pathological fracture  Patient to continue with weight bearing exercises as much as possible and oral intake of 1200 mg of calcium with 800 IU of Vit D to maximize bone protection. Pt  to continue  with DEXA scan every 2 years to reassess. All qustions regarding this problem answered today to patient satisfaction.        Primary hypertension  Patient is stable with current anti-hypertensive medicine and continue to follow a low sodium diet and take current medication. All questions about this condition were answered today.        Pure hypercholesterolemia  Needs statin       Stage 3 chronic kidney disease, unspecified whether stage 3a or 3b CKD ([facility])  Lab Results   Component Value Date    EGFR 38 2025    EGFR 44 2025    EGFR 53 2024    CREATININE 1.31 (H) 2025    CREATININE 1.17 2025    CREATININE 1.00 2024   Pt to avoid NSAIDs and any IV dyes. Patient to follow up eoither with nephrology or  with us for  further  monitoring of  renal function.              Falls Plan of Care: balance, strength, and gait training instructions were provided. Home safety education provided.     Urinary Incontinence Plan of Care: counseling topics discussed: practice Kegel (pelvic floor strengthening) exercises, use restroom every 2 hours, limit alcohol, caffeine, spicy foods, and acidic foods, limiting fluid intake 3-4 hours before bed, weight loss, preventing constipation and limiting fluid intake to 60 oz. per day.   History of Present Illness   Is a 79-year-old female today for checkup for multiple medical problems.  Patient with history of atrial fibrillation as well as osteoporosis and hyperlipidemia.  Patient is discussing with me about  "changing over from the Evenity to the Prolia and we will evaluate that today and I will talk to our staff about how that is occurring.  Patient also is here to talk about her cholesterol.  I discussed with patient about her high cholesterol and she said she already talked to her cardiologist about this and she had been on Crestor previously and had a lot of problems with myalgias and had to stop that since then her cardiologist has started her on Zetia but she is just starting to take this right now at this time I am not can change any of her plans and I will let the cardiologist give her the Zetia and we will see how she does with her cholesterol at next time she checks it.    Review of Systems   Constitutional:  Negative for activity change, appetite change, fatigue and fever.   HENT:  Negative for congestion, ear pain, postnasal drip, rhinorrhea, sinus pressure, sinus pain, sneezing and sore throat.    Eyes:  Negative for pain and redness.   Respiratory:  Negative for apnea, cough, chest tightness, shortness of breath and wheezing.    Cardiovascular:  Negative for chest pain, palpitations and leg swelling.   Gastrointestinal:  Negative for abdominal pain, constipation, diarrhea, nausea and vomiting.   Endocrine: Negative for cold intolerance and heat intolerance.   Genitourinary:  Negative for difficulty urinating, dysuria, frequency, hematuria and urgency.   Musculoskeletal:  Negative for arthralgias, back pain, gait problem and myalgias.   Skin:  Negative for rash.   Neurological:  Negative for dizziness, speech difficulty, weakness, numbness and headaches.   Hematological:  Does not bruise/bleed easily.   Psychiatric/Behavioral:  Negative for agitation, confusion and hallucinations.        Objective   /62 (BP Location: Left arm, Patient Position: Sitting, Cuff Size: Standard)   Pulse 68   Temp 97.7 °F (36.5 °C) (Temporal)   Ht 5' 4\" (1.626 m)   Wt 58.1 kg (128 lb)   SpO2 96%   BMI 21.97 kg/m²    "   Physical Exam  Constitutional:       Appearance: Normal appearance. She is not ill-appearing.   HENT:      Head: Normocephalic and atraumatic.      Right Ear: Tympanic membrane normal.      Left Ear: Tympanic membrane normal.      Nose: Nose normal.      Mouth/Throat:      Mouth: Mucous membranes are moist.     Eyes:      Extraocular Movements: Extraocular movements intact.      Conjunctiva/sclera: Conjunctivae normal.      Pupils: Pupils are equal, round, and reactive to light.       Cardiovascular:      Rate and Rhythm: Normal rate and regular rhythm.   Pulmonary:      Effort: Pulmonary effort is normal. No respiratory distress.      Breath sounds: Normal breath sounds. No wheezing.   Abdominal:      General: Bowel sounds are normal.      Palpations: Abdomen is soft.      Tenderness: There is no abdominal tenderness.     Musculoskeletal:         General: No tenderness.      Cervical back: Normal range of motion and neck supple.      Right lower leg: No edema.      Left lower leg: No edema.     Skin:     General: Skin is warm and dry.     Neurological:      Mental Status: She is alert and oriented to person, place, and time.     Psychiatric:         Mood and Affect: Mood normal.         Behavior: Behavior normal.         Thought Content: Thought content normal.         Judgment: Judgment normal.        - - -

## 2025-07-11 NOTE — ASSESSMENT & PLAN NOTE
Lab Results   Component Value Date    EGFR 38 07/09/2025    EGFR 44 02/20/2025    EGFR 53 12/18/2024    CREATININE 1.31 (H) 07/09/2025    CREATININE 1.17 02/20/2025    CREATININE 1.00 12/18/2024   Pt to avoid NSAIDs and any IV dyes. Patient to follow up eoither with nephrology or  with us for  further  monitoring of  renal function.

## 2025-07-23 ENCOUNTER — HOSPITAL ENCOUNTER (OUTPATIENT)
Dept: NON INVASIVE DIAGNOSTICS | Facility: HOSPITAL | Age: 80
Discharge: HOME | DRG: 317 | End: 2025-07-23
Payer: COMMERCIAL

## 2025-07-23 ENCOUNTER — ANESTHESIA EVENT (INPATIENT)
Dept: NON INVASIVE DIAGNOSTICS | Facility: HOSPITAL | Age: 80
DRG: 317 | End: 2025-07-23
Payer: COMMERCIAL

## 2025-07-23 ENCOUNTER — HOSPITAL ENCOUNTER (INPATIENT)
Facility: HOSPITAL | Age: 80
LOS: 1 days | Discharge: HOME | DRG: 317 | End: 2025-07-24
Attending: INTERNAL MEDICINE | Admitting: INTERNAL MEDICINE
Payer: COMMERCIAL

## 2025-07-23 DIAGNOSIS — E78.00 PURE HYPERCHOLESTEROLEMIA: ICD-10-CM

## 2025-07-23 DIAGNOSIS — I48.0 PAROXYSMAL ATRIAL FIBRILLATION (HCC): ICD-10-CM

## 2025-07-23 DIAGNOSIS — Z95.818 PRESENCE OF WATCHMAN LEFT ATRIAL APPENDAGE CLOSURE DEVICE: Primary | ICD-10-CM

## 2025-07-23 DIAGNOSIS — I10 PRIMARY HYPERTENSION: ICD-10-CM

## 2025-07-23 DIAGNOSIS — I48.91 ATRIAL FIBRILLATION (HCC): ICD-10-CM

## 2025-07-23 PROBLEM — Z86.79 STATUS POST ABLATION OF ATRIAL FIBRILLATION: Status: ACTIVE | Noted: 2025-07-23

## 2025-07-23 PROBLEM — Z98.890 STATUS POST ABLATION OF ATRIAL FIBRILLATION: Status: ACTIVE | Noted: 2025-07-23

## 2025-07-23 LAB
ANION GAP SERPL CALCULATED.3IONS-SCNC: 6 MMOL/L (ref 4–13)
ATRIAL RATE: 60 BPM
BUN SERPL-MCNC: 34 MG/DL (ref 5–25)
CALCIUM SERPL-MCNC: 10.2 MG/DL (ref 8.4–10.2)
CHLORIDE SERPL-SCNC: 107 MMOL/L (ref 96–108)
CO2 SERPL-SCNC: 26 MMOL/L (ref 21–32)
CREAT SERPL-MCNC: 1.15 MG/DL (ref 0.6–1.3)
ERYTHROCYTE [DISTWIDTH] IN BLOOD BY AUTOMATED COUNT: 15 % (ref 11.6–15.1)
GFR SERPL CREATININE-BSD FRML MDRD: 45 ML/MIN/1.73SQ M
GLUCOSE P FAST SERPL-MCNC: 89 MG/DL (ref 65–99)
GLUCOSE SERPL-MCNC: 89 MG/DL (ref 65–140)
HCT VFR BLD AUTO: 35.1 % (ref 34.8–46.1)
HGB BLD-MCNC: 11 G/DL (ref 11.5–15.4)
INR PPP: 2.27 (ref 0.85–1.19)
KCT BLD-ACNC: 370 SEC (ref 89–137)
KCT BLD-ACNC: 389 SEC (ref 89–137)
KCT BLD-ACNC: 396 SEC (ref 89–137)
KCT BLD-ACNC: 440 SEC (ref 89–137)
MCH RBC QN AUTO: 30.1 PG (ref 26.8–34.3)
MCHC RBC AUTO-ENTMCNC: 31.3 G/DL (ref 31.4–37.4)
MCV RBC AUTO: 96 FL (ref 82–98)
PLATELET # BLD AUTO: 141 THOUSANDS/UL (ref 149–390)
PMV BLD AUTO: 10.5 FL (ref 8.9–12.7)
POTASSIUM SERPL-SCNC: 4.7 MMOL/L (ref 3.5–5.3)
PR INTERVAL: 188 MS
PROTHROMBIN TIME: 25 SECONDS (ref 12.3–15)
QRS AXIS: 2 DEGREES
QRSD INTERVAL: 80 MS
QT INTERVAL: 446 MS
QTC INTERVAL: 446 MS
RBC # BLD AUTO: 3.66 MILLION/UL (ref 3.81–5.12)
SODIUM SERPL-SCNC: 139 MMOL/L (ref 135–147)
SPECIMEN SOURCE: ABNORMAL
T WAVE AXIS: 161 DEGREES
VENTRICULAR RATE: 60 BPM
WBC # BLD AUTO: 3.17 THOUSAND/UL (ref 4.31–10.16)

## 2025-07-23 PROCEDURE — 85027 COMPLETE CBC AUTOMATED: CPT | Performed by: PHYSICIAN ASSISTANT

## 2025-07-23 PROCEDURE — C1733 CATH, EP, OTHR THAN COOL-TIP: HCPCS | Performed by: INTERNAL MEDICINE

## 2025-07-23 PROCEDURE — 93656 COMPRE EP EVAL ABLTJ ATR FIB: CPT | Performed by: INTERNAL MEDICINE

## 2025-07-23 PROCEDURE — 93005 ELECTROCARDIOGRAM TRACING: CPT

## 2025-07-23 PROCEDURE — 85347 COAGULATION TIME ACTIVATED: CPT

## 2025-07-23 PROCEDURE — 33340 PERQ CLSR TCAT L ATR APNDGE: CPT | Performed by: INTERNAL MEDICINE

## 2025-07-23 PROCEDURE — C1730 CATH, EP, 19 OR FEW ELECT: HCPCS | Performed by: INTERNAL MEDICINE

## 2025-07-23 PROCEDURE — C1769 GUIDE WIRE: HCPCS | Performed by: INTERNAL MEDICINE

## 2025-07-23 PROCEDURE — 02583ZZ DESTRUCTION OF CONDUCTION MECHANISM, PERCUTANEOUS APPROACH: ICD-10-PCS | Performed by: INTERNAL MEDICINE

## 2025-07-23 PROCEDURE — C1894 INTRO/SHEATH, NON-LASER: HCPCS | Performed by: INTERNAL MEDICINE

## 2025-07-23 PROCEDURE — 02583ZF DESTRUCTION OF CONDUCTION MECHANISM USING IRREVERSIBLE ELECTROPORATION, PERCUTANEOUS APPROACH: ICD-10-PCS | Performed by: INTERNAL MEDICINE

## 2025-07-23 PROCEDURE — C1760 CLOSURE DEV, VASC: HCPCS | Performed by: INTERNAL MEDICINE

## 2025-07-23 PROCEDURE — 93655 ICAR CATH ABLTJ DSCRT ARRHYT: CPT | Performed by: INTERNAL MEDICINE

## 2025-07-23 PROCEDURE — B24BZZ4 ULTRASONOGRAPHY OF HEART WITH AORTA, TRANSESOPHAGEAL: ICD-10-PCS | Performed by: STUDENT IN AN ORGANIZED HEALTH CARE EDUCATION/TRAINING PROGRAM

## 2025-07-23 PROCEDURE — 93010 ELECTROCARDIOGRAM REPORT: CPT | Performed by: INTERNAL MEDICINE

## 2025-07-23 PROCEDURE — 02L73DK OCCLUSION OF LEFT ATRIAL APPENDAGE WITH INTRALUMINAL DEVICE, PERCUTANEOUS APPROACH: ICD-10-PCS | Performed by: INTERNAL MEDICINE

## 2025-07-23 PROCEDURE — NC001 PR NO CHARGE: Performed by: PHYSICIAN ASSISTANT

## 2025-07-23 PROCEDURE — 76376 3D RENDER W/INTRP POSTPROCES: CPT

## 2025-07-23 PROCEDURE — 93355 ECHO TRANSESOPHAGEAL (TEE): CPT

## 2025-07-23 PROCEDURE — 85610 PROTHROMBIN TIME: CPT | Performed by: PHYSICIAN ASSISTANT

## 2025-07-23 PROCEDURE — C1887 CATHETER, GUIDING: HCPCS | Performed by: INTERNAL MEDICINE

## 2025-07-23 PROCEDURE — C1759 CATH, INTRA ECHOCARDIOGRAPHY: HCPCS | Performed by: INTERNAL MEDICINE

## 2025-07-23 PROCEDURE — 02K83ZZ MAP CONDUCTION MECHANISM, PERCUTANEOUS APPROACH: ICD-10-PCS | Performed by: INTERNAL MEDICINE

## 2025-07-23 PROCEDURE — 76937 US GUIDE VASCULAR ACCESS: CPT | Performed by: INTERNAL MEDICINE

## 2025-07-23 PROCEDURE — 80048 BASIC METABOLIC PNL TOTAL CA: CPT | Performed by: PHYSICIAN ASSISTANT

## 2025-07-23 PROCEDURE — C2630 CATH, EP, COOL-TIP: HCPCS | Performed by: INTERNAL MEDICINE

## 2025-07-23 PROCEDURE — C1766 INTRO/SHEATH,STRBLE,NON-PEEL: HCPCS | Performed by: INTERNAL MEDICINE

## 2025-07-23 PROCEDURE — C1893 INTRO/SHEATH, FIXED,NON-PEEL: HCPCS | Performed by: INTERNAL MEDICINE

## 2025-07-23 DEVICE — PERCLOSE™ PROSTYLE™ SUTURE-MEDIATED CLOSURE AND REPAIR SYSTEM
Type: IMPLANTABLE DEVICE | Site: GROIN | Status: FUNCTIONAL
Brand: PERCLOSE™ PROSTYLE™

## 2025-07-23 DEVICE — IMPLANTABLE DEVICE: Type: IMPLANTABLE DEVICE | Site: HEART | Status: FUNCTIONAL

## 2025-07-23 DEVICE — IMPLANTABLE DEVICE: Type: IMPLANTABLE DEVICE | Site: GROIN | Status: FUNCTIONAL

## 2025-07-23 RX ORDER — CARVEDILOL 3.12 MG/1
3.12 TABLET ORAL EVERY EVENING
Status: DISCONTINUED | OUTPATIENT
Start: 2025-07-23 | End: 2025-07-23

## 2025-07-23 RX ORDER — SODIUM CHLORIDE 9 MG/ML
20 INJECTION, SOLUTION INTRAVENOUS ONCE
Status: DISCONTINUED | OUTPATIENT
Start: 2025-07-23 | End: 2025-07-23 | Stop reason: HOSPADM

## 2025-07-23 RX ORDER — THYROID 60 MG/1
60 TABLET ORAL DAILY
Status: DISCONTINUED | OUTPATIENT
Start: 2025-07-24 | End: 2025-07-23

## 2025-07-23 RX ORDER — SODIUM CHLORIDE 9 MG/ML
20 INJECTION, SOLUTION INTRAVENOUS CONTINUOUS
Status: DISCONTINUED | OUTPATIENT
Start: 2025-07-23 | End: 2025-07-23

## 2025-07-23 RX ORDER — HEPARIN SODIUM 1000 [USP'U]/ML
INJECTION, SOLUTION INTRAVENOUS; SUBCUTANEOUS CODE/TRAUMA/SEDATION MEDICATION
Status: DISCONTINUED | OUTPATIENT
Start: 2025-07-23 | End: 2025-07-23 | Stop reason: HOSPADM

## 2025-07-23 RX ORDER — CARVEDILOL 3.12 MG/1
3.12 TABLET ORAL EVERY EVENING
Status: DISCONTINUED | OUTPATIENT
Start: 2025-07-23 | End: 2025-07-24 | Stop reason: HOSPADM

## 2025-07-23 RX ORDER — ROCURONIUM BROMIDE 10 MG/ML
INJECTION, SOLUTION INTRAVENOUS AS NEEDED
Status: DISCONTINUED | OUTPATIENT
Start: 2025-07-23 | End: 2025-07-23

## 2025-07-23 RX ORDER — FOLIC ACID 0.4 MG
400 TABLET ORAL DAILY
COMMUNITY

## 2025-07-23 RX ORDER — PHENYLEPHRINE HCL IN 0.9% NACL 1 MG/10 ML
SYRINGE (ML) INTRAVENOUS AS NEEDED
Status: DISCONTINUED | OUTPATIENT
Start: 2025-07-23 | End: 2025-07-23

## 2025-07-23 RX ORDER — SODIUM CHLORIDE 9 MG/ML
INJECTION, SOLUTION INTRAVENOUS CONTINUOUS PRN
Status: DISCONTINUED | OUTPATIENT
Start: 2025-07-23 | End: 2025-07-23

## 2025-07-23 RX ORDER — PROTAMINE SULFATE 10 MG/ML
INJECTION, SOLUTION INTRAVENOUS AS NEEDED
Status: DISCONTINUED | OUTPATIENT
Start: 2025-07-23 | End: 2025-07-23

## 2025-07-23 RX ORDER — ACETAMINOPHEN 325 MG/1
650 TABLET ORAL EVERY 4 HOURS PRN
Status: DISCONTINUED | OUTPATIENT
Start: 2025-07-23 | End: 2025-07-24 | Stop reason: HOSPADM

## 2025-07-23 RX ORDER — AMIODARONE HYDROCHLORIDE 200 MG/1
200 TABLET ORAL DAILY
Status: DISCONTINUED | OUTPATIENT
Start: 2025-07-24 | End: 2025-07-23

## 2025-07-23 RX ORDER — ASPIRIN 81 MG/1
81 TABLET ORAL DAILY
Status: DISCONTINUED | OUTPATIENT
Start: 2025-07-23 | End: 2025-07-24 | Stop reason: HOSPADM

## 2025-07-23 RX ORDER — GLYCOPYRROLATE 0.2 MG/ML
INJECTION INTRAMUSCULAR; INTRAVENOUS AS NEEDED
Status: DISCONTINUED | OUTPATIENT
Start: 2025-07-23 | End: 2025-07-23

## 2025-07-23 RX ORDER — CARVEDILOL 12.5 MG/1
6.25 TABLET ORAL EVERY MORNING
Status: DISCONTINUED | OUTPATIENT
Start: 2025-07-24 | End: 2025-07-23

## 2025-07-23 RX ORDER — FENTANYL CITRATE 50 UG/ML
INJECTION, SOLUTION INTRAMUSCULAR; INTRAVENOUS AS NEEDED
Status: DISCONTINUED | OUTPATIENT
Start: 2025-07-23 | End: 2025-07-23

## 2025-07-23 RX ORDER — LIDOCAINE HYDROCHLORIDE 10 MG/ML
INJECTION, SOLUTION EPIDURAL; INFILTRATION; INTRACAUDAL; PERINEURAL AS NEEDED
Status: DISCONTINUED | OUTPATIENT
Start: 2025-07-23 | End: 2025-07-23

## 2025-07-23 RX ORDER — HYDROMORPHONE HCL IN WATER/PF 6 MG/30 ML
0.2 PATIENT CONTROLLED ANALGESIA SYRINGE INTRAVENOUS
Status: DISCONTINUED | OUTPATIENT
Start: 2025-07-23 | End: 2025-07-23 | Stop reason: HOSPADM

## 2025-07-23 RX ORDER — PHENOL 1.4 %
600 AEROSOL, SPRAY (ML) MUCOUS MEMBRANE DAILY
COMMUNITY

## 2025-07-23 RX ORDER — PROPOFOL 10 MG/ML
INJECTION, EMULSION INTRAVENOUS AS NEEDED
Status: DISCONTINUED | OUTPATIENT
Start: 2025-07-23 | End: 2025-07-23

## 2025-07-23 RX ORDER — EZETIMIBE 10 MG/1
10 TABLET ORAL DAILY
COMMUNITY

## 2025-07-23 RX ORDER — CEFAZOLIN SODIUM 2 G/50ML
2000 SOLUTION INTRAVENOUS ONCE
Status: COMPLETED | OUTPATIENT
Start: 2025-07-23 | End: 2025-07-23

## 2025-07-23 RX ORDER — SODIUM CHLORIDE, SODIUM LACTATE, POTASSIUM CHLORIDE, CALCIUM CHLORIDE 600; 310; 30; 20 MG/100ML; MG/100ML; MG/100ML; MG/100ML
INJECTION, SOLUTION INTRAVENOUS CONTINUOUS PRN
Status: DISCONTINUED | OUTPATIENT
Start: 2025-07-23 | End: 2025-07-23

## 2025-07-23 RX ORDER — HYDRALAZINE HYDROCHLORIDE 20 MG/ML
5 INJECTION INTRAMUSCULAR; INTRAVENOUS EVERY 6 HOURS PRN
Status: DISCONTINUED | OUTPATIENT
Start: 2025-07-23 | End: 2025-07-24 | Stop reason: HOSPADM

## 2025-07-23 RX ORDER — DEXAMETHASONE SODIUM PHOSPHATE 10 MG/ML
INJECTION, SOLUTION INTRAMUSCULAR; INTRAVENOUS AS NEEDED
Status: DISCONTINUED | OUTPATIENT
Start: 2025-07-23 | End: 2025-07-23

## 2025-07-23 RX ORDER — SPIRONOLACTONE 25 MG
20 TABLET ORAL DAILY
COMMUNITY

## 2025-07-23 RX ORDER — ONDANSETRON 2 MG/ML
INJECTION INTRAMUSCULAR; INTRAVENOUS AS NEEDED
Status: DISCONTINUED | OUTPATIENT
Start: 2025-07-23 | End: 2025-07-23

## 2025-07-23 RX ORDER — CARVEDILOL 12.5 MG/1
6.25 TABLET ORAL EVERY MORNING
Status: DISCONTINUED | OUTPATIENT
Start: 2025-07-24 | End: 2025-07-24 | Stop reason: HOSPADM

## 2025-07-23 RX ORDER — EZETIMIBE 10 MG/1
10 TABLET ORAL DAILY
Status: DISCONTINUED | OUTPATIENT
Start: 2025-07-24 | End: 2025-07-24 | Stop reason: HOSPADM

## 2025-07-23 RX ORDER — OXYBUTYNIN CHLORIDE 10 MG/1
10 TABLET, EXTENDED RELEASE ORAL DAILY
Status: DISCONTINUED | OUTPATIENT
Start: 2025-07-24 | End: 2025-07-24 | Stop reason: HOSPADM

## 2025-07-23 RX ORDER — FENTANYL CITRATE/PF 50 MCG/ML
25 SYRINGE (ML) INJECTION
Status: DISCONTINUED | OUTPATIENT
Start: 2025-07-23 | End: 2025-07-23 | Stop reason: HOSPADM

## 2025-07-23 RX ORDER — ONDANSETRON 2 MG/ML
4 INJECTION INTRAMUSCULAR; INTRAVENOUS ONCE AS NEEDED
Status: DISCONTINUED | OUTPATIENT
Start: 2025-07-23 | End: 2025-07-23 | Stop reason: HOSPADM

## 2025-07-23 RX ORDER — THYROID 60 MG/1
60 TABLET ORAL DAILY
Status: DISCONTINUED | OUTPATIENT
Start: 2025-07-24 | End: 2025-07-24 | Stop reason: HOSPADM

## 2025-07-23 RX ORDER — HEPARIN SODIUM 10000 [USP'U]/100ML
INJECTION, SOLUTION INTRAVENOUS
Status: DISCONTINUED | OUTPATIENT
Start: 2025-07-23 | End: 2025-07-23 | Stop reason: HOSPADM

## 2025-07-23 RX ADMIN — GLYCOPYRROLATE 0.2 MG: 0.2 INJECTION INTRAMUSCULAR; INTRAVENOUS at 11:54

## 2025-07-23 RX ADMIN — DEXAMETHASONE SODIUM PHOSPHATE 10 MG: 10 INJECTION, SOLUTION INTRAMUSCULAR; INTRAVENOUS at 11:54

## 2025-07-23 RX ADMIN — SODIUM CHLORIDE: 0.9 INJECTION, SOLUTION INTRAVENOUS at 13:05

## 2025-07-23 RX ADMIN — Medication 100 MCG: at 13:37

## 2025-07-23 RX ADMIN — CARVEDILOL 3.12 MG: 3.12 TABLET, FILM COATED ORAL at 17:06

## 2025-07-23 RX ADMIN — ONDANSETRON 4 MG: 2 INJECTION INTRAMUSCULAR; INTRAVENOUS at 11:54

## 2025-07-23 RX ADMIN — SODIUM CHLORIDE, SODIUM LACTATE, POTASSIUM CHLORIDE, CALCIUM CHLORIDE: 600; 310; 30; 20 INJECTION, SOLUTION INTRAVENOUS at 11:03

## 2025-07-23 RX ADMIN — FENTANYL CITRATE 50 MCG: 50 INJECTION, SOLUTION INTRAMUSCULAR; INTRAVENOUS at 13:20

## 2025-07-23 RX ADMIN — PROPOFOL 120 MG: 10 INJECTION, EMULSION INTRAVENOUS at 11:48

## 2025-07-23 RX ADMIN — PROTAMINE SULFATE 30 MG: 10 INJECTION, SOLUTION INTRAVENOUS at 14:04

## 2025-07-23 RX ADMIN — HYDRALAZINE HYDROCHLORIDE 5 MG: 20 INJECTION, SOLUTION INTRAMUSCULAR; INTRAVENOUS at 18:36

## 2025-07-23 RX ADMIN — CEFAZOLIN SODIUM 2000 MG: 2 SOLUTION INTRAVENOUS at 11:44

## 2025-07-23 RX ADMIN — ROCURONIUM BROMIDE 10 MG: 10 INJECTION, SOLUTION INTRAVENOUS at 13:09

## 2025-07-23 RX ADMIN — ROCURONIUM BROMIDE 50 MG: 10 INJECTION, SOLUTION INTRAVENOUS at 11:48

## 2025-07-23 RX ADMIN — FENTANYL CITRATE 50 MCG: 50 INJECTION, SOLUTION INTRAMUSCULAR; INTRAVENOUS at 11:48

## 2025-07-23 RX ADMIN — LIDOCAINE HYDROCHLORIDE 50 MG: 10 INJECTION, SOLUTION EPIDURAL; INFILTRATION; INTRACAUDAL; PERINEURAL at 11:48

## 2025-07-23 RX ADMIN — SODIUM CHLORIDE 20 ML/HR: 0.9 INJECTION, SOLUTION INTRAVENOUS at 08:33

## 2025-07-23 NOTE — ASSESSMENT & PLAN NOTE
Lab Results   Component Value Date    EGFR 45 07/23/2025    EGFR 38 07/09/2025    EGFR 44 02/20/2025    CREATININE 1.15 07/23/2025    CREATININE 1.31 (H) 07/09/2025    CREATININE 1.17 02/20/2025

## 2025-07-23 NOTE — DISCHARGE INSTR - AVS FIRST PAGE
Medication Plan:    Take:   Xarelto 20mg daily. Do not stop blood thinner until discussion with provider at post op appointment.   Aspirin 81mg once daily for 30 days in the post ablation setting  Discontinue:  amiodarone    Post Procedure Care instructions:    For 7 days:  You may shower after the first 24 hours   Allow gentle soap and water to wash over incision.  Do not scrub. Pat to dry   If chaffing with undergarments, wear bandage during the day to prevent irritation. Maximum 5 days and change bandage daily.  Do not use lotions/powders/creams    You may walk or go up/down stairs      Restrictions for 1 week:   Do not lift greater than 10lbs    Avoid running/jumping    No submerging wound in water (No bath/hot tubs/pools/etc)    Normal healing process   You may have bruising that extends into your genitalia and/or down your thigh toward the knee. This can take several weeks to resolve.  Pain may worsen over the first 48 hours as you increase your activity and then will improve   Tylenol (acetaminophen)  Ice   You may have a small pea sized lump   Your arrhythmia may reoccur in the next 3 months  This is due to inflammation/irritation from the procedure    When to call clinic:    Redness or swelling at incision site   Bleeding or expanding lump in groin    Opening and/or drainage from the incision   Temperature >100.4 F   If your arrhythmia reoccurs call if:  You are symptomatic (dizziness/lightheadedness/heart racing/etc)    If you have any questions:   [phone] 8:30am-5:30pm  [phone] After hours  [phone] Appointments      Watchman Appendage Closure Device Instructions:  MEDICATION INSTRUCTIONS/CHANGES:  Blood thinner - continue xarelto 15mg and start aspirin 81mg daily    After Watchman has been placed, you will continue on both aspirin and xarelto until GWYN has been done at which point, if Watchman is healing well, will either be continued for on this regimen OR transition to aspirin and Plavix  for 6 months (see below under follow up). You will be advised by your physician on this.    RESTRICTIONS:   No heavy lifting (more than 5-10 pounds) or strenuous activity for one week.    No soaking in a bath tub/hot tub/swimming pool for one week or until groin heals. You may shower - please let soap and water run over the groins, no scrubbing, and pat the area dry. Please place band-aid on groins daily for up to five days, but you may remove sooner if no issues are noted.     If you notice ongoing bleeding, swelling, or firm lumps in groin near ablation incision, please contact Dr. Burnett office - [phone]. If you have any significant issues after hours or on the weekends, please call the on call cardiology number at [phone].    FOLLOW UP:  45 day GWYN has been scheduled for: 09/04/25 at 0900AM.  Please call Dr. Mcleod's office at [phone] after your GWYN has been completed to discuss anticoagulation/aspirin recommendations.  Please do not stop any blood thinner until you speak with Dr. Burnett or his designee.

## 2025-07-23 NOTE — ASSESSMENT & PLAN NOTE
-4/10/2023- MDT DC PPM implanted in [place] for tachy/amaris years ago    -last device check-5/5/2025  -4/2024- afib noted and flecainide 50mg bid started  -6/2024- flecainide increased to 100mg and diltiazem stopped 2/2 dizziness and fall  -11/2024- flecainide increased to 150mg bid  -4/2025 seen by Dr. Burnett for increasing afib burden   -stopped flecainide and started amio load since flecainide not working   -increased coreg to 6.25mg am and 3.125mg pm   -maintained on xarelto  -plan for afib ablation and watchman today

## 2025-07-23 NOTE — ANESTHESIA POSTPROCEDURE EVALUATION
Post-Op Assessment Note    CV Status:  Stable  Pain Score: 0    Pain management: adequate       Mental Status:  Sleepy and arousable   Hydration Status:  Euvolemic   PONV Controlled:  Controlled   Airway Patency:  Patent     Post Op Vitals Reviewed: Yes    No anethesia notable event occurred.    Staff: CRNA, Anesthesiologist           Last Filed PACU Vitals:  Vitals Value Taken Time   Temp 97    Pulse 60    /86    Resp 16    SpO2 99

## 2025-07-23 NOTE — H&P
H&P Exam - Electrophysiology - Cardiology   Natali Hernandez 79 y.o. 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 Burnett for increasing afib burden   -stopped flecainide and started amio load since flecainide not working   -increased coreg to 6.25mg am and 3.125mg pm   -maintained on xarelto  -plan for afib ablation and watchman today  Primary hypertension  -monitor  -continue coreg  Pure hypercholesterolemia  -maintained on zetia  -monitored by primary cardiology  Fall  -frequent falls with bleeding risk on xarelto with multiple fxs and osteoporosis  Stage 3 chronic kidney disease, unspecified whether stage 3a or 3b CKD ([facility])  Lab Results   Component Value Date    EGFR 45 07/23/2025    EGFR 38 07/09/2025    EGFR 44 02/20/2025    CREATININE 1.15 07/23/2025    CREATININE 1.31 (H) 07/09/2025    CREATININE 1.17 02/20/2025       History of Present Illness   HPI:  Natali Hernandez is a 79 y.o. year old female with PMH of HTN, HLD, tacky bradycardia status post pacemaker placement in [place] in 2023.  Patient fell and broke her hip a few weeks later.  Patient has had frequent falls and has osteoporosis.  Patient is being followed by Dr. Major outpatient and was placed on flecainide for A-fib.  Patient continued to have increasing A-fib burden.  She was in initially uptitrated on flecainide and Cardizem was DC'd.  Patient had follow-up with Dr. Burnett who stopped the flecainide and increase the Coreg and is now here for A-fib ablation and Watchman procedure.    Patient will have standard bedrest postprocedure and  "and stay overnight for observation.    EKG: apaced    5/5/2025 DEvice check  MDT DC PM/ACTIVE SYSTEM IS MRI CONDITIONAL   CARELINK TRANSMISSION: BATTERY VOLTAGE ADEQUATE. (11.7 YRS) AP 88%  <1%. ALL AVAILABLE LEAD PARAMETERS WITHIN NORMAL LIMITS. 1 VHR EPISODE DETECTED, RVR NOTED. 17 FAST A & V EPISODES DETECTED. 15 AT/AF EPISODES DETECTED ( 7.2% OF TIME) LONGEST 15 HOURS. PATIENT IS ON XARELTO, AMIO AND CARVEDILOL. SINGLE PVC COUNT 7.1/HOUR. NORMAL DEVICE FUNCTION.---LEES     Review of Systems  ROS as noted above, otherwise 12 point review of systems was performed and is negative.     Historical Information   Past Medical History[1]  Past Surgical History[2]  Family History: Family History[3]    Social History   Social History     Substance and Sexual Activity   Alcohol Use Not Currently     Social History     Substance and Sexual Activity   Drug Use Never     Tobacco Use History[4]    Meds/Allergies   all medications and allergies reviewed  Home Medications:   Medications Prior to Admission:     acetaminophen (TYLENOL) 325 mg tablet    amiodarone 200 mg tablet    calcium carbonate (OS-JH) 600 MG tablet    carvedilol (COREG) 3.125 mg tablet    Cholecalciferol (VITAMIN D3) 1,000 units tablet    ezetimibe (ZETIA) 10 mg tablet    fexofenadine (ALLEGRA) 180 MG tablet    folic acid (FOLVITE) 400 mcg tablet    Lutein 20 MG CAPS    rivaroxaban (Xarelto) 20 mg tablet    solifenacin (VESICARE) 10 MG tablet    thyroid ([place] Thyroid) 60 MG tablet    denosumab (Prolia) 60 mg/mL    Allergies[5]    Objective   Vitals: Blood pressure (!) 208/97, pulse 60, temperature (!) 97.1 °F (36.2 °C), temperature source Temporal, resp. rate 17, height 5' 4\" (1.626 m), weight 56.7 kg (125 lb), SpO2 94%.  Orthostatic Blood Pressures      Flowsheet Row Most Recent Value   Blood Pressure 208/97  [right arm, automatic RN aware] filed at 07/23/2025 0902            No intake or output data in the 24 hours ending 07/23/25 0919    Invasive " Devices       Peripheral Intravenous Line  Duration             Peripheral IV 07/23/25 Left;Dorsal (posterior) Wrist <1 day    Peripheral IV 07/23/25 Proximal;Right;Ventral (anterior) Forearm <1 day                    Physical Exam vss  GEN: NAD, alert and oriented, well appearing  SKIN: dry without significant lesions or rashes  HEENT: NCAT, PERRL, EOMs intact  NECK: No JVD or carotid bruits appreciated  CARDIOVASCULAR: RRR, normal S1, S2 without murmurs, rubs, or gallops appreciated  LUNGS: Clear to auscultation bilaterally without wheezes, rhonchi, or rales  ABDOMEN: Soft, nontender, nondistended  EXTREMITIES/VASCULAR: perfused without clubbing, cyanosis, or edema b/l  PSYCH: Normal mood and affect  NEURO: CN ll-Xll grossly intact    Lab Results: I have personally reviewed pertinent lab results.    Results from last 7 days   Lab Units 07/23/25  0817   WBC Thousand/uL 3.17*   HEMOGLOBIN g/dL 11.0*   HEMATOCRIT % 35.1   PLATELETS Thousands/uL 141*     Results from last 7 days   Lab Units 07/23/25  0817   POTASSIUM mmol/L 4.7   CHLORIDE mmol/L 107   CO2 mmol/L 26   BUN mg/dL 34*   CREATININE mg/dL 1.15   CALCIUM mg/dL 10.2     Results from last 7 days   Lab Units 07/23/25  0817   INR  2.27*             Imaging: Results Review Statement: I personally reviewed the following image studies/reports in PACS and discussed pertinent findings with Radiology: Echocardiogram. My interpretation of the radiology images/reports is:  .  No results found for this or any previous visit.    ECHO: 3/21/2024  Interpretation Summary  Show Result Comparison     Left Ventricle: Left ventricular cavity size is normal. Wall thickness is normal. The left ventricular ejection fraction is 60%. Systolic function is normal. Wall motion is normal.    Left Atrium: The atrium is mildly dilated.    Aortic Valve: There is mild regurgitation. There is mild stenosis. Peak gradient 20 mmHg, mean gradeint 10 mmHg.    Mitral Valve: There is mild to  "moderate regurgitation.    Tricuspid Valve: There is moderate regurgitation.    Pulmonary Artery: The estimated pulmonary artery systolic pressure is 37.0 mmHg.    Code Status: Level 1 - Full Code    ** Please Note: Dictation voice to text software may have been used in the creation of this document. **        [1]   Past Medical History:  Diagnosis Date    A-fib (HCC)     Allergic     Arthritis     Coronary artery disease     Disease of thyroid gland     Diverticulitis of colon     History of loop recorder     Hypertension     Irregular heart beat     Multiple falls     Pacemaker     Stroke (HCC)     Substance abuse (HCC)     Thyroid disease    [2]   Past Surgical History:  Procedure Laterality Date    APPENDECTOMY      BILATERAL HIP ARTHROSCOPY Right     CARDIAC LOOP RECORDER      CARDIAC PACEMAKER PLACEMENT      EYE SURGERY      FRACTURE SURGERY      HYSTERECTOMY      JOINT REPLACEMENT     [3] No family history on file.  [4]   Social History  Tobacco Use   Smoking Status Former    Current packs/day: 1.00    Average packs/day: 1 pack/day for 15.0 years (15.0 ttl pk-yrs)    Types: Cigarettes    Passive exposure: Never   Smokeless Tobacco Never   [5]   Allergies  Allergen Reactions    Crestor [Rosuvastatin] Myalgia    Other Other (See Comments)     Any \"steroids\" including prednisone and eye steroids; causes muscle weakness, joint pain      "

## 2025-07-23 NOTE — DISCHARGE SUMMARY
Discharge Summary - Electrophysiology   Name: Natali Hernandez 79 y.o. female I MRN: [id number]  Unit/Bed#: [facility] 411-01 I Date of Admission: 7/23/2025   Date of Service: 7/23/2025 I Hospital Day: 0      Discharge Summary - Natali Hernandez 79 y.o. female MRN: [id number]    Unit/Bed#: [facility] 411-01 Encounter: [id number]      Admission Date: 7/23/2025   Discharge Date: 7/24/25    Discharge Diagnosis: A-fib    Procedures Performed: 7/23/2025-A-fib/flutter ablation-PVI, flutter line and Watchman device  Orders Placed This Encounter   Procedures    Cardiac ep lab eps/ablations         Consultants: None      HPI: Please refer to the initial history and physical as well as procedure notes for full details. Briefly, Natali Hernandez is a 79 y.o. year old female with PMH of HTN, HLD, tachybradycardia status post pacemaker in [place] 2023, fall history, osteoporosis, A-fib.   She was seen by Dr. Burnett and Dr. Major as an outpatient, and A-fib ablation and Watchman was recommended. She presented this hospital admission to undergo this procedure.     Hospital Course: Natali Hernandez presented at her baseline state of health. After the procedure was explained in detail and consent was obtained, she underwent the above procedures without complications. Please see operative notes by Dr. Burnett for full details. She tolerated the procedure well. She was then monitored overnight for further observation.    There were no acute issues or events overnight. The following morning she denied all cardiac complaints, including chest pain/pressure, dyspnea, palpitations, dizziness, lightheadedness, or syncope. Her vital signs were reviewed and labs are stable. Telemetry showed no significant acute events overnight. Her groins were soft without significant hematoma or recurrent bleeding.     Pt will get a follow up GWYN in 45days (currently scheduled 09/04/25)     CXR this am: showed watchman in situ without pneumothorax per official read    Physical  exam:  VSS  GEN: NAD, alert and oriented x 3, well appearing  SKIN: dry without significant lesions or rashes  HEENT: NCAT, PERRL, EOMs intact  NECK: No JVD appreciated  CARDIOVASCULAR: RRR, normal S1, S2 without murmurs, rubs, or gallops appreciated  LUNGS: Clear to auscultation bilaterally without wheezes, rhonchi, or rales  ABDOMEN: Soft, nontender, nondistended  EXTREMITIES/VASCULAR: perfused without clubbing, cyanosis, or LE edema b/l  PSYCH: Normal mood and affect  NEURO: CN ll-Xll grossly intact    She was given routine post ablation discharge instructions and restrictions, and these were explained in detail. She was given a follow up appointment with Tim Hayden PA-C, and she was instructed to follow up with her primary cardiologist as previously instructed.    In terms of her medications, she will stop amiodarone.  She will continue Xarelto 20 mg and aspirin 81 mg until future discussion with Dr. Burnett or his colleagues.    She is stable for discharge at this time with all questions answered. She was discussed in detail with Dr. Dr. Burnett who is in agreement with this discharge summary.       Discharge Medications:  See after visit summary for reconciled discharge medications provided to patient and family.      Medications Prior to Admission:     acetaminophen (TYLENOL) 325 mg tablet    amiodarone 200 mg tablet    calcium carbonate (OS-JH) 600 MG tablet    carvedilol (COREG) 3.125 mg tablet    Cholecalciferol (VITAMIN D3) 1,000 units tablet    ezetimibe (ZETIA) 10 mg tablet    fexofenadine (ALLEGRA) 180 MG tablet    folic acid (FOLVITE) 400 mcg tablet    Lutein 20 MG CAPS    rivaroxaban (Xarelto) 20 mg tablet    solifenacin (VESICARE) 10 MG tablet    thyroid ([place] Thyroid) 60 MG tablet    denosumab (Prolia) 60 mg/mL      [facility]inet Labs/diagnostics:  CBC with diff:   Results from last 7 days   Lab Units 07/23/25  0817   WBC Thousand/uL 3.17*   HEMOGLOBIN g/dL 11.0*   HEMATOCRIT % 35.1   MCV fL 96    PLATELETS Thousands/uL 141*   RBC Million/uL 3.66*   MCH pg 30.1   MCHC g/dL 31.3*   RDW % 15.0   MPV fL 10.5       BMP:  Results from last 7 days   Lab Units 07/23/25  0817   POTASSIUM mmol/L 4.7   CHLORIDE mmol/L 107   CO2 mmol/L 26   BUN mg/dL 34*   CREATININE mg/dL 1.15   CALCIUM mg/dL 10.2       Magnesium:       Coags:   Results from last 7 days   Lab Units 07/23/25  0817   INR  2.27*         Complications: none    Condition at Discharge: good     Discharge instructions/Information to patient and family:   See after visit summary for information provided to patient and family.      Provisions for Follow-Up Care:  See after visit summary for information related to follow-up care and any pertinent home health orders.      Disposition: Home    Planned Readmission: No    Discharge Statement   I spent 45 minutes minutes discharging the patient. This time was spent on the day of discharge. I had direct contact with the patient on the day of discharge. Additional documentation is required if more than 30 minutes were spent on discharge. Evaluating the incision, discussing discharge instructions and restrictions, arranging follow up appointments, discussing medications

## 2025-07-23 NOTE — ANESTHESIA PROCEDURE NOTES
Procedure Performed: GWYN Anesthesia  Start Time:  7/23/2025 11:30 PM        Preanesthesia Checklist    Patient identified, IV assessed, risks and benefits discussed, monitors and equipment assessed, procedure being performed at surgeon's request and anesthesia consent obtained.      Procedure    Diagnostic Indications for GWYN:  assessment of ascending aorta. Type of GWYN: interventional GWYN with real time guidance of intracardiac procedure. Images Saved: ultrasound permanent image saved. Physician Requesting Echo: Harsha Burnett MD.  Location performed: OR. Intubated.   Insertion of GWYN Probe:  Atraumatic. Probe Type:  Multiplane. Modalities:  Color flow mapping, 2D only, continuous wave Doppler and pulse wave Doppler.      Echocardiographic and Doppler Measurements    PREPROCEDURE    LEFT VENTRICLE:  Systolic Function: normal. Ejection Fraction: 60%. Cavity size: normal.       RIGHT VENTRICLE:  Systolic Function: normal.  Cavity size normal.              AORTIC VALVE:  Leaflets: trileaflet. Leaflet motions normal and normal. Stenosis: none.     Regurgitation: moderate. Pressure Half-time: 450 ms.     MITRAL VALVE:  Leaflets: normal. Leaflet Motions: normal. Regurgitation: trace.   Stenosis: none.       TRICUSPID VALVE:  Leaflets: normal. Leaflet Motions: normal.  Regurgitation: trace.      PULMONIC VALVE:  Leaflets: normal.          ASCENDING AORTA:  Size:  normal.  Dissection not present.      AORTIC ARCH:  Size:  normal.  dissection not present. Grade 2: severe intimal thickening without protruding atheroma.    DESCENDING AORTA:  Size: normal.  Dissection not present. Grade 2: severe intimal thickening without protruding atheroma.        RIGHT ATRIUM:  Size:  normal.     LEFT ATRIUM:  Size: normal. No spontaneous echo contrast.    LEFT ATRIAL APPENDAGE:  Size: normal. No spontaneous echo contrast         ATRIAL SEPTUM:  Intra-atrial septal morphology: normal.          VENTRICULAR SEPTUM:  Intra-ventricular septum  morphology: normal.             OTHER FINDINGS:   Pleural Effusion:  none.        POSTPROCEDURE                    REMOVAL:  Probe Removal: atraumatic.      ECHOCARDIOGRAM COMMENTS:  Watchman with appropriate compression and no leak. Residual transeptal puncture site with L-> R atrial flow. .

## 2025-07-23 NOTE — ANESTHESIA PREPROCEDURE EVALUATION
Procedure:  Cardiac eps/afib ablation PFA (Chest)  Cardiac Atrial Appendage Closure (EP) (Chest)    Relevant Problems   CARDIO   (+) Paroxysmal atrial fibrillation (HCC)   (+) Primary hypertension   (+) Pure hypercholesterolemia      /RENAL   (+) Stage 3 chronic kidney disease, unspecified whether stage 3a or 3b CKD (HCC)        Physical Exam    Airway     Mallampati score: II  TM Distance: >3 FB  Neck ROM: full      Cardiovascular      Dental       Pulmonary      Neurological      Other Findings  post-pubertal.      Anesthesia Plan  ASA Score- 3     Anesthesia Type- general with ASA Monitors.         Additional Monitors:     Airway Plan: Oral ETT.           Plan Factors-Exercise tolerance (METS): >4 METS.    Chart reviewed.                      Induction- intravenous.    Postoperative Plan- .   Monitoring Plan - Monitoring plan - standard ASA monitoring and GWYN  Post Operative Pain Plan - multimodal analgesia    Perioperative Resuscitation Plan - Level 1 - Full Code.       Informed Consent- Anesthetic plan and risks discussed with patient.  I personally reviewed this patient with the CRNA. Discussed and agreed on the Anesthesia Plan with the CRNA..      NPO Status:  Vitals Value Taken Time   Date of last liquid 07/23/25 07/23/25 09:41   Time of last liquid 0600 07/23/25 09:41   Date of last solid 07/22/25 07/23/25 09:41   Time of last solid 1900 07/23/25 09:41          Niacinamide Counseling: I recommended taking niacin or niacinamide, also know as vitamin B3, twice daily. Recent evidence suggests that taking vitamin B3 (500 mg twice daily) can reduce the risk of actinic keratoses and non-melanoma skin cancers. Side effects of vitamin B3 include flushing and headache.

## 2025-07-24 ENCOUNTER — APPOINTMENT (INPATIENT)
Dept: RADIOLOGY | Facility: HOSPITAL | Age: 80
DRG: 317 | End: 2025-07-24
Payer: COMMERCIAL

## 2025-07-24 ENCOUNTER — TRANSITIONAL CARE MANAGEMENT (OUTPATIENT)
Dept: FAMILY MEDICINE CLINIC | Facility: CLINIC | Age: 80
End: 2025-07-24

## 2025-07-24 VITALS
DIASTOLIC BLOOD PRESSURE: 54 MMHG | HEART RATE: 62 BPM | SYSTOLIC BLOOD PRESSURE: 132 MMHG | OXYGEN SATURATION: 100 % | BODY MASS INDEX: 21.34 KG/M2 | HEIGHT: 64 IN | WEIGHT: 125 LBS | TEMPERATURE: 97.8 F | RESPIRATION RATE: 16 BRPM

## 2025-07-24 LAB
ANION GAP SERPL CALCULATED.3IONS-SCNC: 9 MMOL/L (ref 4–13)
ATRIAL RATE: 60 BPM
BUN SERPL-MCNC: 28 MG/DL (ref 5–25)
CALCIUM SERPL-MCNC: 8.6 MG/DL (ref 8.4–10.2)
CHLORIDE SERPL-SCNC: 109 MMOL/L (ref 96–108)
CO2 SERPL-SCNC: 21 MMOL/L (ref 21–32)
CREAT SERPL-MCNC: 0.88 MG/DL (ref 0.6–1.3)
ERYTHROCYTE [DISTWIDTH] IN BLOOD BY AUTOMATED COUNT: 15.7 % (ref 11.6–15.1)
GFR SERPL CREATININE-BSD FRML MDRD: 62 ML/MIN/1.73SQ M
GLUCOSE SERPL-MCNC: 93 MG/DL (ref 65–140)
HCT VFR BLD AUTO: 30.5 % (ref 34.8–46.1)
HGB BLD-MCNC: 9.5 G/DL (ref 11.5–15.4)
INR PPP: 1.31 (ref 0.85–1.19)
MAGNESIUM SERPL-MCNC: 2.4 MG/DL (ref 1.9–2.7)
MCH RBC QN AUTO: 30.3 PG (ref 26.8–34.3)
MCHC RBC AUTO-ENTMCNC: 31.1 G/DL (ref 31.4–37.4)
MCV RBC AUTO: 97 FL (ref 82–98)
PLATELET # BLD AUTO: 130 THOUSANDS/UL (ref 149–390)
PMV BLD AUTO: 11.1 FL (ref 8.9–12.7)
POTASSIUM SERPL-SCNC: 4.2 MMOL/L (ref 3.5–5.3)
PR INTERVAL: 208 MS
PROTHROMBIN TIME: 16.5 SECONDS (ref 12.3–15)
QRS AXIS: 21 DEGREES
QRSD INTERVAL: 76 MS
QT INTERVAL: 518 MS
QTC INTERVAL: 518 MS
RBC # BLD AUTO: 3.14 MILLION/UL (ref 3.81–5.12)
SODIUM SERPL-SCNC: 139 MMOL/L (ref 135–147)
T WAVE AXIS: 57 DEGREES
VENTRICULAR RATE: 60 BPM
WBC # BLD AUTO: 5.5 THOUSAND/UL (ref 4.31–10.16)

## 2025-07-24 PROCEDURE — 85027 COMPLETE CBC AUTOMATED: CPT | Performed by: PHYSICIAN ASSISTANT

## 2025-07-24 PROCEDURE — 80048 BASIC METABOLIC PNL TOTAL CA: CPT | Performed by: PHYSICIAN ASSISTANT

## 2025-07-24 PROCEDURE — 85610 PROTHROMBIN TIME: CPT | Performed by: PHYSICIAN ASSISTANT

## 2025-07-24 PROCEDURE — NC001 PR NO CHARGE

## 2025-07-24 PROCEDURE — 93010 ELECTROCARDIOGRAM REPORT: CPT | Performed by: INTERNAL MEDICINE

## 2025-07-24 PROCEDURE — 71046 X-RAY EXAM CHEST 2 VIEWS: CPT

## 2025-07-24 PROCEDURE — 83735 ASSAY OF MAGNESIUM: CPT | Performed by: PHYSICIAN ASSISTANT

## 2025-07-24 RX ORDER — ASPIRIN 81 MG/1
81 TABLET ORAL DAILY
Qty: 90 TABLET | Refills: 1 | Status: SHIPPED | OUTPATIENT
Start: 2025-07-25

## 2025-07-24 RX ADMIN — HYDRALAZINE HYDROCHLORIDE 5 MG: 20 INJECTION, SOLUTION INTRAMUSCULAR; INTRAVENOUS at 02:12

## 2025-07-24 RX ADMIN — CARVEDILOL 6.25 MG: 12.5 TABLET, FILM COATED ORAL at 09:35

## 2025-07-24 RX ADMIN — EZETIMIBE 10 MG: 10 TABLET ORAL at 09:34

## 2025-07-24 RX ADMIN — ASPIRIN 81 MG: 81 TABLET ORAL at 09:35

## 2025-07-24 RX ADMIN — OXYBUTYNIN 10 MG: 10 TABLET, FILM COATED, EXTENDED RELEASE ORAL at 09:35

## 2025-07-24 NOTE — PLAN OF CARE
Problem: Potential for Falls  Goal: Patient will remain free of falls  Description: INTERVENTIONS:  - Educate patient/family on patient safety including physical limitations  - Instruct patient to call for assistance with activity   - Consider consulting OT/PT to assist with strengthening/mobility based on AM PAC & JH-HLM score  - Consult OT/PT to assist with strengthening/mobility   - Keep Call bell within reach  - Keep bed low and locked with side rails adjusted as appropriate  - Keep care items and personal belongings within reach  - Initiate and maintain comfort rounds  - Make Fall Risk Sign visible to staff  - Offer Toileting every 2 Hours, in advance of need  - Initiate/Maintain bed alarm  - Obtain necessary fall risk management equipment  - Apply yellow socks and bracelet for high fall risk patients  - Consider moving patient to room near nurses station  Outcome: Progressing     Problem: PAIN - ADULT  Goal: Verbalizes/displays adequate comfort level or baseline comfort level  Description: Interventions:  - Encourage patient to monitor pain and request assistance  - Assess pain using appropriate pain scale  - Administer analgesics as ordered based on type and severity of pain and evaluate response  - Implement non-pharmacological measures as appropriate and evaluate response  - Consider cultural and social influences on pain and pain management  - Notify physician/advanced practitioner if interventions unsuccessful or patient reports new pain  - Educate patient/family on pain management process including their role and importance of  reporting pain   - Provide non-pharmacologic/complimentary pain relief interventions  Outcome: Progressing     Problem: INFECTION - ADULT  Goal: Absence or prevention of progression during hospitalization  Description: INTERVENTIONS:  - Assess and monitor for signs and symptoms of infection  - Monitor lab/diagnostic results  - Monitor all insertion sites, i.e. indwelling lines,  tubes, and drains  - Monitor endotracheal if appropriate and nasal secretions for changes in amount and color  - [place] appropriate cooling/warming therapies per order  - Administer medications as ordered  - Instruct and encourage patient and family to use good hand hygiene technique  - Identify and instruct in appropriate isolation precautions for identified infection/condition  Outcome: Progressing

## 2025-07-24 NOTE — UTILIZATION REVIEW
Initial Clinical Review    Elective   INPT  surgical procedure  Age/Sex: 79 y.o. female  Surgery Date: 7/23    PROCEDURES PERFORMED   1. Atrial fibrillation ablation using Pulsed Field ablation  2. Cavotricuspid Isthmus Right atrial flutter ablation  3. . 3-D map with NAVX   4. Intracardiac Echocardiography ICE.  5. Ultrasound guided venous access  6. WATCHMAN Left atrial appendage oclussion    Medtronic dual chamber pacemaker interogated before and after procedure.       ANESTHESIA General anesthesia.    POD#1 Progress Note: no acute issues or events overnight. The following morning she denied all cardiac complaints, including chest pain/pressure, dyspnea, palpitations, dizziness, lightheadedness, or syncope. Her vital signs were reviewed and labs are stable. Telemetry showed no significant acute events overnight. Her groins were soft without significant hematoma or recurrent bleeding.     Admission Orders: Date/Time/Statement:   Admission Orders (From admission, onward)       Ordered        07/23/25 0932  Inpatient Admission  Once                          Orders Placed This Encounter   Procedures    Inpatient Admission     Standing Status:   Standing     Number of Occurrences:   1     Level of Care:   Med Surg [16]     Estimated length of stay:   Inpatient Only Surgery     Diet: reg   Mobility: amb  DVT Prophylaxis: scd's b/l LE     Medications/Pain Control:   Scheduled Medications:  aspirin, 81 mg, Oral, Daily  carvedilol, 6.25 mg, Oral, QAM  carvedilol, 3.125 mg, Oral, QPM  ezetimibe, 10 mg, Oral, Daily  oxybutynin, 10 mg, Oral, Daily  rivaroxaban, 15 mg, Oral, Daily With Dinner  thyroid, 60 mg, Oral, Daily      Continuous IV Infusions:     PRN Meds:  acetaminophen, 650 mg, Oral, Q4H PRN  hydrALAZINE, 5 mg, Intravenous, Q6H PRN      Vital Signs (last 3 days)       Date/Time Temp Pulse Resp BP MAP (mmHg) SpO2 O2 Device Pain    07/24/25 07:52:13 97.8 °F (36.6 °C) 62 16 132/54 80 100 % -- --    07/24/25 05:48:42 --  60 -- 127/55 79 99 % -- --    25 02:03:45 -- 71 -- 173/77 109 99 % -- --    25 02:00:46 97.5 °F (36.4 °C) 74 20 173/77 109 99 % -- --    25 1500 -- 60 24 186/96 133 99 % None (Room air) No Pain    25 1445 -- 60 16 187/86 126 98 % None (Room air) No Pain    25 1430 97.5 °F (36.4 °C) 60 18 176/83 117 100 % None (Room air) No Pain    25 0902 97.1 °F (36.2 °C) 60 17 208/97 -- 94 % None (Room air) --    25 0900 -- -- -- 221/97 -- -- -- --          Weight (last 2 days)       Date/Time Weight    25 0902 56.7 (125)            Pertinent Labs/Diagnostic Test Results:   Radiology:  XR chest pa & lateral   Final Interpretation by Michael Alicia MD ([id number])      Status post placement of left atrial appendage closure device. No pneumothorax.            Resident: MAGUI RAMIRES I, the attending radiologist, have reviewed the images and agree with the final report above.      Workstation performed: [id number]           Cardiology:  ECG 12 lead    by Interface, Ris Results In ( 1618)      ECG 12 lead    by Interface, Ris Results In ( 1435)      Cardiac ep lab eps/ablations   Final Result by Harsha Burnett MD ( 151)   Images from the original result were not included.   [facility]   [address] [address]       INVASIVE CARDIOLOGY- ATRIAL FIBRILLATION ABLATION with  WATCHMAN Left    atrial appendage occlussion      PT NAME: Natali Hernandez   : 1945      PERFORMING/ATTENDING PHY: HARSHA BURNETT       REFERRING Provider: Dr Major    DATE OF PROCEDURE: 25      PREOPERATIVE DIAGNOSIS:   Atrial fibrillation and flutter, paroxysmal   High bleeding risk due to falls   Dual chamber pacemaker            PROCEDURES PERFORMED    1. Atrial fibrillation ablation using Pulsed Field ablation   2. Cavotricuspid Isthmus Right atrial flutter ablation   3. . 3-D map with NAVX    4.  "Intracardiac Echocardiography ICE.   5. Ultrasound guided venous access   6. WATCHMAN Left atrial appendage oclussion         ANESTHESIA General anesthesia.    PREOPERATIVE MEDICATIONS: See list in Epic      INFORMED CONSENT: Risks, benefits, and alternatives to atrial fibrillation    ablation and associated procedures discussed. The patient understood risks    include but not limited to death, stroke, bleeding and vascular    complication, and bleeding around the heart and kidney injury due to    hemolsysis.         DESCRIPTION OF PROCEDURE The patient was draped in normal sterile fashion.    Using ultrasound guidance, an 11F placed in the right femoral vein. An 8    French sheath was placed in the left femoral vein x 2 A coronary sinus    catheter was placed under  3-D mapping. 3-D mapping and fluoroscopy to    create right atrial map, bundle of His recording and RV recording and LA    Recording.       Single transseptal puncture was performed using Intracardiac Echo. The    patient had been heparinized with a weight based heparin bolus before    transeptal and repeat bolus to to maintain -350s while in the left    atrium. VersaCross transseptal dilator was used with Faradrive sheath was    then brought into the left atrium, dilator removed.              PULMONARY VEIN ISOLATION   In Basket and Flower pose approximately 8-16 lesions (2 in each positions,    in 2 different rotations) and additional lesions given as needed to    achieve wide antral lesion set and also \"Olive\" lesions to treat Pulmonary    vein sleaves. Pulses at approximately 2 kV  were delivered to render the    atrial tissue in Pulmonary veins innert.       We confirmed Pulmonary vein isolation with post mapping with Farawave    nearfield signals.                RIGHT ATRIAL CAVOTRICUSPID ISTHMUS (CT I) ATRIAL FLUTTER ABLATION:    ATRIAL FLUTTER ABLATION: Ablation was performed with Tactiflex at 40atts    <40 degrees Celsius. We were pacing " the CS at 600ms and looking for block    during ablation. Ablation was performed at 60s lesion sets. Block was    achieved, confirmed to be bidirectional block 180ms septal to lateral,    same lateral to septal, with characteristic block pattern going around the    tricuspid annulus. We had done the initial flutter ablation before the    transseptal and then rechecked after pulmonary vein isolation over 30 minr    later and cavotricuspid isthmus block was reconnected, we touched up by    IVC and block again, waited another 20 minutes w/o reconnection.    Post ablation maps:       A complete EPS was performed. HV wnl  AV [place]  VA conduction was    midline and decremental . No dual Av barry physiology was observed .         WE exchanged over Versacross wire from [employer] to a 15F Deflectable    Watchman delivery sheath in Left atrium. We then used a 5mm pigtail    catheter and placed in anterior lobe of appendage and did venogram to    measure appendage depth. A 200mm Watchman FLX Pro device was used, the    pigtail was removed and the watch to tip of sheath, marker bands aligned    with fluoroscopy. We then unsheathed the Watchman. The device was expanded    2x the sheath width in ball formation and it was advanced and retracted as    needed until shoulders were plane of Os and then unsheathed completely.     After the final deployment we checked usual echo views at 0,45,90, 135    degrees w GWYN and 0mm  (no leak) seen, and there was not excess shoulder    of device seen in any view. The device was compressed approximately 15%    appropriately. A tug test showed normal recoil without dislodgement. After    this the device was release from delivery cable and sheaths removed.         After the case heparin was reversed with weight based protamine dose and    sheaths were removed with Perclose x2   and [place] and hemostasis    achieved.       Medtronic dual chamber pacemaker interogated before and after  procedure.            COMPLICATIONS: None   EBL: Minimal.    CONTRAST: NONE   TOTAL PFA APPLICATIONS: 46 Pulses at approximately 2.0kV            POSTOPERATIVE DIAGNOSIS:   Same as preop.      Successful ablation of atrial fibrillation with isolation using Pulsed    Field abaltion of all 4 pulmonary veins.  Successful Atrial Flutter    ablation with bidirectional block achieved.  Successful  Watchman left    atrial appendage occlusion device.            The patient tolerated the procedure well and had no neurological deficits    upon awaking.  Intracardiac ICE confirmed no effusion at the end of the    case.          PLAN Continue anticoagulation , bed rest, follow up routinely .             ECG 12 lead   Final Result by Wilner Galeano MD (07/23 0936)   Atrial-paced rhythm   Low voltage QRS   Nonspecific T wave abnormality   Abnormal ECG   When compared with ECG of 20-Feb-2025 13:04,   Electronic ventricular pacemaker is no longer seen   Confirmed by Wilner Galeano ([id number]) on 7/23/2025 9:36:31 AM        GI:  No orders to display           Results from last 7 days   Lab Units 07/24/25  0458 07/23/25  0817   WBC Thousand/uL 5.50 3.17*   HEMOGLOBIN g/dL 9.5* 11.0*   HEMATOCRIT % 30.5* 35.1   PLATELETS Thousands/uL 130* 141*         Results from last 7 days   Lab Units 07/24/25  0458 07/23/25  0817   SODIUM mmol/L 139 139   POTASSIUM mmol/L 4.2 4.7   CHLORIDE mmol/L 109* 107   CO2 mmol/L 21 26   ANION GAP mmol/L 9 6   BUN mg/dL 28* 34*   CREATININE mg/dL 0.88 1.15   EGFR ml/min/1.73sq m 62 45   CALCIUM mg/dL 8.6 10.2   MAGNESIUM mg/dL 2.4  --              Results from last 7 days   Lab Units 07/24/25  0458 07/23/25  0817   GLUCOSE RANDOM mg/dL 93 89     Results from last 7 days   Lab Units 07/24/25  0458 07/23/25  0817   PROTIME seconds 16.5* 25.0*   INR  1.31* 2.27*   Network Utilization Review Department  ATTENTION: Please call with any questions or concerns to [phone] and carefully listen to the prompts so  that you are directed to the right person. All voicemails are confidential.   For Discharge needs, contact Care Management DC Support Team at [phone] opt. 2  Send all requests for admission clinical reviews, approved or denied determinations and any other requests to dedicated fax number below belonging to the campus where the patient is receiving treatment. List of dedicated fax numbers for the Facilities:  FACILITY NAME UR FAX NUMBER   ADMISSION DENIALS (Administrative/Medical Necessity) [phone]   DISCHARGE SUPPORT TEAM (NETWORK) [phone]   PARENT CHILD HEALTH (Maternity/NICU/Pediatrics) [phone]   [facility] [phone]   [facility] [phone]   [facility] [phone]   [facility] [phone]   [facility] [phone]   Critical access hospital [phone]   [facility] [phone]   [facility] [phone]   [facility] [phone]   [facility] [phone]   [facility] [phone]   [facility] [phone]   [facility] [phone]   --

## 2025-07-24 NOTE — PLAN OF CARE
Problem: Potential for Falls  Goal: Patient will remain free of falls  Description: INTERVENTIONS:  - Educate patient/family on patient safety including physical limitations  - Instruct patient to call for assistance with activity   - Consider consulting OT/PT to assist with strengthening/mobility based on AM PAC & JH-HLM score  - Consult OT/PT to assist with strengthening/mobility   - Keep Call bell within reach  - Keep bed low and locked with side rails adjusted as appropriate  - Keep care items and personal belongings within reach  - Initiate and maintain comfort rounds  - Make Fall Risk Sign visible to staff  - Apply yellow socks and bracelet for high fall risk patients  - Consider moving patient to room near nurses station  Outcome: Completed     Problem: PAIN - ADULT  Goal: Verbalizes/displays adequate comfort level or baseline comfort level  Description: Interventions:  - Encourage patient to monitor pain and request assistance  - Assess pain using appropriate pain scale  - Administer analgesics as ordered based on type and severity of pain and evaluate response  - Implement non-pharmacological measures as appropriate and evaluate response  - Consider cultural and social influences on pain and pain management  - Notify physician/advanced practitioner if interventions unsuccessful or patient reports new pain  - Educate patient/family on pain management process including their role and importance of  reporting pain   - Provide non-pharmacologic/complimentary pain relief interventions  Outcome: Completed     Problem: INFECTION - ADULT  Goal: Absence or prevention of progression during hospitalization  Description: INTERVENTIONS:  - Assess and monitor for signs and symptoms of infection  - Monitor lab/diagnostic results  - Monitor all insertion sites, i.e. indwelling lines, tubes, and drains  - Monitor endotracheal if appropriate and nasal secretions for changes in amount and color  - [place] appropriate  cooling/warming therapies per order  - Administer medications as ordered  - Instruct and encourage patient and family to use good hand hygiene technique  - Identify and instruct in appropriate isolation precautions for identified infection/condition  Outcome: Completed  Goal: Absence of fever/infection during neutropenic period  Description: INTERVENTIONS:  - Monitor WBC  - Perform strict hand hygiene  - Limit to healthy visitors only  - No plants, dried, fresh or silk flowers with moore in patient room  Outcome: Completed     Problem: SAFETY ADULT  Goal: Patient will remain free of falls  Description: INTERVENTIONS:  - Educate patient/family on patient safety including physical limitations  - Instruct patient to call for assistance with activity   - Consider consulting OT/PT to assist with strengthening/mobility based on AM PAC & JH-HLM score  - Consult OT/PT to assist with strengthening/mobility   - Keep Call bell within reach  - Keep bed low and locked with side rails adjusted as appropriate  - Keep care items and personal belongings within reach  - Initiate and maintain comfort rounds  - Make Fall Risk Sign visible to staff  - Apply yellow socks and bracelet for high fall risk patients  - Consider moving patient to room near nurses station  Outcome: Completed  Goal: Maintain or return to baseline ADL function  Description: INTERVENTIONS:  -  Assess patient's ability to carry out ADLs; assess patient's baseline for ADL function and identify physical deficits which impact ability to perform ADLs (bathing, care of mouth/teeth, toileting, grooming, dressing, etc.)  - Assess/evaluate cause of self-care deficits   - Assess range of motion  - Assess patient's mobility; develop plan if impaired  - Assess patient's need for assistive devices and provide as appropriate  - Encourage maximum independence but intervene and supervise when necessary  - Involve family in performance of ADLs  - Assess for home care needs  following discharge   - Consider OT consult to assist with ADL evaluation and planning for discharge  - Provide patient education as appropriate  - Monitor functional capacity and physical performance, use of AM PAC & JH-HLM   - Monitor gait, balance and fatigue with ambulation    Outcome: Completed  Goal: Maintains/Returns to pre admission functional level  Description: INTERVENTIONS:  - Perform AM-PAC 6 Click Basic Mobility/ Daily Activity assessment daily.  - Set and communicate daily mobility goal to care team and patient/family/caregiver.   - Collaborate with rehabilitation services on mobility goals if consulted  - Out of bed for toileting  - Record patient progress and toleration of activity level   Outcome: Completed     Problem: DISCHARGE PLANNING  Goal: Discharge to home or other facility with appropriate resources  Description: INTERVENTIONS:  - Identify barriers to discharge w/patient and caregiver  - Arrange for needed discharge resources and transportation as appropriate  - Identify discharge learning needs (meds, wound care, etc.)  - Arrange for interpretive services to assist at discharge as needed  - Refer to Case Management Department for coordinating discharge planning if the patient needs post-hospital services based on physician/advanced practitioner order or complex needs related to functional status, cognitive ability, or social support system  Outcome: Completed     Problem: Knowledge Deficit  Goal: Patient/family/caregiver demonstrates understanding of disease process, treatment plan, medications, and discharge instructions  Description: Complete learning assessment and assess knowledge base.  Interventions:  - Provide teaching at level of understanding  - Provide teaching via preferred learning methods  Outcome: Completed

## 2025-07-24 NOTE — UTILIZATION REVIEW
NOTIFICATION OF INPATIENT ADMISSION   AUTHORIZATION REQUEST   SERVICING FACILITY:   Formerly [facility]  Address: [address] [place] [address]  Tax ID: [id number]  NPI: [id number] ATTENDING PROVIDER:  Attending Name and NPI#: Harsha Burnett Md [[id number]]  Address: [address] [place] [address]  Phone: [phone]   ADMISSION INFORMATION:  Place of Service: Inpatient [facility]  Place of Service Code: 21  Inpatient Admission Date/Time: 7/23/25  9:32 AM  Discharge Date/Time: No discharge date for patient encounter.  Admitting Diagnosis Code/Description:  Paroxysmal atrial fibrillation (HCC) [I48.0]     UTILIZATION REVIEW CONTACT:  [facility] Utilization   Network Utilization Review Department  Phone: [phone]  Fax: [phone]  Email: [email or web address]  Contact for approvals/pending authorizations, clinical reviews, and discharge.     PHYSICIAN ADVISORY SERVICES:  Medical Necessity Denial & Yzpc-bo-Qzsg Review  Phone: [phone]  Fax: [phone]  Email: [email or web address]     DISCHARGE SUPPORT TEAM:  For Patients Discharge Needs & Updates  Phone: [phone] opt. [phone] Fax: [phone]  Email: [email or web address]

## 2025-07-25 ENCOUNTER — PATIENT OUTREACH (OUTPATIENT)
Dept: CASE MANAGEMENT | Facility: OTHER | Age: 80
End: 2025-07-25

## 2025-07-25 LAB
ATRIAL RATE: 60 BPM
PR INTERVAL: 120 MS
QRS AXIS: 56 DEGREES
QRSD INTERVAL: 84 MS
QT INTERVAL: 526 MS
QTC INTERVAL: 526 MS
T WAVE AXIS: 75 DEGREES
VENTRICULAR RATE: 60 BPM

## 2025-07-25 PROCEDURE — 93010 ELECTROCARDIOGRAM REPORT: CPT | Performed by: INTERNAL MEDICINE

## 2025-07-25 NOTE — UTILIZATION REVIEW
NOTIFICATION OF ADMISSION DISCHARGE   This is a Notification of Discharge from [facility]. Please be advised that this patient has been discharge from our facility. Below you will find the admission and discharge date and time including the patient’s disposition.   UTILIZATION REVIEW CONTACT:  Utilization Review Assistants  Network Utilization Review Department  Phone: [phone] x carefully listen to the prompts. All voicemails are confidential.  Email: [email or web address]     ADMISSION INFORMATION  PRESENTATION DATE: 7/23/2025  7:57 AM  OBERVATION ADMISSION DATE: N/A  INPATIENT ADMISSION DATE: 7/23/25  9:32 AM   DISCHARGE DATE: 7/24/2025 12:20 PM   DISPOSITION:Home/Self Care    Network Utilization Review Department  ATTENTION: Please call with any questions or concerns to [phone] and carefully listen to the prompts so that you are directed to the right person. All voicemails are confidential.   For Discharge needs, contact Care Management DC Support Team at [phone] opt. 2  Send all requests for admission clinical reviews, approved or denied determinations and any other requests to dedicated fax number below belonging to the campus where the patient is receiving treatment. List of dedicated fax numbers for the Facilities:  FACILITY NAME UR FAX NUMBER   ADMISSION DENIALS (Administrative/Medical Necessity) [phone]   DISCHARGE SUPPORT TEAM ([facility]) [phone]   PARENT CHILD HEALTH (Maternity/NICU/Pediatrics) [phone]   [facility] [phone]   [facility] [phone]   [facility] [phone]   [facility] [phone]   [facility] [phone]   [facility] [phone]   [facility] [phone]   [facility] [phone]   [facility]  [facility] [phone]   [facility] [phone]   [facility] [phone]   [facility] ([place], [place]) [phone]

## 2025-07-25 NOTE — PROGRESS NOTES
Contacted patient for f/u post hospitalization for paroxysmal atrial fibrillation.  She had watchman procedure done.  Message left on  with contact information.

## 2025-07-28 ENCOUNTER — OFFICE VISIT (OUTPATIENT)
Dept: FAMILY MEDICINE CLINIC | Facility: CLINIC | Age: 80
End: 2025-07-28
Payer: COMMERCIAL

## 2025-07-28 VITALS
HEART RATE: 72 BPM | SYSTOLIC BLOOD PRESSURE: 144 MMHG | BODY MASS INDEX: 21.85 KG/M2 | DIASTOLIC BLOOD PRESSURE: 70 MMHG | WEIGHT: 128 LBS | TEMPERATURE: 98.1 F | HEIGHT: 64 IN | OXYGEN SATURATION: 99 %

## 2025-07-28 DIAGNOSIS — M81.0 AGE-RELATED OSTEOPOROSIS WITHOUT CURRENT PATHOLOGICAL FRACTURE: ICD-10-CM

## 2025-07-28 DIAGNOSIS — I10 PRIMARY HYPERTENSION: ICD-10-CM

## 2025-07-28 DIAGNOSIS — I48.0 PAROXYSMAL ATRIAL FIBRILLATION (HCC): ICD-10-CM

## 2025-07-28 DIAGNOSIS — N18.30 STAGE 3 CHRONIC KIDNEY DISEASE, UNSPECIFIED WHETHER STAGE 3A OR 3B CKD (HCC): ICD-10-CM

## 2025-07-28 DIAGNOSIS — Z95.818 PRESENCE OF WATCHMAN LEFT ATRIAL APPENDAGE CLOSURE DEVICE: Primary | ICD-10-CM

## 2025-07-28 DIAGNOSIS — E78.00 PURE HYPERCHOLESTEROLEMIA: ICD-10-CM

## 2025-07-28 PROCEDURE — 99495 TRANSJ CARE MGMT MOD F2F 14D: CPT | Performed by: FAMILY MEDICINE

## 2025-07-28 RX ORDER — METOPROLOL SUCCINATE 50 MG/1
50 TABLET, EXTENDED RELEASE ORAL DAILY
Qty: 30 TABLET | Refills: 5 | Status: SHIPPED | OUTPATIENT
Start: 2025-07-28

## 2025-07-29 NOTE — ANESTHESIA POSTPROCEDURE EVALUATION
Post-Op Assessment Note    CV Status:  Stable  Pain Score: 0    Pain management: adequate    Multimodal analgesia used between 6 hours prior to anesthesia start to PACU discharge    Mental Status:  Alert   Hydration Status:  Stable   PONV Controlled:  Controlled   Airway Patency:  Patent  Two or more mitigation strategies used for obstructive sleep apnea   Post Op Vitals Reviewed: Yes    No anethesia notable event occurred.    Staff: Anesthesiologist           Last Filed PACU Vitals:  Vitals Value Taken Time   Temp 97.5 °F (36.4 °C) 07/23/25 14:30   Pulse 60 07/23/25 15:13   /93 07/23/25 15:01   Resp 17 07/23/25 15:13   SpO2 98 % 07/23/25 15:13   Vitals shown include unfiled device data.    Modified Lisy:     Vitals Value Taken Time   Activity 2 07/23/25 15:00   Respiration 2 07/23/25 15:00   Circulation 2 07/23/25 15:00   Consciousness 2 07/23/25 15:00   Oxygen Saturation 2 07/23/25 15:00     Modified Lisy Score: 10

## 2025-08-01 ENCOUNTER — PATIENT OUTREACH (OUTPATIENT)
Dept: CASE MANAGEMENT | Facility: OTHER | Age: 80
End: 2025-08-01

## (undated) DEVICE — CAPIT WATCHMAN FLX LAA PROCEDURE DEVICE

## (undated) DEVICE — PINNACLE INTRODUCER SHEATH: Brand: PINNACLE

## (undated) DEVICE — DGW .035 FC J3MM 150CM TEF: Brand: EMERALD

## (undated) DEVICE — CATH ULTRASOUND ACUNAV ICE 8FR 90CM GE VIVID-I

## (undated) DEVICE — CATH ABLATION FARAWAVE PULSED FIELD 31MM

## (undated) DEVICE — Device

## (undated) DEVICE — CATH DIAG 5FR .035 100CM PIG CSC 20

## (undated) DEVICE — CABLE CATH CONNECTION FARASTAR

## (undated) DEVICE — ACCESS SHEATH WITH DILATOR: Brand: WATCHMAN TRUSTEER™ ACCESS SYSTEM

## (undated) DEVICE — 1 X VERSACROSS CONNECT TRANSSEPTAL DILATOR;  1 X VERSACROSS RF WIRE (INCLUDING 1 X CONNECTOR CABLE (SINGLE USE)): Brand: VERSACROSS CONNECT ACCESS SOLUTION FOR FARADRIVE

## (undated) DEVICE — ROSEN CURVED WIRE GUIDE: Brand: ROSEN

## (undated) DEVICE — SHEATH STEERABLE FARADRIVE